# Patient Record
Sex: FEMALE | Race: WHITE | NOT HISPANIC OR LATINO | Employment: OTHER | URBAN - METROPOLITAN AREA
[De-identification: names, ages, dates, MRNs, and addresses within clinical notes are randomized per-mention and may not be internally consistent; named-entity substitution may affect disease eponyms.]

---

## 2023-03-22 DIAGNOSIS — M19.90 ARTHRITIS: Primary | ICD-10-CM

## 2023-03-24 RX ORDER — MELOXICAM 15 MG/1
TABLET ORAL
Qty: 90 TABLET | Refills: 3 | Status: SHIPPED | OUTPATIENT
Start: 2023-03-24 | End: 2024-01-22

## 2023-04-11 ENCOUNTER — APPOINTMENT (OUTPATIENT)
Dept: PRIMARY CARE | Facility: CLINIC | Age: 83
End: 2023-04-11
Payer: MEDICARE

## 2023-04-17 ENCOUNTER — LAB (OUTPATIENT)
Dept: LAB | Facility: LAB | Age: 83
End: 2023-04-17
Payer: MEDICARE

## 2023-04-17 ENCOUNTER — OFFICE VISIT (OUTPATIENT)
Dept: PRIMARY CARE | Facility: CLINIC | Age: 83
End: 2023-04-17
Payer: MEDICARE

## 2023-04-17 VITALS — DIASTOLIC BLOOD PRESSURE: 82 MMHG | SYSTOLIC BLOOD PRESSURE: 135 MMHG

## 2023-04-17 DIAGNOSIS — R05.9 COUGH, UNSPECIFIED TYPE: ICD-10-CM

## 2023-04-17 DIAGNOSIS — R05.3 CHRONIC COUGH: Primary | ICD-10-CM

## 2023-04-17 DIAGNOSIS — F10.27 DEMENTIA ASSOCIATED WITH ALCOHOLISM, UNSPECIFIED DEMENTIA SEVERITY, UNSPECIFIED WHETHER BEHAVIORAL, PSYCHOTIC, OR MOOD DISTURBANCE OR ANXIETY (MULTI): ICD-10-CM

## 2023-04-17 DIAGNOSIS — N32.81 OVERACTIVE BLADDER: ICD-10-CM

## 2023-04-17 LAB
ALANINE AMINOTRANSFERASE (SGPT) (U/L) IN SER/PLAS: 36 U/L (ref 7–45)
ALBUMIN (G/DL) IN SER/PLAS: 4.5 G/DL (ref 3.4–5)
ALKALINE PHOSPHATASE (U/L) IN SER/PLAS: 115 U/L (ref 33–136)
ANION GAP IN SER/PLAS: 13 MMOL/L (ref 10–20)
ASPARTATE AMINOTRANSFERASE (SGOT) (U/L) IN SER/PLAS: 26 U/L (ref 9–39)
BILIRUBIN TOTAL (MG/DL) IN SER/PLAS: 0.4 MG/DL (ref 0–1.2)
CALCIUM (MG/DL) IN SER/PLAS: 10.6 MG/DL (ref 8.6–10.6)
CARBON DIOXIDE, TOTAL (MMOL/L) IN SER/PLAS: 27 MMOL/L (ref 21–32)
CHLORIDE (MMOL/L) IN SER/PLAS: 110 MMOL/L (ref 98–107)
COBALAMIN (VITAMIN B12) (PG/ML) IN SER/PLAS: 523 PG/ML (ref 211–911)
CREATININE (MG/DL) IN SER/PLAS: 1.03 MG/DL (ref 0.5–1.05)
FOLATE (NG/ML) IN SER/PLAS: >24 NG/ML
GFR FEMALE: 54 ML/MIN/1.73M2
GLUCOSE (MG/DL) IN SER/PLAS: 99 MG/DL (ref 74–99)
POC CALCIUM IONIZED (MMOL/L) IN BLOOD: 1.37 MMOL/L (ref 1.1–1.33)
POTASSIUM (MMOL/L) IN SER/PLAS: 4.4 MMOL/L (ref 3.5–5.3)
PROTEIN TOTAL: 7 G/DL (ref 6.4–8.2)
SODIUM (MMOL/L) IN SER/PLAS: 146 MMOL/L (ref 136–145)
UREA NITROGEN (MG/DL) IN SER/PLAS: 31 MG/DL (ref 6–23)

## 2023-04-17 PROCEDURE — 99214 OFFICE O/P EST MOD 30 MIN: CPT | Performed by: INTERNAL MEDICINE

## 2023-04-17 PROCEDURE — 82397 CHEMILUMINESCENT ASSAY: CPT

## 2023-04-17 PROCEDURE — 82330 ASSAY OF CALCIUM: CPT

## 2023-04-17 PROCEDURE — 82607 VITAMIN B-12: CPT

## 2023-04-17 PROCEDURE — 83970 ASSAY OF PARATHORMONE: CPT

## 2023-04-17 PROCEDURE — 80053 COMPREHEN METABOLIC PANEL: CPT

## 2023-04-17 PROCEDURE — 82746 ASSAY OF FOLIC ACID SERUM: CPT

## 2023-04-17 PROCEDURE — 36415 COLL VENOUS BLD VENIPUNCTURE: CPT

## 2023-04-17 RX ORDER — MEMANTINE HYDROCHLORIDE 10 MG/1
10 TABLET ORAL 2 TIMES DAILY
COMMUNITY

## 2023-04-17 RX ORDER — MELOXICAM 15 MG/1
15 TABLET ORAL DAILY
COMMUNITY
Start: 2015-10-28 | End: 2023-04-17

## 2023-04-17 RX ORDER — MIRABEGRON 50 MG/1
50 TABLET, EXTENDED RELEASE ORAL DAILY
Qty: 90 TABLET | Refills: 3 | Status: SHIPPED | OUTPATIENT
Start: 2023-04-17 | End: 2024-04-09 | Stop reason: SDUPTHER

## 2023-04-17 RX ORDER — DONEPEZIL HYDROCHLORIDE 10 MG/1
10 TABLET, FILM COATED ORAL NIGHTLY
COMMUNITY
Start: 2019-06-27

## 2023-04-17 RX ORDER — MIRABEGRON 50 MG/1
25 TABLET, FILM COATED, EXTENDED RELEASE ORAL DAILY
COMMUNITY
End: 2023-04-17 | Stop reason: SDUPTHER

## 2023-04-17 RX ORDER — ACETAMINOPHEN AND CODEINE PHOSPHATE 300; 30 MG/1; MG/1
1 TABLET ORAL NIGHTLY
Qty: 90 TABLET | Refills: 0 | Status: SHIPPED | OUTPATIENT
Start: 2023-04-17 | End: 2023-09-25 | Stop reason: SDUPTHER

## 2023-04-17 RX ORDER — NIFEDIPINE 30 MG/1
30 TABLET, FILM COATED, EXTENDED RELEASE ORAL
COMMUNITY
Start: 2019-11-18 | End: 2023-04-17

## 2023-04-17 RX ORDER — ACETAMINOPHEN AND CODEINE PHOSPHATE 300; 30 MG/1; MG/1
1 TABLET ORAL NIGHTLY
COMMUNITY
Start: 2023-03-29 | End: 2023-04-17 | Stop reason: SDUPTHER

## 2023-04-17 RX ORDER — BENZONATATE 100 MG/1
100 CAPSULE ORAL 3 TIMES DAILY PRN
COMMUNITY

## 2023-04-17 NOTE — PATIENT INSTRUCTIONS
Marli,     We are repeating labs today.   You're due for a Tylenol #3 refill on 4-29-23 which I will send to the pharmacy.   See me back in 3 months!     CL

## 2023-04-17 NOTE — PROGRESS NOTES
Marli Pierce is an 82 yo F Presenting in FU.     *HYperCa   [ ]repeat labs incl PTH     1. MNG   -s/p bx 2015   -s/p endo surg referral in interval, planned repeat US in yrs, no need further bx     2. Chronic cough   -tried brittanie, failed   -tried stopping losart - did not affect cough and became hypertensive   -chronic codeine in the past with me   -s/p extensive workup dx'ed LPR and VCD in the past   -apprised of risks,benefits   -UDS 11.22, positive for morphine and codeine, metabolite of codeine  -Follow up in 3 months, try Tylenol 3 see if there are any benefits vs discontinue  [ ]update CSA     3. BCA s/p surgery and reconstruction     4. Thoracic spine DJD per MRI 2015     5. CKD   [ ]repeat labs     6. HTN, chronic orthostasis   -nifedipine 30 BID   -losartan 50 (pt stopped taking 2-3 months ago)  -Take pills with her next visit to go over med list    7. DLD , minimal carotid plaque only on 9/2019 carotid US   -atorva 20     8. MCI   -follows with Dr. Neema Suresh a private physician   -donepazil and memantine chronically through them   -MRI Brain 8/2020: no mass, infarct or hemorrhage, small vessel ischemic change   -Pt reports worsening cognitive decline, follows with neurology    9. KEYONA   -Myrbetriq             I have personally reviewed the OARRS report for MARLI PIERCE. I have considered the risks of abuse, dependence, addiction and diversion.   Is the patient prescribed a combination of a benzodiazepine and opioid? No.   Last urine drug screening date/ordered today: 10-10-22   Results of last screen: Results as expected.   Controlled Substance Agreement:   I have printed this form and reviewed each line item with the patient and the patient has verbalized understanding.   Date of the last Controlled Substance Agreement: 4.17.23  OPIOID   What is the patient’s goal of therapy? chronic cough control.  Is this being achieved with current treatment? yes.   Attestation statement: I feel that  it is clinically indicated to continue this current medication regimen after consideration of alternative therapies, and other non-opioid treatments.   Opioid Risk Screening:   Opioid Risk Tool   Last opioid risk screening date/ordered today: 4.17.23  Patient's total score is 0, within range of Low Risk (<= 3).   Pain Scale Screening:   Pain Assessment and Documentation Tool (PADT)   Date of Assessment: 7-26-21  Analgesia:   Patient reports her pain level on average during the past week is 4 on a 0 - 10 scale.   Patient reports that her pain level at its worst during the past week was 2 on a 0 -10 scale.   90 % of pain has been relieved during the past week per patient   Patient states that the amount of pain relief she is now obtaining from her current pain reliever(s) is enough to make a real difference in her life.  Activities of Daily Living:   Physical functioning: Better   Family relationships: Better   Social relationships: Better   Mood: Better   Sleep patterns: Better   Overall functioning: Better   Adverse Events:   No, SERGIO PIERCE is not experiencing side effects from current pain reliever.  a. Nausea: None  b. Vomiting: None  c. Constipation: None  d. Itching: None  e. Mental cloudiness: None  f. Sweating: None  g. Fatigue: None  h. Drowsiness: None.   Comments:. cough used as surrogate for pain in this case of off label use of low dose codeine.              Gen Aox3 NAD well appearing   HEENT mmm pharynx clear no cervical LAD   Eyes sclerae clear   CV rrr nl s1/2 no m/r/g  Pulm CTAB no adventitious sounds   Ext warm dry no edema 2+ DP pulse

## 2023-04-18 LAB — PARATHYRIN INTACT (PG/ML) IN SER/PLAS: 150.8 PG/ML (ref 18.5–88)

## 2023-04-21 LAB — PTH-RELATED PEPTIDE, PLASMA: 0.6 PMOL/L

## 2023-06-14 DIAGNOSIS — I10 PRIMARY HYPERTENSION: ICD-10-CM

## 2023-06-15 DIAGNOSIS — I10 PRIMARY HYPERTENSION: ICD-10-CM

## 2023-06-15 RX ORDER — NIFEDIPINE 30 MG/1
TABLET, FILM COATED, EXTENDED RELEASE ORAL
Qty: 180 TABLET | Refills: 3 | Status: SHIPPED | OUTPATIENT
Start: 2023-06-15 | End: 2023-06-16

## 2023-06-16 RX ORDER — NIFEDIPINE 30 MG/1
TABLET, FILM COATED, EXTENDED RELEASE ORAL
Qty: 180 TABLET | Refills: 3 | Status: SHIPPED | OUTPATIENT
Start: 2023-06-16 | End: 2024-02-23

## 2023-07-17 ENCOUNTER — OFFICE VISIT (OUTPATIENT)
Dept: PRIMARY CARE | Facility: CLINIC | Age: 83
End: 2023-07-17
Payer: MEDICARE

## 2023-07-17 ENCOUNTER — APPOINTMENT (OUTPATIENT)
Dept: LAB | Facility: LAB | Age: 83
End: 2023-07-17
Payer: MEDICARE

## 2023-07-17 ENCOUNTER — LAB (OUTPATIENT)
Dept: LAB | Facility: LAB | Age: 83
End: 2023-07-17
Payer: MEDICARE

## 2023-07-17 VITALS
BODY MASS INDEX: 30.18 KG/M2 | HEART RATE: 90 BPM | SYSTOLIC BLOOD PRESSURE: 138 MMHG | WEIGHT: 165 LBS | DIASTOLIC BLOOD PRESSURE: 85 MMHG

## 2023-07-17 DIAGNOSIS — E21.3 HYPERPARATHYROIDISM (MULTI): ICD-10-CM

## 2023-07-17 DIAGNOSIS — Z12.31 BREAST CANCER SCREENING BY MAMMOGRAM: ICD-10-CM

## 2023-07-17 DIAGNOSIS — Z78.0 ASYMPTOMATIC POSTMENOPAUSAL ESTROGEN DEFICIENCY: ICD-10-CM

## 2023-07-17 DIAGNOSIS — E83.52 HYPERCALCEMIA: Primary | ICD-10-CM

## 2023-07-17 DIAGNOSIS — E83.52 HYPERCALCEMIA: ICD-10-CM

## 2023-07-17 LAB
ALANINE AMINOTRANSFERASE (SGPT) (U/L) IN SER/PLAS: 40 U/L (ref 7–45)
ALBUMIN (G/DL) IN SER/PLAS: 5 G/DL (ref 3.4–5)
ALKALINE PHOSPHATASE (U/L) IN SER/PLAS: 142 U/L (ref 33–136)
ANION GAP IN SER/PLAS: 19 MMOL/L (ref 10–20)
ASPARTATE AMINOTRANSFERASE (SGOT) (U/L) IN SER/PLAS: 28 U/L (ref 9–39)
BASOPHILS (10*3/UL) IN BLOOD BY AUTOMATED COUNT: 0.03 X10E9/L (ref 0–0.1)
BASOPHILS/100 LEUKOCYTES IN BLOOD BY AUTOMATED COUNT: 0.4 % (ref 0–2)
BILIRUBIN TOTAL (MG/DL) IN SER/PLAS: 0.5 MG/DL (ref 0–1.2)
CALCIUM (MG/DL) IN SER/PLAS: 11.4 MG/DL (ref 8.6–10.6)
CARBON DIOXIDE, TOTAL (MMOL/L) IN SER/PLAS: 24 MMOL/L (ref 21–32)
CHLORIDE (MMOL/L) IN SER/PLAS: 107 MMOL/L (ref 98–107)
CREATININE (MG/DL) IN SER/PLAS: 1.01 MG/DL (ref 0.5–1.05)
EOSINOPHILS (10*3/UL) IN BLOOD BY AUTOMATED COUNT: 0.05 X10E9/L (ref 0–0.4)
EOSINOPHILS/100 LEUKOCYTES IN BLOOD BY AUTOMATED COUNT: 0.7 % (ref 0–6)
ERYTHROCYTE DISTRIBUTION WIDTH (RATIO) BY AUTOMATED COUNT: 14.7 % (ref 11.5–14.5)
ERYTHROCYTE MEAN CORPUSCULAR HEMOGLOBIN CONCENTRATION (G/DL) BY AUTOMATED: 31.6 G/DL (ref 32–36)
ERYTHROCYTE MEAN CORPUSCULAR VOLUME (FL) BY AUTOMATED COUNT: 98 FL (ref 80–100)
ERYTHROCYTES (10*6/UL) IN BLOOD BY AUTOMATED COUNT: 5.21 X10E12/L (ref 4–5.2)
GFR FEMALE: 55 ML/MIN/1.73M2
GLUCOSE (MG/DL) IN SER/PLAS: 98 MG/DL (ref 74–99)
HEMATOCRIT (%) IN BLOOD BY AUTOMATED COUNT: 51.2 % (ref 36–46)
HEMOGLOBIN (G/DL) IN BLOOD: 16.2 G/DL (ref 12–16)
IMMATURE GRANULOCYTES/100 LEUKOCYTES IN BLOOD BY AUTOMATED COUNT: 0.3 % (ref 0–0.9)
LEUKOCYTES (10*3/UL) IN BLOOD BY AUTOMATED COUNT: 7.2 X10E9/L (ref 4.4–11.3)
LYMPHOCYTES (10*3/UL) IN BLOOD BY AUTOMATED COUNT: 1.44 X10E9/L (ref 0.8–3)
LYMPHOCYTES/100 LEUKOCYTES IN BLOOD BY AUTOMATED COUNT: 19.9 % (ref 13–44)
MONOCYTES (10*3/UL) IN BLOOD BY AUTOMATED COUNT: 0.5 X10E9/L (ref 0.05–0.8)
MONOCYTES/100 LEUKOCYTES IN BLOOD BY AUTOMATED COUNT: 6.9 % (ref 2–10)
NEUTROPHILS (10*3/UL) IN BLOOD BY AUTOMATED COUNT: 5.19 X10E9/L (ref 1.6–5.5)
NEUTROPHILS/100 LEUKOCYTES IN BLOOD BY AUTOMATED COUNT: 71.8 % (ref 40–80)
NRBC (PER 100 WBCS) BY AUTOMATED COUNT: 0 /100 WBC (ref 0–0)
PARATHYRIN INTACT (PG/ML) IN SER/PLAS: 149.6 PG/ML (ref 18.5–88)
PLATELETS (10*3/UL) IN BLOOD AUTOMATED COUNT: 290 X10E9/L (ref 150–450)
POTASSIUM (MMOL/L) IN SER/PLAS: 4.8 MMOL/L (ref 3.5–5.3)
PROTEIN TOTAL: 8.3 G/DL (ref 6.4–8.2)
SODIUM (MMOL/L) IN SER/PLAS: 145 MMOL/L (ref 136–145)
THYROTROPIN (MIU/L) IN SER/PLAS BY DETECTION LIMIT <= 0.05 MIU/L: 2.74 MIU/L (ref 0.44–3.98)
UREA NITROGEN (MG/DL) IN SER/PLAS: 33 MG/DL (ref 6–23)

## 2023-07-17 PROCEDURE — 80053 COMPREHEN METABOLIC PANEL: CPT

## 2023-07-17 PROCEDURE — 99214 OFFICE O/P EST MOD 30 MIN: CPT | Performed by: INTERNAL MEDICINE

## 2023-07-17 PROCEDURE — 85025 COMPLETE CBC W/AUTO DIFF WBC: CPT

## 2023-07-17 PROCEDURE — 84443 ASSAY THYROID STIM HORMONE: CPT

## 2023-07-17 PROCEDURE — 1125F AMNT PAIN NOTED PAIN PRSNT: CPT | Performed by: INTERNAL MEDICINE

## 2023-07-17 PROCEDURE — 36415 COLL VENOUS BLD VENIPUNCTURE: CPT

## 2023-07-17 PROCEDURE — 83970 ASSAY OF PARATHORMONE: CPT

## 2023-07-17 RX ORDER — CALCIUM CARB, CITRATE, MALATE 250 MG
2 CAPSULE ORAL 2 TIMES DAILY
COMMUNITY

## 2023-07-17 RX ORDER — IBUPROFEN 100 MG/5ML
1000 SUSPENSION, ORAL (FINAL DOSE FORM) ORAL DAILY
COMMUNITY

## 2023-07-17 NOTE — PROGRESS NOTES
Subjective   Patient ID: Marli Canales is a 83 y.o. female who presents for FUV.     HPI  Ms. Canales is an 82 yo F with PMH of cognitive impairment/dementia, HTN, DLD, MNG, breast cancer s/p chemoRT and reconstruction surgery who presents for FUV.     Last seen in April when patient noted to have hypercalcemia noted on lab work. Repeat labs included PTH which was high. No renal dx. Patient accompanied by her , who is her primary caregiver, who provided majority of history.     Patient reportedly had worsening in memory and mental status over the past year. Independent in toileting, dressing herself, but becoming more dependent in other ADLs. Endorsed worsening lethargy, unable to do as much as before. Mild weight gain 5 lbs in last few months due to lack of activity. Patient also endorsed mild arthritis/bone pains.     Otherwise denied abd pain or discomfort, no kidney stones, no hematuria. No other specific complaints.     Review of Systems  12-point ROS completed and negative other than noted in HPI above.     Objective   Physical Exam  Gen: well appearing, sitting in chair in NAD  HEENT: pupils equal and round, MMM, EOMI intact  Resp: CTAB no crackles/wheeze, comfortable on RA  CV: RRR, no murmurs, 2+ bl radial pulses, normal S1 and S2  GI: s/nt/nd  Ext: wwp, normal ROM without pain  Neuro: alert, interacts/responds appropriately, follows commands  Psych: appropriate mood and pleasant affect    Assessment/Plan     Ms. Canales is an 82 yo F with PMH of cognitive impairment/dementia, HTN, DLD, MNG, breast cancer s/p chemoRT and reconstruction surgery who presents for FUV.     #Hypercalcemia  #Elevated PTH  :: Labs c/w primary hyperpara vs [facility]. Patient's CKD is only mild  :: Possibly symptomatic given worsening memory issues, bone pain, lethargy  - Endocrine surg referral  - Urine Ca measurement  - Vit D testing wnl in past    #MNG   -s/p bx 2015   -s/p endo surg referral in interval, planned  repeat US in yrs, no need further bx     # Chronic cough   -tried brittanie, failed   -tried stopping losart - did not affect cough and became hypertensive   -s/p extensive workup dx'ed LPR and VCD in the past   - chronic codeine use has been the only thing that has helped  -apprised of risks,benefits   -UDS 11.22, positive for morphine and codeine, metabolite of codeine, updated CSA     #BCA s/p surgery and reconstruction      #Thoracic spine DJD per MRI 2015      #CKD   - continue to trend for now     #HTN  # Hx chronic orthostasis   - Goal SBP 130s given hx of hypotension and orthostasis  -nifedipine 30 BID, at goal     # DLD , minimal carotid plaque only on 9/2019 carotid US   -atorva 20      #Cognitive Impairment  #Lethargy  -follows with Dr. Neema Suresh a private physician   -donepazil and memantine chronically through them   -MRI Brain 8/2020: no mass, infarct or hemorrhage, small vessel ischemic change   -Pt reports worsening cognitive decline, will rule out reversible etiology from hyperCa+  - Repeat labs including TSH    # OAB   -Continue myrbetriq

## 2023-07-17 NOTE — PATIENT INSTRUCTIONS
Marli,     We are repeating full labs today.   I am also ordering a 24 hour urine test. You go to the bathroom in the morning first thing and DONT collect that (since that is overnight urine), after that you collect all the urine through the day and the first morning urine the next morning. Then you return it to lab.   I am placing referrals both back to Dr. Kenn Gutierrez and to an endocrinologist to explore the potential that some of your memory changes may be related to a problem called hyperparathyroidism.   Call [phone] to schedule a mammogram and a bone density.     CL

## 2023-08-28 ENCOUNTER — TELEPHONE (OUTPATIENT)
Dept: PRIMARY CARE | Facility: CLINIC | Age: 83
End: 2023-08-28
Payer: MEDICARE

## 2023-08-28 NOTE — TELEPHONE ENCOUNTER
Patient went to a football game and became lethargic  she got sent to ER after, her  want to know do you want to see her , she has appointment in October, but do yo want to see her before, he would like yo to review notes from ER

## 2023-08-29 ENCOUNTER — APPOINTMENT (OUTPATIENT)
Dept: PRIMARY CARE | Facility: CLINIC | Age: 83
End: 2023-08-29
Payer: MEDICARE

## 2023-08-30 ENCOUNTER — OFFICE VISIT (OUTPATIENT)
Dept: PRIMARY CARE | Facility: CLINIC | Age: 83
End: 2023-08-30
Payer: MEDICARE

## 2023-08-30 DIAGNOSIS — N17.9 ACUTE KIDNEY INJURY (CMS-HCC): Primary | ICD-10-CM

## 2023-08-30 PROCEDURE — 99214 OFFICE O/P EST MOD 30 MIN: CPT | Performed by: INTERNAL MEDICINE

## 2023-08-30 PROCEDURE — 1125F AMNT PAIN NOTED PAIN PRSNT: CPT | Performed by: INTERNAL MEDICINE

## 2023-08-30 NOTE — PROGRESS NOTES
Sergio Pierce is an 84 yo F presenting in ER FU.   Presented 8.18.23 after AMS at a hot football game.   Near syncopal event, taken to ER.   ER labs +dimer and minor trop leak +mild JAZMIN   CT PE neg.   Mild JAZMIN on labs.   Released to home.       Chronic PL     *HYperCa, likely hyperpara   -s/p endo referral 7.17.23 not yet had visit or scheduled   [ ]will replace endo referral      1. MNG   -s/p bx 2015   -s/p endo surg referral in interval, planned repeat US in yrs, no need further bx      2. Chronic cough   -tried brittanie, failed   -tried stopping losart - did not affect cough and became hypertensive   -chronic codeine in the past with me   -s/p extensive workup dx'ed LPR and VCD in the past   -apprised of risks,benefits   -UDS 11.22, positive for morphine and codeine, metabolite of codeine  -Follow up in 3 months, try Tylenol 3 see if there are any benefits vs discontinue  [ ]update CSA      3. BCA s/p surgery and reconstruction      4. Thoracic spine DJD per MRI 2015      5. CKD   [ ]repeat labs      6. HTN, chronic orthostasis   -nifedipine 30 BID   -losartan 50 (pt stopped taking 2-3 months ago)  -Take pills with her next visit to go over med list     7. DLD , minimal carotid plaque only on 9/2019 carotid US   -atorva 20      8. MCI   -follows with Dr. Neema Suresh a private physician   -donepazil and memantine chronically through them   -MRI Brain 8/2020: no mass, infarct or hemorrhage, small vessel ischemic change   -Pt reports worsening cognitive decline, follows with neurology     9. OAB   -Myrbetriq                  I have personally reviewed the OARRS report for SERGIO PIERCE. I have considered the risks of abuse, dependence, addiction and diversion.   Is the patient prescribed a combination of a benzodiazepine and opioid? No.   Last urine drug screening date/ordered today: 10-10-22   Results of last screen: Results as expected.   Controlled Substance Agreement:   I have printed this form  and reviewed each line item with the patient and the patient has verbalized understanding.   Date of the last Controlled Substance Agreement: 4.17.23  OPIOID   What is the patient’s goal of therapy? chronic cough control.  Is this being achieved with current treatment? yes.   Attestation statement: I feel that it is clinically indicated to continue this current medication regimen after consideration of alternative therapies, and other non-opioid treatments.   Opioid Risk Screening:   Opioid Risk Tool   Last opioid risk screening date/ordered today: 4.17.23  Patient's total score is 0, within range of Low Risk (<= 3).   Pain Scale Screening:   Pain Assessment and Documentation Tool (PADT)   Date of Assessment: 7-26-21  Analgesia:   Patient reports her pain level on average during the past week is 4 on a 0 - 10 scale.   Patient reports that her pain level at its worst during the past week was 2 on a 0 -10 scale.   90 % of pain has been relieved during the past week per patient   Patient states that the amount of pain relief she is now obtaining from her current pain reliever(s) is enough to make a real difference in her life.  Activities of Daily Living:   Physical functioning: Better   Family relationships: Better   Social relationships: Better   Mood: Better   Sleep patterns: Better   Overall functioning: Better   Adverse Events:   No, SERGIO PIERCE is not experiencing side effects from current pain reliever.  a. Nausea: None  b. Vomiting: None  c. Constipation: None  d. Itching: None  e. Mental cloudiness: None  f. Sweating: None  g. Fatigue: None  h. Drowsiness: None.   Comments:. cough used as surrogate for pain in this case of off label use of low dose codeine.            O   VSS   Gen alert well appearing NAD   HENT mmm   Ext warm dry no edema         A/P   Near syncopal event s/p OSH ER visit with unremarkable workup now back to baseline.   Recent hyperCa with elevated PTH in setting of background  dementia, enocuraged endocrinology visit.

## 2023-09-05 ENCOUNTER — LAB (OUTPATIENT)
Dept: LAB | Facility: LAB | Age: 83
End: 2023-09-05
Payer: MEDICARE

## 2023-09-05 DIAGNOSIS — E83.52 HYPERCALCEMIA: ICD-10-CM

## 2023-09-05 DIAGNOSIS — E21.3 HYPERPARATHYROIDISM (MULTI): ICD-10-CM

## 2023-09-05 LAB
CALCIUM (MG/DL) IN URINE: 11.6 MG/DL
CALCIUM (MG/L) IN 24 HOUR URINE: 104 MG/24H (ref 100–300)
COLLECTION DURATION OF URINE: 24 HR
CREATININE (MG/24HR) IN 24 HOUR URINE: 0.7 G/24H (ref 0.67–1.59)
CREATININE (MG/DL) IN URINE: 77.7 MG/DL (ref 20–320)
VOLUME OF URINE: 900 ML

## 2023-09-05 PROCEDURE — 82340 ASSAY OF CALCIUM IN URINE: CPT

## 2023-09-05 PROCEDURE — 82570 ASSAY OF URINE CREATININE: CPT

## 2023-09-05 PROCEDURE — 81050 URINALYSIS VOLUME MEASURE: CPT

## 2023-09-08 ENCOUNTER — TELEPHONE (OUTPATIENT)
Dept: PRIMARY CARE | Facility: CLINIC | Age: 83
End: 2023-09-08
Payer: MEDICARE

## 2023-09-08 NOTE — TELEPHONE ENCOUNTER
Nito said  on patients visit summary it say they discussed a acute kidney injury , he said they never discussed this, so he dont want the wrong info to be in her history, also  the 24hour urine did not test parathyroid he say that's the reason why a 24hour was done

## 2023-09-25 DIAGNOSIS — R05.9 COUGH, UNSPECIFIED TYPE: ICD-10-CM

## 2023-09-27 RX ORDER — ACETAMINOPHEN AND CODEINE PHOSPHATE 300; 30 MG/1; MG/1
1 TABLET ORAL NIGHTLY
Qty: 90 TABLET | Refills: 0 | Status: SHIPPED | OUTPATIENT
Start: 2023-09-27 | End: 2023-11-10 | Stop reason: SDUPTHER

## 2023-10-16 ENCOUNTER — APPOINTMENT (OUTPATIENT)
Dept: PRIMARY CARE | Facility: CLINIC | Age: 83
End: 2023-10-16
Payer: MEDICARE

## 2023-11-03 ENCOUNTER — OFFICE VISIT (OUTPATIENT)
Dept: ENDOCRINOLOGY | Facility: CLINIC | Age: 83
End: 2023-11-03
Payer: MEDICARE

## 2023-11-03 VITALS
SYSTOLIC BLOOD PRESSURE: 132 MMHG | WEIGHT: 163 LBS | DIASTOLIC BLOOD PRESSURE: 74 MMHG | HEIGHT: 62 IN | BODY MASS INDEX: 30 KG/M2

## 2023-11-03 DIAGNOSIS — E21.3 HYPERPARATHYROIDISM (MULTI): ICD-10-CM

## 2023-11-03 DIAGNOSIS — E04.1 THYROID NODULE: Primary | ICD-10-CM

## 2023-11-03 DIAGNOSIS — E83.52 HYPERCALCEMIA: ICD-10-CM

## 2023-11-03 DIAGNOSIS — I10 ESSENTIAL HYPERTENSION: ICD-10-CM

## 2023-11-03 PROBLEM — R60.9 EDEMA: Status: ACTIVE | Noted: 2023-11-03

## 2023-11-03 PROBLEM — R49.0 HOARSENESS: Status: ACTIVE | Noted: 2021-09-29

## 2023-11-03 PROBLEM — I15.9 SECONDARY HYPERTENSION: Status: ACTIVE | Noted: 2021-09-01

## 2023-11-03 PROBLEM — E07.9 THYROID MASS: Status: ACTIVE | Noted: 2023-11-03

## 2023-11-03 PROBLEM — R92.8 ABNORMAL MAMMOGRAM: Status: ACTIVE | Noted: 2023-11-03

## 2023-11-03 PROBLEM — R79.89 ELEVATED SERUM CREATININE: Status: ACTIVE | Noted: 2021-09-29

## 2023-11-03 PROBLEM — M70.61 TROCHANTERIC BURSITIS OF BOTH HIPS: Status: ACTIVE | Noted: 2023-11-03

## 2023-11-03 PROBLEM — R79.89 OTHER SPECIFIED ABNORMAL FINDINGS OF BLOOD CHEMISTRY: Status: ACTIVE | Noted: 2023-11-03

## 2023-11-03 PROBLEM — C50.919 MALIGNANT NEOPLASM OF BREAST (MULTI): Status: ACTIVE | Noted: 2023-11-03

## 2023-11-03 PROBLEM — R41.3 MEMORY LOSS: Status: ACTIVE | Noted: 2023-11-03

## 2023-11-03 PROBLEM — J30.2 SEASONAL ALLERGIES: Status: ACTIVE | Noted: 2023-11-03

## 2023-11-03 PROBLEM — I97.2 POSTMASTECTOMY LYMPHEDEMA SYNDROME OF RIGHT UPPER EXTREMITY: Status: ACTIVE | Noted: 2023-11-03

## 2023-11-03 PROBLEM — R42 DIZZINESS AND GIDDINESS: Status: ACTIVE | Noted: 2023-11-03

## 2023-11-03 PROBLEM — R79.89 POSITIVE D-DIMER: Status: ACTIVE | Noted: 2023-11-03

## 2023-11-03 PROBLEM — R26.9 ABNORMAL GAIT: Status: ACTIVE | Noted: 2023-11-03

## 2023-11-03 PROBLEM — R94.31 ABNORMAL ECG: Status: ACTIVE | Noted: 2021-09-29

## 2023-11-03 PROBLEM — R06.09 CHRONIC DYSPNEA: Status: ACTIVE | Noted: 2023-11-03

## 2023-11-03 PROBLEM — N60.11 BILATERAL FIBROCYSTIC BREAST CHANGES: Status: ACTIVE | Noted: 2021-12-23

## 2023-11-03 PROBLEM — F32.A MILD DEPRESSION: Status: ACTIVE | Noted: 2023-11-03

## 2023-11-03 PROBLEM — M19.042 ARTHRITIS OF HAND, LEFT, DEGENERATIVE: Status: ACTIVE | Noted: 2023-11-03

## 2023-11-03 PROBLEM — J30.9 ALLERGIC RHINITIS: Status: ACTIVE | Noted: 2023-11-03

## 2023-11-03 PROBLEM — K21.9 GERD WITHOUT ESOPHAGITIS: Status: ACTIVE | Noted: 2023-11-03

## 2023-11-03 PROBLEM — L70.9 ADULT ACNE: Status: ACTIVE | Noted: 2023-11-03

## 2023-11-03 PROBLEM — E78.5 HYPERLIPIDEMIA: Status: ACTIVE | Noted: 2023-11-03

## 2023-11-03 PROBLEM — M19.041 ARTHRITIS OF HAND, RIGHT, DEGENERATIVE: Status: ACTIVE | Noted: 2023-11-03

## 2023-11-03 PROBLEM — M19.049 DEGENERATIVE ARTHRITIS OF FINGER: Status: ACTIVE | Noted: 2023-11-03

## 2023-11-03 PROBLEM — N60.12 BILATERAL FIBROCYSTIC BREAST CHANGES: Status: ACTIVE | Noted: 2021-12-23

## 2023-11-03 PROBLEM — R41.82 ALTERED MENTAL STATUS: Status: ACTIVE | Noted: 2023-11-03

## 2023-11-03 PROBLEM — R41.89 PSEUDODEMENTIA: Status: ACTIVE | Noted: 2023-11-03

## 2023-11-03 PROBLEM — F09 MILD COGNITIVE DISORDER: Status: ACTIVE | Noted: 2021-09-29

## 2023-11-03 PROBLEM — R05.3 CHRONIC COUGH: Status: ACTIVE | Noted: 2023-11-03

## 2023-11-03 PROBLEM — R55 SYNCOPE: Status: ACTIVE | Noted: 2023-11-03

## 2023-11-03 PROBLEM — I95.1 ORTHOSTATIC HYPOTENSION: Status: ACTIVE | Noted: 2023-11-03

## 2023-11-03 PROBLEM — K21.9 LARYNGOPHARYNGEAL REFLUX: Status: ACTIVE | Noted: 2021-09-29

## 2023-11-03 PROBLEM — G45.9 TRANSIENT ISCHEMIC ATTACK: Status: ACTIVE | Noted: 2021-09-29

## 2023-11-03 PROBLEM — M19.90 ARTHRITIS: Status: ACTIVE | Noted: 2023-11-03

## 2023-11-03 PROBLEM — F32.1 MODERATE MAJOR DEPRESSION (MULTI): Status: ACTIVE | Noted: 2023-11-03

## 2023-11-03 PROBLEM — R55 SYNCOPE AND COLLAPSE: Status: ACTIVE | Noted: 2021-09-29

## 2023-11-03 PROBLEM — R06.02 SHORTNESS OF BREATH: Status: ACTIVE | Noted: 2023-11-03

## 2023-11-03 PROBLEM — R55 NEAR SYNCOPE: Status: ACTIVE | Noted: 2023-11-03

## 2023-11-03 PROBLEM — F41.9 ANXIETY: Status: ACTIVE | Noted: 2023-11-03

## 2023-11-03 PROBLEM — G56.00 CARPAL TUNNEL SYNDROME: Status: ACTIVE | Noted: 2023-11-03

## 2023-11-03 PROBLEM — F01.50 VASCULAR DEMENTIA WITHOUT BEHAVIORAL DISTURBANCE (MULTI): Status: ACTIVE | Noted: 2023-11-03

## 2023-11-03 PROBLEM — M70.62 TROCHANTERIC BURSITIS OF BOTH HIPS: Status: ACTIVE | Noted: 2023-11-03

## 2023-11-03 PROBLEM — M54.12 CERVICAL RADICULOPATHY: Status: ACTIVE | Noted: 2023-11-03

## 2023-11-03 PROBLEM — M54.81 OCCIPITAL NEURALGIA OF RIGHT SIDE: Status: ACTIVE | Noted: 2023-11-03

## 2023-11-03 PROBLEM — I95.9 HYPOTENSION: Status: ACTIVE | Noted: 2021-09-29

## 2023-11-03 PROBLEM — E04.2 NONTOXIC MULTINODULAR GOITER: Status: ACTIVE | Noted: 2023-11-03

## 2023-11-03 PROBLEM — E55.9 VITAMIN D DEFICIENCY: Status: ACTIVE | Noted: 2023-11-03

## 2023-11-03 PROCEDURE — 99205 OFFICE O/P NEW HI 60 MIN: CPT | Performed by: INTERNAL MEDICINE

## 2023-11-03 PROCEDURE — 1125F AMNT PAIN NOTED PAIN PRSNT: CPT | Performed by: INTERNAL MEDICINE

## 2023-11-03 PROCEDURE — 1159F MED LIST DOCD IN RCRD: CPT | Performed by: INTERNAL MEDICINE

## 2023-11-03 PROCEDURE — 3075F SYST BP GE 130 - 139MM HG: CPT | Performed by: INTERNAL MEDICINE

## 2023-11-03 PROCEDURE — 3078F DIAST BP <80 MM HG: CPT | Performed by: INTERNAL MEDICINE

## 2023-11-03 RX ORDER — CALCIUM CARBONATE 600 MG
TABLET ORAL EVERY 12 HOURS
COMMUNITY

## 2023-11-03 RX ORDER — CLONIDINE HYDROCHLORIDE 0.1 MG/1
TABLET ORAL EVERY 24 HOURS
COMMUNITY

## 2023-11-03 RX ORDER — NIFEDIPINE 60 MG/1
TABLET, FILM COATED, EXTENDED RELEASE ORAL EVERY 24 HOURS
COMMUNITY

## 2023-11-03 RX ORDER — MAGNESIUM GLUCONATE 27 MG(500)
500 TABLET ORAL
COMMUNITY

## 2023-11-03 RX ORDER — ASPIRIN 81 MG/1
TABLET ORAL EVERY 24 HOURS
COMMUNITY

## 2023-11-03 RX ORDER — LANOLIN ALCOHOL/MO/W.PET/CERES
1 CREAM (GRAM) TOPICAL
COMMUNITY

## 2023-11-03 RX ORDER — AZELASTINE HYDROCHLORIDE, FLUTICASONE PROPIONATE 137; 50 UG/1; UG/1
1 SPRAY, METERED NASAL
COMMUNITY

## 2023-11-03 RX ORDER — GUAIFENESIN 1200 MG
1 TABLET, EXTENDED RELEASE 12 HR ORAL
COMMUNITY

## 2023-11-03 RX ORDER — HYDROCODONE BITARTRATE AND ACETAMINOPHEN 5; 325 MG/1; MG/1
TABLET ORAL
COMMUNITY

## 2023-11-03 RX ORDER — AMITRIPTYLINE HYDROCHLORIDE 25 MG/1
1 TABLET, FILM COATED ORAL NIGHTLY
COMMUNITY

## 2023-11-03 RX ORDER — LOSARTAN POTASSIUM 100 MG/1
TABLET ORAL EVERY 24 HOURS
COMMUNITY

## 2023-11-03 RX ORDER — ZINC GLUCONATE 50 MG
TABLET ORAL EVERY 24 HOURS
COMMUNITY

## 2023-11-03 NOTE — PROGRESS NOTES
Patient ID: Marli Canales is a 83 y.o. female who presents for New Patient Visit (Endocrine consult. Referred by Dr. Gracia Little for her parathyroid.).  HPI    The patient is referred for evaluation of primary hyperparathyroidism.    This is an 83-year-old female who initially developed hypercalcemia going back to 2018.    More recently in April she had an ionized calcium of 1.37 and a PTH of 150 with a total calcium of 10.6 and albumin of 4.5.    In July with a calcium of 11.4 albumin 5.0 she had a PTH of 149.    In August she had a calcium of 10.4.    She has no symptoms of hypercalcemia has had no kidney stones and has had no fragility fractures.    She did have a bone density in 2020 that did not show osteoporosis and is having a repeat one done next month.    She had a 24-hour urine for calcium that was 104 in September.    She has had a chronic cough for the past 16.5 years this started when she was started on lisinopril but has not stopped.    She has had extensive work-up with no explanation.    She has a TI-RADS three 2.7 cm thyroid nodule biopsied initially in 2015 and pathology was benign follicular nodule.    She had a repeat ultrasound in May 2022 which was slightly enlarged is following with Dr. Gutierrez and is having a repeat ultrasound next year.    She has a past history of dementia knee replacement breast cancer postlumpectomy.    Socially she is  retired non-smoker nondrinker.    Family history negative for diabetes or thyroid.      ROS  Comprehensive review of systems is negative.    Objective   Physical Exam  Visit Vitals  /74      Vitals:    11/03/23 1201   Weight: 73.9 kg (163 lb)      Body mass index is 29.81 kg/m².      Alert and oriented x3  In no distress  No focal neurologic deficits  No supraclavicular, or dorsal fat  No purple striae  Integument intact  Eyes normal  ENT normal. No adenopathy  Thyroid palpable and normal. No nodules  Chest clear to auscultation  Heart  sounds are normal  Abdomen nontender. Bowel sounds normal. No organomegaly  Feet are okay  Reflexes normal with normal return    Assessment/Plan     1.  Hypercalcemia  2.  Hyperparathyroidism  3.  Thyroid nodule  4.  Hypertension  5.  Cough    We reviewed her blood work.    We discussed the diagnosis being primary hyperparathyroidism.    We discussed that the only curative option is surgery.    We discussed that there does not appear to be an indication for surgery.    We discussed the cough and I advised that I think it is unlikely that it is caused by the calcium especially given the timeframe and that surgery would be unlikely to cure the problem.    We discussed the thyroid nodule.    She will be following up with Dr. Gutierrez.    She will follow-up with me if needed.    I spent 60 minutes with this patient.  Greater than 50% of this time was spent in counseling and/or coordination of care.

## 2023-11-10 ENCOUNTER — OFFICE VISIT (OUTPATIENT)
Dept: PRIMARY CARE | Facility: CLINIC | Age: 83
End: 2023-11-10
Payer: MEDICARE

## 2023-11-10 DIAGNOSIS — F01.50 VASCULAR DEMENTIA WITHOUT BEHAVIORAL DISTURBANCE (MULTI): ICD-10-CM

## 2023-11-10 DIAGNOSIS — R05.3 CHRONIC COUGH: Primary | ICD-10-CM

## 2023-11-10 DIAGNOSIS — R06.09 CHRONIC DYSPNEA: ICD-10-CM

## 2023-11-10 DIAGNOSIS — Z79.899 CONTROLLED SUBSTANCE AGREEMENT SIGNED: ICD-10-CM

## 2023-11-10 DIAGNOSIS — R05.9 COUGH, UNSPECIFIED TYPE: ICD-10-CM

## 2023-11-10 LAB — SP GR UR STRIP.AUTO: 1

## 2023-11-10 PROCEDURE — 80307 DRUG TEST PRSMV CHEM ANLYZR: CPT

## 2023-11-10 PROCEDURE — 81003 URINALYSIS AUTO W/O SCOPE: CPT

## 2023-11-10 PROCEDURE — 1125F AMNT PAIN NOTED PAIN PRSNT: CPT | Performed by: INTERNAL MEDICINE

## 2023-11-10 PROCEDURE — 3078F DIAST BP <80 MM HG: CPT | Performed by: INTERNAL MEDICINE

## 2023-11-10 PROCEDURE — 1159F MED LIST DOCD IN RCRD: CPT | Performed by: INTERNAL MEDICINE

## 2023-11-10 PROCEDURE — 99214 OFFICE O/P EST MOD 30 MIN: CPT | Performed by: INTERNAL MEDICINE

## 2023-11-10 PROCEDURE — 3075F SYST BP GE 130 - 139MM HG: CPT | Performed by: INTERNAL MEDICINE

## 2023-11-10 PROCEDURE — 82570 ASSAY OF URINE CREATININE: CPT

## 2023-11-10 RX ORDER — FLUTICASONE PROPIONATE AND SALMETEROL 250; 50 UG/1; UG/1
1 POWDER RESPIRATORY (INHALATION)
Qty: 60 EACH | Refills: 11 | Status: SHIPPED | OUTPATIENT
Start: 2023-11-10 | End: 2024-11-09

## 2023-11-10 RX ORDER — ACETAMINOPHEN AND CODEINE PHOSPHATE 300; 30 MG/1; MG/1
1 TABLET ORAL NIGHTLY
Qty: 30 TABLET | Refills: 2 | Status: SHIPPED | OUTPATIENT
Start: 2023-11-10 | End: 2024-01-15 | Stop reason: SDUPTHER

## 2023-11-10 NOTE — PROGRESS NOTES
Sergio Pierce is an 82 yo F presenting in FU.     1. ER presentation 8.18.23 for near syncope s/p labs mild JAZMIN mild trop leak CT PE neg no further workup     2. Hypercalcemia s/p endo referral 11.3.23 felt primary hyperpara, watchful waiting, no surgical indication     3. MNG s/p bx 2015, surveillence US no need further bx     4. Chronic cough   -tried brittanie, failed   -tried stopping losart - did not affect cough and became hypertensive   -chronic codeine in the past with me   -s/p extensive workup dx'ed LPR and VCD in the past   -apprised of risks,benefits of returning to chronic low potency narcotic   -re-initiated TYlenol #3 1 tab qhs with significant improvement in sleep and QOL   -no constipation, over-sedation or other intolerances  -UDS 11.22, positive for morphine and codeine, metabolite of codeine  -notes cough has become harsher and more productive - CT Chest high-res in 2022 and CT with con 8.23 unrevealing structurally       [ ]update CSA     5. BCA s/p rxn     6. Thoracic spine DJD   -2015 MRI     7. CKD   -surveillence with me     8. HTN, chronic orthostasis   -nifedipine 30 BID   -h/o losart weaned to off in the past     9. DLD, mild carotid artery plaque on 9.2019 ultrasound   -atorva 20     10. MCI versus dementia,  OSH neuro (Neema Suresh) with progression this year  -donepazil and memantine chronically   -MRI brain 8.2020     11. OAB   -Myrbetriq       [ ]repeat uds       I have personally reviewed the OARRS report for SERGIO PIERCE. I have considered the risks of abuse, dependence, addiction and diversion.   Is the patient prescribed a combination of a benzodiazepine and opioid? No.   Last urine drug screening date/ordered today: 10-10-22   Results of last screen: Results as expected.   Controlled Substance Agreement:   I have printed this form and reviewed each line item with the patient and the patient has verbalized understanding.   Date of the last Controlled Substance  Agreement: 4.17.23  OPIOID   What is the patient’s goal of therapy? chronic cough control.  Is this being achieved with current treatment? yes.   Attestation statement: I feel that it is clinically indicated to continue this current medication regimen after consideration of alternative therapies, and other non-opioid treatments.   Opioid Risk Screening:   Opioid Risk Tool   Last opioid risk screening date/ordered today: 11.8.2023 [ ]repeat today   Patient's total score is 0, within range of Low Risk (<= 3).   Pain Scale Screening:   Pain Assessment and Documentation Tool (PADT)   Date of Assessment: 11.10.23  Analgesia:   Patient reports her pain level on average during the past week is 4 on a 0 - 10 scale.   Patient reports that her pain level at its worst during the past week was 2 on a 0 -10 scale.   90 % of pain (cough) has been relieved during the past week per patient   Patient states that the amount of pain relief she is now obtaining from her current pain reliever(s) is enough to make a real difference in her life.  Activities of Daily Living:   Physical functioning: Better   Family relationships: Better   Social relationships: Better   Mood: Better   Sleep patterns: Better   Overall functioning: Better   Adverse Events:   No, SERGIO PIERCE is not experiencing side effects from current pain reliever.  a. Nausea: None  b. Vomiting: None  c. Constipation: None  d. Itching: None  e. Mental cloudiness: None  f. Sweating: None  g. Fatigue: None  h. Drowsiness: None.   Comments:. cough used as surrogate for pain in this case of off label use of low dose codeine.         O   96%   76   121/78     Gen Aox3 NAD well appearing   HEENT mmm pharynx clear no cervical LAD   Eyes sclerae clear   CV rrr nl s1/2 no m/r/g  Pulm CTAB no adventitious sounds   Ext warm dry no edema 2+ DP pulse         A/P   Sergio is an 84 yo F well known to me who presents with her hayden  Nito. She has a longstanding chronic cough  which is extensively worked up with CT, high-res CT, PFTs, ENT referrals, AI referrals with no clear cause. Has failed all cough remedies incl gabapentin trials so finally was started on one tab of Tylenol #3 at bedtime which significant improvement in her sleep and quality of life. She has worsening dementia managed by neurology but not worsened by low dose chronic opiate. She is due for annual urine drug screen today. She has noticed increased sputum production, so will try LABA-ICS inhaler (this was trialed remotely without much change in cough). Will RTO 3 mo

## 2023-11-13 LAB
AMPHETAMINES UR QL SCN: ABNORMAL
BARBITURATES UR QL SCN: ABNORMAL
BZE UR QL SCN: ABNORMAL
CANNABINOIDS UR QL SCN: ABNORMAL
CREAT UR-MCNC: <1 MG/DL (ref 20–320)
PCP UR QL SCN: ABNORMAL

## 2023-12-20 ENCOUNTER — ANCILLARY PROCEDURE (OUTPATIENT)
Dept: RADIOLOGY | Facility: CLINIC | Age: 83
End: 2023-12-20
Payer: MEDICARE

## 2023-12-20 DIAGNOSIS — Z78.0 ASYMPTOMATIC MENOPAUSAL STATE: ICD-10-CM

## 2023-12-20 PROCEDURE — 77080 DXA BONE DENSITY AXIAL: CPT | Performed by: RADIOLOGY

## 2023-12-20 PROCEDURE — 77080 DXA BONE DENSITY AXIAL: CPT

## 2024-01-03 DIAGNOSIS — M19.90 ARTHRITIS: ICD-10-CM

## 2024-01-15 DIAGNOSIS — R05.3 CHRONIC COUGH: ICD-10-CM

## 2024-01-15 DIAGNOSIS — R05.9 COUGH, UNSPECIFIED TYPE: ICD-10-CM

## 2024-01-15 RX ORDER — ACETAMINOPHEN AND CODEINE PHOSPHATE 300; 30 MG/1; MG/1
1 TABLET ORAL NIGHTLY
Qty: 30 TABLET | Refills: 0 | Status: SHIPPED | OUTPATIENT
Start: 2024-01-15 | End: 2024-02-05 | Stop reason: SDUPTHER

## 2024-01-22 RX ORDER — MELOXICAM 15 MG/1
TABLET ORAL
Qty: 100 TABLET | Refills: 2 | Status: SHIPPED | OUTPATIENT
Start: 2024-01-22

## 2024-02-05 DIAGNOSIS — R05.9 COUGH, UNSPECIFIED TYPE: ICD-10-CM

## 2024-02-05 DIAGNOSIS — R05.3 CHRONIC COUGH: ICD-10-CM

## 2024-02-05 RX ORDER — ACETAMINOPHEN AND CODEINE PHOSPHATE 300; 30 MG/1; MG/1
1 TABLET ORAL NIGHTLY
Qty: 30 TABLET | Refills: 0 | Status: SHIPPED | OUTPATIENT
Start: 2024-02-05 | End: 2024-03-20 | Stop reason: SDUPTHER

## 2024-02-09 ENCOUNTER — APPOINTMENT (OUTPATIENT)
Dept: PRIMARY CARE | Facility: CLINIC | Age: 84
End: 2024-02-09
Payer: MEDICARE

## 2024-02-19 DIAGNOSIS — R05.3 CHRONIC COUGH: ICD-10-CM

## 2024-02-19 DIAGNOSIS — R05.9 COUGH, UNSPECIFIED TYPE: ICD-10-CM

## 2024-02-22 DIAGNOSIS — I10 PRIMARY HYPERTENSION: ICD-10-CM

## 2024-02-23 RX ORDER — NIFEDIPINE 30 MG/1
30 TABLET, FILM COATED, EXTENDED RELEASE ORAL 2 TIMES DAILY
Qty: 200 TABLET | Refills: 0 | Status: SHIPPED | OUTPATIENT
Start: 2024-02-23 | End: 2024-05-04 | Stop reason: SDUPTHER

## 2024-02-23 RX ORDER — ACETAMINOPHEN AND CODEINE PHOSPHATE 300; 30 MG/1; MG/1
1 TABLET ORAL NIGHTLY
Qty: 30 TABLET | Refills: 0 | OUTPATIENT
Start: 2024-02-23

## 2024-03-15 ENCOUNTER — OFFICE VISIT (OUTPATIENT)
Dept: PRIMARY CARE | Facility: CLINIC | Age: 84
End: 2024-03-15
Payer: MEDICARE

## 2024-03-15 VITALS
WEIGHT: 158 LBS | HEART RATE: 80 BPM | SYSTOLIC BLOOD PRESSURE: 163 MMHG | BODY MASS INDEX: 28.9 KG/M2 | DIASTOLIC BLOOD PRESSURE: 93 MMHG

## 2024-03-15 DIAGNOSIS — R05.3 CHRONIC COUGH: ICD-10-CM

## 2024-03-15 DIAGNOSIS — R05.9 COUGH, UNSPECIFIED TYPE: ICD-10-CM

## 2024-03-15 DIAGNOSIS — L98.9 SKIN LESION: Primary | ICD-10-CM

## 2024-03-15 PROCEDURE — 99214 OFFICE O/P EST MOD 30 MIN: CPT | Performed by: INTERNAL MEDICINE

## 2024-03-15 PROCEDURE — 3077F SYST BP >= 140 MM HG: CPT | Performed by: INTERNAL MEDICINE

## 2024-03-15 PROCEDURE — 3080F DIAST BP >= 90 MM HG: CPT | Performed by: INTERNAL MEDICINE

## 2024-03-15 RX ORDER — ACETAMINOPHEN AND CODEINE PHOSPHATE 300; 30 MG/1; MG/1
1 TABLET ORAL NIGHTLY
Qty: 30 TABLET | Refills: 0 | Status: CANCELLED | OUTPATIENT
Start: 2024-03-15

## 2024-03-15 NOTE — PROGRESS NOTES
Sergio Pierce is an 84 yo F presenting in FU.        *Fall 2 wks ago   -occurred in bathroom overnight   -cried out for    -no LOC   -sig bruising, nasal bridge lac         Chronic PL:   1. ER presentation 8.18.23 for near syncope s/p labs mild JAZMNI mild trop leak CT PE neg no further workup      2. Hypercalcemia s/p endo referral 11.3.23 felt primary hyperpara, watchful waiting, no surgical indication      3. MNG s/p bx 2015, surveillence US no need further bx      4. Chronic cough   -tried brittanie, failed   -tried stopping losart - did not affect cough and became hypertensive   -chronic codeine in the past with me   -s/p extensive workup dx'ed LPR and VCD in the past   -apprised of risks,benefits of returning to chronic low potency narcotic   -re-initiated TYlenol #3 1 tab qhs with significant improvement in sleep and QOL   -no constipation, over-sedation or other intolerances  -UDS 11.22, positive for morphine and codeine, metabolite of codeine  -notes cough has become harsher and more productive - CT Chest high-res in 2022 and CT with con 8.23 unrevealing structurally           5. BCA s/p rxn      6. Thoracic spine DJD   -2015 MRI      7. CKD   -surveillence with me      8. HTN, chronic orthostasis   -nifedipine 30 BID   -h/o losart weaned to off in the past      9. DLD, mild carotid artery plaque on 9.2019 ultrasound   -atorva 20      10. MCI versus dementia,  OS neuro (Neema Suresh) with progression this year  -donepazil and memantine chronically   -MRI brain 8.2020   -FU neuro in interval      11. OAB   -Myrbetriq                 I have personally reviewed the OARRS report for SERGIO PIERCE. I have considered the risks of abuse, dependence, addiction and diversion.   Is the patient prescribed a combination of a benzodiazepine and opioid? No.   Last urine drug screening date/ordered today: 10-10-22   Results of last screen: Results as expected.   Controlled Substance Agreement:   I have  printed this form and reviewed each line item with the patient and the patient has verbalized understanding.   Date of the last Controlled Substance Agreement: 4.17.23  OPIOID   What is the patient’s goal of therapy? chronic cough control.  Is this being achieved with current treatment? yes.   Attestation statement: I feel that it is clinically indicated to continue this current medication regimen after consideration of alternative therapies, and other non-opioid treatments.   Opioid Risk Screening:   Opioid Risk Tool   Last opioid risk screening date/ordered today: 11.8.2023   Patient's total score is 0, within range of Low Risk (<= 3).   Pain Scale Screening:   Pain Assessment and Documentation Tool (PADT)   Date of Assessment: 3.15.23  Analgesia:   Patient reports her pain level on average during the past week is 7 (cough severity used as indices of 'pain' )  on a 0 - 10 scale.   Patient reports that her pain level at its worst during the past week was 2 on a 0 -10 scale.   90 % of pain (cough) has been relieved during the past week per patient   Patient states that the amount of pain relief she is now obtaining from her current pain reliever(s) is enough to make a real difference in her life.  Activities of Daily Living:   Physical functioning: Better   Family relationships: Better   Social relationships: Better   Mood: Better   Sleep patterns: Better   Overall functioning: Better   Adverse Events:   No, SERGIO PIERCE is not experiencing side effects from current pain reliever.  a. Nausea: None  b. Vomiting: None  c. Constipation: None  d. Itching: None  e. Mental cloudiness: None  f. Sweating: None  g. Fatigue: None  h. Drowsiness: None.   Comments:. cough used as surrogate for pain in this case of off label use of low dose codeine.

## 2024-03-19 DIAGNOSIS — R05.9 COUGH, UNSPECIFIED TYPE: ICD-10-CM

## 2024-03-19 DIAGNOSIS — R05.3 CHRONIC COUGH: ICD-10-CM

## 2024-03-20 DIAGNOSIS — R05.9 COUGH, UNSPECIFIED TYPE: ICD-10-CM

## 2024-03-20 DIAGNOSIS — R05.3 CHRONIC COUGH: ICD-10-CM

## 2024-03-20 RX ORDER — ACETAMINOPHEN AND CODEINE PHOSPHATE 300; 30 MG/1; MG/1
1 TABLET ORAL NIGHTLY
Qty: 30 TABLET | Refills: 0 | OUTPATIENT
Start: 2024-03-20

## 2024-03-20 RX ORDER — ACETAMINOPHEN AND CODEINE PHOSPHATE 300; 30 MG/1; MG/1
1 TABLET ORAL NIGHTLY
Qty: 30 TABLET | Refills: 0 | Status: SHIPPED | OUTPATIENT
Start: 2024-03-20

## 2024-04-09 DIAGNOSIS — N32.81 OVERACTIVE BLADDER: ICD-10-CM

## 2024-04-12 RX ORDER — MIRABEGRON 50 MG/1
50 TABLET, EXTENDED RELEASE ORAL DAILY
Qty: 90 TABLET | Refills: 2 | Status: SHIPPED | OUTPATIENT
Start: 2024-04-12

## 2024-05-04 DIAGNOSIS — I10 PRIMARY HYPERTENSION: ICD-10-CM

## 2024-05-06 DIAGNOSIS — I10 PRIMARY HYPERTENSION: ICD-10-CM

## 2024-05-06 RX ORDER — NIFEDIPINE 30 MG/1
30 TABLET, FILM COATED, EXTENDED RELEASE ORAL 2 TIMES DAILY
Qty: 200 TABLET | Refills: 1 | Status: SHIPPED | OUTPATIENT
Start: 2024-05-06 | End: 2024-05-06 | Stop reason: SDUPTHER

## 2024-05-06 RX ORDER — NIFEDIPINE 30 MG/1
30 TABLET, FILM COATED, EXTENDED RELEASE ORAL 2 TIMES DAILY
Qty: 200 TABLET | Refills: 1 | Status: SHIPPED | OUTPATIENT
Start: 2024-05-06

## 2024-05-09 ENCOUNTER — OFFICE VISIT (OUTPATIENT)
Dept: DERMATOLOGY | Facility: CLINIC | Age: 84
End: 2024-05-09
Payer: MEDICARE

## 2024-05-09 DIAGNOSIS — D48.5 NEOPLASM OF UNCERTAIN BEHAVIOR OF SKIN: ICD-10-CM

## 2024-05-09 DIAGNOSIS — L57.0 ACTINIC KERATOSIS: Primary | ICD-10-CM

## 2024-05-09 PROCEDURE — 11103 TANGNTL BX SKIN EA SEP/ADDL: CPT | Performed by: DERMATOLOGY

## 2024-05-09 PROCEDURE — 11102 TANGNTL BX SKIN SINGLE LES: CPT | Performed by: DERMATOLOGY

## 2024-05-09 PROCEDURE — 99202 OFFICE O/P NEW SF 15 MIN: CPT | Performed by: DERMATOLOGY

## 2024-05-09 PROCEDURE — 1159F MED LIST DOCD IN RCRD: CPT | Performed by: DERMATOLOGY

## 2024-05-09 ASSESSMENT — DERMATOLOGY QUALITY OF LIFE (QOL) ASSESSMENT
RATE HOW BOTHERED YOU ARE BY SYMPTOMS OF YOUR SKIN PROBLEM (EG, ITCHING, STINGING BURNING, HURTING OR SKIN IRRITATION): 0 - NEVER BOTHERED
DATE THE QUALITY-OF-LIFE ASSESSMENT WAS COMPLETED: 66969
ARE THERE EXCLUSIONS OR EXCEPTIONS FOR THE QUALITY OF LIFE ASSESSMENT: NO
RATE HOW BOTHERED YOU ARE BY EFFECTS OF YOUR SKIN PROBLEMS ON YOUR ACTIVITIES (EG, GOING OUT, ACCOMPLISHING WHAT YOU WANT, WORK ACTIVITIES OR YOUR RELATIONSHIPS WITH OTHERS): 0 - NEVER BOTHERED
RATE HOW EMOTIONALLY BOTHERED YOU ARE BY YOUR SKIN PROBLEM (FOR EXAMPLE, WORRY, EMBARRASSMENT, FRUSTRATION): 0 - NEVER BOTHERED

## 2024-05-09 ASSESSMENT — PATIENT GLOBAL ASSESSMENT (PGA): PATIENT GLOBAL ASSESSMENT: PATIENT GLOBAL ASSESSMENT:  1 - CLEAR

## 2024-05-09 ASSESSMENT — ITCH NUMERIC RATING SCALE: HOW SEVERE IS YOUR ITCHING?: 0

## 2024-05-09 ASSESSMENT — DERMATOLOGY PATIENT ASSESSMENT: DO YOU HAVE ANY NEW OR CHANGING LESIONS: NO

## 2024-05-09 NOTE — PROGRESS NOTES
Subjective     Marli Canales is a 84 y.o. female who presents for the following: Suspicious Skin Lesion (Pts PCP, Dr. Lima Vital was concerned about spots on Face and Neck. Pt accompanied by .).     Review of Systems:  No other skin or systemic complaints other than what is documented elsewhere in the note.    The following portions of the chart were reviewed this encounter and updated as appropriate:   Allergies  Problems  Med Hx  Surg Hx         Skin Cancer History  No skin cancer on file.      Specialty Problems          Dermatology Problems    Adult acne        Objective   Well appearing patient in no apparent distress; mood and affect are within normal limits.    A focused skin examination was performed. All findings within normal limits unless otherwise noted below.    Assessment/Plan   1. Actinic keratosis  Mid Forehead  Erythematous scaly macule(s)    Discussed treatment with liquid nitrogen but decline at this time due to age, reconsider at future visit    Related Procedures  Follow Up In Dermatology - Established Patient    2. Neoplasm of uncertain behavior of skin (2)  Left Parotid Area  1.8x1 cm brown-pink macule with cords and crypts at perihpery          Lesion biopsy  Type of biopsy: tangential    Informed consent: discussed and consent obtained    Timeout: patient name, date of birth, surgical site, and procedure verified    Procedure prep:  Patient was prepped and draped  Anesthesia: the lesion was anesthetized in a standard fashion    Anesthetic:  1% lidocaine w/ epinephrine 1-100,000 local infiltration  Instrument used: DermaBlade    Hemostasis achieved with: aluminum chloride    Outcome: patient tolerated procedure well    Post-procedure details: sterile dressing applied and wound care instructions given    Dressing type: petrolatum and bandage      Staff Communication: Dermatology Local Anesthesia: 1 % Lidocaine / Epinephrine - Amount: 3 ml    Specimen 1 - Dermatopathology-  DERM LAB  Differential Diagnosis: inflamed seborrheic keratosis vs basal cell carcinoma   Check Margins Yes/No?:    Comments:    Dermpath Lab: Routine Histopathology (formalin-fixed tissue)    Left Upper Arm - Posterior  1.8x1 cm pink-brown crusted and irregular plaque          Lesion biopsy  Type of biopsy: tangential    Informed consent: discussed and consent obtained    Timeout: patient name, date of birth, surgical site, and procedure verified    Procedure prep:  Patient was prepped and draped  Anesthesia: the lesion was anesthetized in a standard fashion    Anesthetic:  1% lidocaine w/ epinephrine 1-100,000 local infiltration  Instrument used: DermaBlade    Hemostasis achieved with: aluminum chloride    Outcome: patient tolerated procedure well    Post-procedure details: sterile dressing applied and wound care instructions given    Dressing type: petrolatum and bandage      Staff Communication: Dermatology Local Anesthesia: 1 % Lidocaine / Epinephrine - Amount: 3 ml    Specimen 2 - Dermatopathology- DERM LAB  Differential Diagnosis: squamous cell carcinoma vs basal cell carcinoma vs inflamed seborrheic keratosis   Check Margins Yes/No?:    Comments:    Dermpath Lab: Routine Histopathology (formalin-fixed tissue)        Follow up 6 months Full Skin Exam or sooner pending path

## 2024-05-13 LAB
LABORATORY COMMENT REPORT: NORMAL
PATH REPORT.FINAL DX SPEC: NORMAL
PATH REPORT.GROSS SPEC: NORMAL
PATH REPORT.MICROSCOPIC SPEC OTHER STN: NORMAL
PATH REPORT.RELEVANT HX SPEC: NORMAL
PATH REPORT.TOTAL CANCER: NORMAL

## 2024-06-24 ENCOUNTER — OFFICE VISIT (OUTPATIENT)
Dept: NEUROLOGY | Facility: CLINIC | Age: 84
End: 2024-06-24
Payer: MEDICARE

## 2024-06-24 ENCOUNTER — APPOINTMENT (OUTPATIENT)
Dept: GERIATRIC MEDICINE | Facility: CLINIC | Age: 84
End: 2024-06-24
Payer: MEDICARE

## 2024-06-24 VITALS
BODY MASS INDEX: 27.44 KG/M2 | SYSTOLIC BLOOD PRESSURE: 149 MMHG | WEIGHT: 150 LBS | RESPIRATION RATE: 18 BRPM | TEMPERATURE: 99.4 F | DIASTOLIC BLOOD PRESSURE: 91 MMHG | HEART RATE: 83 BPM

## 2024-06-24 DIAGNOSIS — F02.818 FRONTOTEMPORAL DEMENTIA WITH BEHAVIORAL DISTURBANCE (MULTI): Primary | ICD-10-CM

## 2024-06-24 DIAGNOSIS — G31.09 FRONTOTEMPORAL DEMENTIA WITH BEHAVIORAL DISTURBANCE (MULTI): Primary | ICD-10-CM

## 2024-06-24 PROCEDURE — 99205 OFFICE O/P NEW HI 60 MIN: CPT | Performed by: PSYCHIATRY & NEUROLOGY

## 2024-06-24 PROCEDURE — 99215 OFFICE O/P EST HI 40 MIN: CPT | Performed by: PSYCHIATRY & NEUROLOGY

## 2024-06-24 PROCEDURE — 1159F MED LIST DOCD IN RCRD: CPT | Performed by: PSYCHIATRY & NEUROLOGY

## 2024-06-24 PROCEDURE — 1126F AMNT PAIN NOTED NONE PRSNT: CPT | Performed by: PSYCHIATRY & NEUROLOGY

## 2024-06-24 PROCEDURE — 1170F FXNL STATUS ASSESSED: CPT | Performed by: PSYCHIATRY & NEUROLOGY

## 2024-06-24 PROCEDURE — 3080F DIAST BP >= 90 MM HG: CPT | Performed by: PSYCHIATRY & NEUROLOGY

## 2024-06-24 PROCEDURE — 3077F SYST BP >= 140 MM HG: CPT | Performed by: PSYCHIATRY & NEUROLOGY

## 2024-06-24 PROCEDURE — 1036F TOBACCO NON-USER: CPT | Performed by: PSYCHIATRY & NEUROLOGY

## 2024-06-24 RX ORDER — MEMANTINE HYDROCHLORIDE 10 MG/1
10 TABLET ORAL 2 TIMES DAILY
Qty: 240 TABLET | Refills: 2 | Status: SHIPPED | OUTPATIENT
Start: 2024-06-24 | End: 2025-06-24

## 2024-06-24 RX ORDER — MIRABEGRON 25 MG/1
12.5 TABLET, FILM COATED, EXTENDED RELEASE ORAL DAILY
COMMUNITY

## 2024-06-24 ASSESSMENT — GERIATRIC DEPRESSION SCALE SHORT VERSION (GDS-SV)
ARE YOU IN GOOD SPIRITS MOST OF THE TIME: YES
DO YOU THINK IT IS WONDERFUL TO BE ALIVE NOW: YES
ARE YOU AFRAID THAT SOMETHING BAD IS GOING TO HAPPEN TO YOU: NO
DO YOU FEEL THAT YOUR LIFE IS EMPTY: NO
HAVE YOU DROPPED MANY OF YOUR ACTIVITIES AND INTERESTS?: YES
ARE YOU BASICALLY SATISFIED WITH YOUR LIFE: YES
DO YOU FEEL HAPPY MOST OF THE TIME: YES
DO YOU OFTEN GET BORED: NO
DO YOU PREFER TO STAY AT HOME, RATHER THAN GOING OUT AND DOING NEW THINGS: YES
DO YOU FEEL FULL OF ENERGY: YES
DO YOU FEEL YOU HAVE MORE PROBLEMS WITH MEMORY THAN MOST: NO
DO YOU OFTEN FEEL HELPLESS: NO
DO YOU THINK THAT MOST PEOPLE ARE BETTER OFF THAN YOU ARE: NO
GDS TOTAL SCORE: 2
DO YOU FEEL PRETTY WORTHLESS THE WAY YOU ARE NOW: NO
DO YOU FEEL THAT YOUR SITUATION IS HOPELESS: NO

## 2024-06-24 ASSESSMENT — MINI MENTAL STATE EXAM
SAY:  READ THE WORDS ON THE PAGE AND THEN DO WHAT IT SAYS.  THEN HAND THE PERSON THE SHEET WITH CLOSE YOUR EYES ON IT.  IF THE SUBJECT READS AND DOES NOT CLOSE THEIR EYES, REPEAT UP TO THREE TIMES.  SCORE ONLY IF SUBJECT CLOSES EYES.: 1 CORRECT
NAME OR REPEAT 3 OBJECTS - (APPLE, TABLE, PENNY) OR (BALL, TREE, FLAG): 3 CORRECT
PLACE DESIGN, ERASER AND PENCIL IN FRONT OF THE PERSON.  SAY:  COPY THIS DESIGN PLEASE.  SHOW: DESIGN. ALLOW: MULTIPLE TRIES. WAIT UNTIL PERSON IS FINISHED AND HANDS IT BACK. SCORE: ONLY FOR DIAGRAM WITH 4-SIDED FIGURE BETWEEN TWO 5-SIDED FIGURES: 1 CORRECT
HAND THE PERSON A PENCIL AND PAPER. SAY:  WRITE ANY COMPLETE SENTENCE ON THAT PIECE OF PAPER. (NOTE: THE SENTENCE MUST MAKE SENSE.  IGNORE SPELLING ERRORS): 1 CORRECT
PLEASE COPY THIS PICTURE (NOTE ALL 10 ANGLES MUST BE PRESENT AND TWO MUST INTERSECT): 0 CORRECT
RECALL THE 3 OBJECTS FROM ABOVE (APPLE, TABLE, PENNY) OR (BALL, TREE, FLAG): 0 CORRECT / UNABLE TO SCORE
SHOW: PENCIL [OBJECT] ASK: WHAT IS THIS CALLED?: 2 CORRECT
SPELL THE WORD WORLD FORWARD AND BACKWARDS OR SERIAL 7S: 5 CORRECT
WHAT STATE, COUNTRY, CITY, HOSPITAL, FLOOR: 2 CORRECT
SAY: I WOULD LIKE YOU TO REPEAT THIS PHRASE AFTER ME: NO IFS, ANDS, OR BUTS.: 1 CORRECT
WHAT IS THE YEAR, SEASON, DATE, DAY, AND MONTH: 4 CORRECT
SUM ALL MMSE QUESTIONS FOR TOTAL SCORE [OUT OF 30].: 20

## 2024-06-24 ASSESSMENT — ACTIVITIES OF DAILY LIVING (ADL)
HEARING - LEFT EAR: FUNCTIONAL
USING_TRANSPORTATION: NEEDS ASSISTANCE
PREPARING_MEALS: TOTAL CARE
JUDGMENT_ADEQUATE_SAFELY_COMPLETE_DAILY_ACTIVITIES: YES
MANAGING_FINANCES: NEEDS ASSISTANCE
NEEDS_ASSISTANCE_WITH_FOOD: INDEPENDENT
USING_TELEPHONE: INDEPENDENT
PILL_BOX_USED: YES
ADEQUATE_TO_COMPLETE_ADL: YES
WALKS IN HOME: INDEPENDENT
TAKING_MEDICATION: NEEDS ASSISTANCE
PATIENT'S MEMORY ADEQUATE TO SAFELY COMPLETE DAILY ACTIVITIES?: NO
FEEDING YOURSELF: INDEPENDENT
DOING_HOUSEWORK: NEEDS ASSISTANCE
BATHING: NEEDS ASSISTANCE
HEARING - RIGHT EAR: FUNCTIONAL
EATING: INDEPENDENT
GROCERY_SHOPPING: TOTAL CARE
DRESSING YOURSELF: INDEPENDENT
TOILETING: INDEPENDENT
GROOMING: NEEDS ASSISTANCE
STILL_DRIVING: NO

## 2024-06-24 ASSESSMENT — ENCOUNTER SYMPTOMS
DEPRESSION: 0
OCCASIONAL FEELINGS OF UNSTEADINESS: 0
LOSS OF SENSATION IN FEET: 0

## 2024-06-24 ASSESSMENT — PATIENT HEALTH QUESTIONNAIRE - PHQ9
2. FEELING DOWN, DEPRESSED OR HOPELESS: NOT AT ALL
1. LITTLE INTEREST OR PLEASURE IN DOING THINGS: NOT AT ALL
SUM OF ALL RESPONSES TO PHQ9 QUESTIONS 1 AND 2: 0

## 2024-06-24 ASSESSMENT — PAIN SCALES - GENERAL: PAINLEVEL: 0-NO PAIN

## 2024-06-24 NOTE — PROGRESS NOTES
[place], [place]      Department of Neurology  [facility]    Initial Consultation    PCP: Dr. Lima Grant     2024  Re: Marli Canales  :  1940  MRN [id number]    CC:  83yo woman referred for evaluation of cognitive impairment. Info from pt, husb & EMR.    A&P: History: Lifelong outstanding athlete, then breast CA chemoàcognitive decline.   Genl exam w/ crowded ocular discs and freq cardiac ectopy.  Neuro exam w/ distal LE decr in    Sensation.  Cognitive exam somewhat disorganized/agrammatical speech, but she is making a good effort, poor Lt pen twirl (transitive dyspraxia), poor Luria motor sequence learning, and poor imbedded figures visual perception.  Testing: MoCA=20/30 c/w moderate impairment, AD8=8/8 reflecting high caregiver burden.  Labs:  GFR=37 and prev Fkdmig=501. Derm path w/ LtArm squam carcinoma, EKG w/ ZNjBds=107/incRBBB.  Head CT w/ severe supratentoral small vessel ischemic disease.       Interpret: Pt with breast CA chemo then with labile HTN and emergence of gradually progressive cognitive decline with CT suggesting primarily cerebral small vessel disease.     Plan: I am concerned about persisting with memantine in the face of chronic renal failure with GFR=37 and I will discontinue it now after discussion w/ pt and .        F/U:  We discussed all of these matters in full.  I will arrange brain MRI to better evaluate cerebrovascular compromise with EEG to r/o focal cortical dysfunction possibly related to frequent lapses of speech alternating with improvement.  RTC in 3 months.     Thank you for allowing me to participate in your care.   Sincerely yours, Silvino Millan M.D., Ph.D.    History of Present Illness:   : Lifelong highly athletic active competitor in major sports (gold, tennis, skiing) gradually declined. 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 Dr. Suresh has been managing the pt's cognitive/behavioral decline.  2021:  Pt's BPs become uncontrollably high and verapamil then several other meds with irregular control.  Pt started to get lost in driving, sometimes with substantial excursions.  Fainted on a hot day, ambud to hosp leading to reducing the previously augmented antiHTN regimen.  Dr Suresh suggested driving cessation that was subsequently adopted by the pt & .   2022: Emergence of cognitive changes, response that didn't make sense in her [employer] group meetings.  Forgetting simple activities at home, worsening in to 2023.   assumed responsibilities for home, kitchen, upkeep and all driving.   [id number]:  Repeated Urn&BM in her pants, at [employer] and other activities.  Recently: Occasionally, she does things that “don't make sense,” asked for cell phone and repeatedly retrieved other objects; with variety of object misidentifications.  Had been very social, withdrew, then recently re-engaged with socially inapprop convers that prompted her withdrawal.  Overall, gradually cumulative functional decline with some perseveration regarding things she finds to be humorous.     Med Hx   Allergies: NKAD  Rx:  [id number],     [id number],       [id number], [id number],     [id number], [id number], qrozdj69,        swjdvy946-03 inh,  srtsfqieu43,   tylenol-hqqrmrg3lk, hydrocod-AO5-325, YHPM604, Zn-Gluc, benzocaine geovanny,  vitC  H/O: arslan depress, alterMS, vasc dementia, dizzy/giddy,  TIA (~2018), MCI, gait dis,     Breast CA ([id number])  RtOccipNeural, Cradiculop, CTS, syncope,  hyper/hypotension,  hyperParaT, thyroid nodule  S/P: Lt cheek bx, Rt mastxàlymphedema    Implants: none  Trauma: none    FHx  Par: Dad d80s hrt dis;  Mom inpt psychiatric pt d46 suicide     Sibs: sis d47 breast CA, bro estranged  Kids: pt has 4 children ( 9 children)    PSHx  Marital:   Home: [facility]    Educ: college classes  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   Driving: stopped Sleep: 10pm to morning     Exercise: excessànone   Firearms: none  EtOH: wine w/ dinner, less    Smoking: stopped       Subs Abuse: none         ROS  Genl: w/o Skin-Skel nl     Cardio-pulm 35yrs of greatly labile HTN   U/L-GI nl    Neuro:  w/o LBP nl  w/o neck pain wnl w/o HAs wnl   h/o gait instab dxd TIA     Physical Exam  Vitals: AD=101/104, HR=80, RR=18, go=554  General:  Neck FROM w/o pain  Eye gnds w/ crowded discs nl vsls    Lungs w/ clear,     freq ectopy w/o MRG, full carotids w/o T/B        Abdo soft +BS no bruit       Extrems w/o depend edema    Neurological Exam  Mental State: Affect:  flat affect at rest but readily responsive to my social expression   not anxious or sad  w/ masked but preserved range    no hallucins, delusions, or agitation  Cranial Nerves: Vision: PERR subtly react to light,  VFF OU to finger approach     Versions: FEOM horiz & vert  w/o nystagmus or diplopia  lids w/o ptosis   Motility:  intact saccs, pursuit, and hOKNs   Face: Sens symm to LT & cold,     Expression: symm tone & mvmt   Hearing to voice wnl,  Tongue: mobile w/o fascics    Shoulders: symm mobile  Motor: strong proportionate to bulk      w/o UE drift            w/ steady  bilat   tone: relaxed and symm         w/o invol mvmts  w/o synkinesia    Reflexes: MSRs +2 throughout  w/o clonus  or spread   Release:  no Price's/grasp/palmoment,    glabellar=0 blinks,      no snout/root/suck  Sensation:  symm LT, cold, and vib face > hands > ankles   -Romberg,  little, symm sway  Coordination: FT fast w/ reg pace bilat     FNF accurate & symm to fixed target     Gait: nl pace, stride, armswing  wnl turning in 3.0 steps,   stable tandem forward     Cognitive Exam   Handedness:  fully RH     Language:  primary: American English    recept:  answers & follows instrucs wnl       express: scant 1-2 word phrases    no full sents,     but w/  she  is recovering her prev talkitiveness         word-finding w/o pauses   w/o paraphasias   repeat:  complete w nl pace    name:  body parts=3/3   objects=5/5       read:  pace & rhythm wnl          w/o paralexias    Praxis:  intrans: hand shapes to model=3/3  transitive: pen twirling  Rt wnl Lt fails    Luria Seq Learn:  Rt on Lt:  wnl /p 1 demos        Lt on Rt w/ fails /p 5 demos     Percept:  visual: traffic signs=4/4,  imbedded objs (w/o clutter)=0/5       Memory: visspat: room topology (door, pc, prev room) =3/3     Executive:  Color-name Stroop: names=0/9 errors   colors= 0/9 errors        Testing MoCA=20/30 c/w mod impairment,     AD8=8/8 c/w chronic arslan mental illness    Labs (to 11-):  wbc=7.8,    hct=45.7,    tzo=632   HStrop=18, 18  ajm=760,   bun/crt=31/1.41,     GFR=37,  Hzrjxl=798 TSH=2.74       DermPath (5-9-2024): Lt parotid: actinic keratosis, LtUpArm:squam carcinoma  EKG (7-): NSR=81, RZrE=305 (CLwKws=252)  Abnls: 1stAVB, incomp RBBB,    Carotid dup (9-4-2019): Minimal bilateral carotid plaque; no stenosis greater than 50%   Head CT (8-): 1. No acute intracran abnl.  2. Stable sev supratent sml vsl dis.

## 2024-06-24 NOTE — PROGRESS NOTES
"Subjective   Patient ID: Marli Canales is a 84 y.o. female who presents for 2 years of cognitive changes, more evident since October.    Identifying Information                              : 1940           Assessment date: 2024    Objective     Patient accompanied by:  , Javid         History provided by:  with little added by patient    CONCERNS IDENTIFIED BY NURSING AND SOCIAL WORK (Safety Risks, Health Issues, POA not in Chart, etc)     1. Stopped driving last fall because was getting lost     2. \"Chemo-brain\" , TIA 5/6 years ago, confusion & difficulty following conversations started presenting 2 years ago, worsening cognitive changes since 2023 (confusion about processes)     3. Increased incontinence (bowel and urine)     4. 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     Comment: Retired    Tobacco Use    Smoking status: Never    Smokeless tobacco: Never   Vaping Use    Vaping status: Never Used   Substance and Sexual Activity    Alcohol use: Yes     Alcohol/week: 7.0 standard drinks of alcohol     Types: 7 Glasses of wine per week     Comment: glass of wine with dinner daily    Drug use: Never    Sexual activity: None   Other Topics Concern    None   Social History Narrative    None     Social Determinants of Health     Financial Resource Strain: Not on file   Food Insecurity: Not on file   Transportation Needs: Not on file   Physical Activity: Not on file   Stress: Not on file   Social Connections: Not on file   Intimate Partner Violence: Not on file   Housing Stability: Not on file        ENCOUNTER SCREENING RESULTS     MMSE (Mini Mental State Exam)  What is the Year, Season, Date, " "Day, and Month?: 4 correct  Where are we: State, Country, City, Hospital, Floor?: 2 correct  Name / Repeat 3 objects:( Apple, Table, Dolores) or (Ball, Tree, Flag) : 3 correct  Serial 7's backwards or spell World backwards?: 5 correct  Recall the 3 objects from above (apple, table, dolores) or (ball, tree, flag): 0 correct / Unable to score  Name the following: pencil, watch: 2 correct  Repeat the following: \"No ifs, ands, or buts.\": 1 correct  Follow these commands: Take a paper in your right hand, fold it in half, and put it on the floor.: 1 correct  \"Please read this and do what it says\" (Written instruction is \"Close your eyes.\"): 1 correct  Written instruction is \"Make up and write a sentence about anything.\" (This sentence must contain a noun and a verb.): 1 correct  Written instruction is: \"Please copy this picture.\" (All 10 angles must be present and two must intersect.): 0 correct  MMSE Total Score:: 20  Geriatric Depression Scale (Short Version) Total: 2    NURSING ASSESSMENT    ADL Screening  Patient's Vision Adequate to Safely Complete Daily Activities: Yes  Patient's Judgment Adequate to Safely Complete Daily Activities: Yes  Patient's Memory Adequate to Safely Complete Daily Activities: No  Patient Able to Express Needs/Desires: Yes  Which is your dominant hand?: Right  Dressing: Independent  Grooming: Needs assistance  Feeding: Independent  Bathing: Needs assistance  Toileting: Independent  In/Out Bed: Independent  Walks in Home: Independent  Weakness of Legs: None  Weakness of Arms/Hands: None  Hearing - Right Ear: Functional  Hearing - Left Ear: Functional     IADL's  Using Telephone: Independent  Grocery Shopping: Total care (because she doesnt drive)  Preparing Meals: Total care  Doing Housework: Needs assistance (pt assists )  Laundry: Moderate (putting bleach on colored clothes, can do with help forgets steps.)  Taking Medication: Needs assistance  Pill Box Used: Yes  Managing Finances: Needs " assistance ( took over finances when she was diagnosed w/breast cancer, and pt started forgetting things due to chemo)  Using Transportation: Needs assistance  Still Driving: No (getting lost)  Eating: Independent  Needs Assistance With Food: Independent  Difficulty Chewing or Swallowing: No     Safety Concerns  Safety Concerns: None     Nutrition and Exercise  Current Diet: Well Balanced Diet  Adequate Fluid Intake: Yes  Caffeine: Yes (1 cup of coffee w/breakfast)  Appetite:: Good  Food Consistency:: Regular  Liquids Consistency:: Thin  Changes in Weight?: Yes (28 lb weight gain over past year)  Chewing or Swallowing Problems?: No  Exercise Frequency: Infrequently     SOCIAL WORK ASSESSMENT    Advance Directives/Legal/Financial     DPOA for healthcare:  in process        Legal guardian:  NA     Living Will: no     On any form of disability? no If so, explain:     [place]? no  If so, explain     Significant financial stressors: none    Family History      Parent or sibling with neurodegenerative diagnosis: NA     Extended family members with relevant health history: NA    Living Situation      Type of residence: one floor     People living in the home: patient and     Supportive Relationships (Informal Support)     Spouse/partner information:  Javid,  25 years, 2nd marriage. First marriage of 32 years ended in divorce.      Children Information:  She has 4 adult children and Javid has 9 adult children.     Other social supports: Yarsanism groups    Formal Supports     Engaged community services (Emergency Alert, HHA, MOW, Case Management, Etc.): NA    Mental Health     Sleep: 11pm to  6am, naps some days     Energy: okay     Mood: labile     Affect: calm and pleasant     Notable Loss/Grief: NA     Self-harm/suicidal thoughts, plan: None Reported     Interests/Hobbies/Activities/Daily Routine: TV, Bible Study, Bird Watching, Help with gardening     History of outpatient psychiatric services: [facility]     " History of inpatient psychiatric services (hospitalization): NA     Medications for mental health past or present: NA     History of addiction services: NA    Assessment/Plan   Impression:  Patient attended the evaluation with her  of 25 years, Javid. Javid provided most of her personal and health history. He shared patient has been presenting with cognitive decline for about 2 years and they have noticed a significant decline since October. He noted a history of \"chemo-brain\" related to breast cancer treatment in 2007 and vascular issues, specifically a TIA  5 years ago. Patient was quiet and withdrawn but would engage and smile when asked a direct question. When engaged, patient's response was a wide, open-mouth smile and giggling while giving an answer. Patient and her  denied patient having any history of mental health issues.  They did share her mother was mentally unstable and committed suicide after multiple attempts and psychiatric hospitalizations in her mid 40s. Patient completed screenings without issue and didn't get frustrated with challenging tasks. She has strong support from Javid and they presented with very positive, supportive interactions.     Plan: Review team evaluation results and share information/resources as indicated.  "

## 2024-07-11 ENCOUNTER — APPOINTMENT (OUTPATIENT)
Dept: DERMATOLOGY | Facility: CLINIC | Age: 84
End: 2024-07-11
Payer: MEDICARE

## 2024-07-11 DIAGNOSIS — D09.9 SQUAMOUS CELL CARCINOMA IN SITU: Primary | ICD-10-CM

## 2024-07-11 PROCEDURE — 17263 DSTRJ MAL LES T/A/L 2.1-3.0: CPT | Performed by: DERMATOLOGY

## 2024-07-11 PROCEDURE — 1160F RVW MEDS BY RX/DR IN RCRD: CPT | Performed by: DERMATOLOGY

## 2024-07-11 PROCEDURE — 1159F MED LIST DOCD IN RCRD: CPT | Performed by: DERMATOLOGY

## 2024-07-11 PROCEDURE — 1036F TOBACCO NON-USER: CPT | Performed by: DERMATOLOGY

## 2024-07-11 ASSESSMENT — DERMATOLOGY QUALITY OF LIFE (QOL) ASSESSMENT
RATE HOW BOTHERED YOU ARE BY EFFECTS OF YOUR SKIN PROBLEMS ON YOUR ACTIVITIES (EG, GOING OUT, ACCOMPLISHING WHAT YOU WANT, WORK ACTIVITIES OR YOUR RELATIONSHIPS WITH OTHERS): 0 - NEVER BOTHERED
RATE HOW EMOTIONALLY BOTHERED YOU ARE BY YOUR SKIN PROBLEM (FOR EXAMPLE, WORRY, EMBARRASSMENT, FRUSTRATION): 0 - NEVER BOTHERED
RATE HOW BOTHERED YOU ARE BY SYMPTOMS OF YOUR SKIN PROBLEM (EG, ITCHING, STINGING BURNING, HURTING OR SKIN IRRITATION): 0 - NEVER BOTHERED
RATE HOW EMOTIONALLY BOTHERED YOU ARE BY YOUR SKIN PROBLEM (FOR EXAMPLE, WORRY, EMBARRASSMENT, FRUSTRATION): 0 - NEVER BOTHERED
WHAT SINGLE SKIN CONDITION LISTED BELOW IS THE PATIENT ANSWERING THE QUALITY-OF-LIFE ASSESSMENT QUESTIONS ABOUT: NONE OF THE ABOVE
RATE HOW BOTHERED YOU ARE BY SYMPTOMS OF YOUR SKIN PROBLEM (EG, ITCHING, STINGING BURNING, HURTING OR SKIN IRRITATION): 0 - NEVER BOTHERED
DATE THE QUALITY-OF-LIFE ASSESSMENT WAS COMPLETED: 67032
RATE HOW EMOTIONALLY BOTHERED YOU ARE BY YOUR SKIN PROBLEM (FOR EXAMPLE, WORRY, EMBARRASSMENT, FRUSTRATION): 0 - NEVER BOTHERED
RATE HOW BOTHERED YOU ARE BY EFFECTS OF YOUR SKIN PROBLEMS ON YOUR ACTIVITIES (EG, GOING OUT, ACCOMPLISHING WHAT YOU WANT, WORK ACTIVITIES OR YOUR RELATIONSHIPS WITH OTHERS): 0 - NEVER BOTHERED
WHAT SINGLE SKIN CONDITION LISTED BELOW IS THE PATIENT ANSWERING THE QUALITY-OF-LIFE ASSESSMENT QUESTIONS ABOUT: NONE OF THE ABOVE
RATE HOW BOTHERED YOU ARE BY EFFECTS OF YOUR SKIN PROBLEMS ON YOUR ACTIVITIES (EG, GOING OUT, ACCOMPLISHING WHAT YOU WANT, WORK ACTIVITIES OR YOUR RELATIONSHIPS WITH OTHERS): 0 - NEVER BOTHERED
RATE HOW BOTHERED YOU ARE BY SYMPTOMS OF YOUR SKIN PROBLEM (EG, ITCHING, STINGING BURNING, HURTING OR SKIN IRRITATION): 0 - NEVER BOTHERED
ARE THERE EXCLUSIONS OR EXCEPTIONS FOR THE QUALITY OF LIFE ASSESSMENT: NO

## 2024-07-11 ASSESSMENT — PATIENT GLOBAL ASSESSMENT (PGA): WHAT IS THE PGA: PATIENT GLOBAL ASSESSMENT:  2 - MILD

## 2024-07-11 ASSESSMENT — DERMATOLOGY PATIENT ASSESSMENT
ARE YOU AN ORGAN TRANSPLANT RECIPIENT: NO
HAVE YOU HAD OR DO YOU HAVE A STAPH INFECTION: NO
DO YOU HAVE IRREGULAR MENSTRUAL CYCLES: NO
ARE YOU TRYING TO GET PREGNANT: NO
DO YOU USE SUNSCREEN: OCCASIONALLY
DO YOU USE A TANNING BED: NO
HAVE YOU HAD OR DO YOU HAVE VASCULAR DISEASE: NO
DO YOU HAVE ANY NEW OR CHANGING LESIONS: NO
ARE YOU ON BIRTH CONTROL: NO

## 2024-07-11 ASSESSMENT — ITCH NUMERIC RATING SCALE: HOW SEVERE IS YOUR ITCHING?: 0

## 2024-07-11 NOTE — PROGRESS NOTES
Operative Note    Date of Surgery:  7/11/2024  Surgeon:  Mara Benton MD    Biopsy 5/9/24     B. SKIN, LEFT UPPER ARM - POSTERIOR, SHAVE BIOPSY:  SQUAMOUS CELL CARCINOMA IN SITU, EXTENDING TO A PERIPHERAL MARGIN IN THESE PLANES OF SECTION.    Assessment/Plan   Squamous cell carcinoma in situ  Left Upper Arm - Posterior    Destr of lesion  Complexity: simple    Destruction method: electrodesiccation and curettage    Informed consent: discussed and consent obtained    Timeout:  patient name, date of birth, surgical site, and procedure verified  Procedure prep:  Patient was prepped and draped  Anesthesia: the lesion was anesthetized in a standard fashion    Anesthetic:  1% lidocaine w/ epinephrine 1-100,000 local infiltration  Curettage performed in three different directions: Yes    Electrodesiccation performed over the curetted area: Yes    Curettage cycles:  3  Lesion length (cm):  1  Lesion width (cm):  1.8  Margin per side (cm):  0.4  Final wound size (cm):  2.6  Hemostasis achieved with:  electrodesiccation  Outcome: patient tolerated procedure well with no complications    Post-procedure details: sterile dressing applied and wound care instructions given    Dressing type: petrolatum and bandage      Staff Communication: Dermatology Local Anesthesia: Lidocaine 0.5% with Epinephrine 1;200,000 - Amount: up to 4 ml          For the actinic keratosis that was biopsied on her left cheek, they defer treatment today. We will re-eval at her scheduled Full Skin Exam in Nov and treat then if needed    A. SKIN, LEFT PAROTID AREA, SHAVE BIOPSY:  ACTINIC KERATOSIS, EXTENDING TO THE DEEP (VIA ADNEXA) AND THE PERIPHERAL MARGINS IN THESE PLANES OF SECTION.

## 2024-07-17 ENCOUNTER — HOSPITAL ENCOUNTER (OUTPATIENT)
Dept: NEUROLOGY | Facility: HOSPITAL | Age: 84
Discharge: HOME | End: 2024-07-17
Payer: MEDICARE

## 2024-07-17 ENCOUNTER — HOSPITAL ENCOUNTER (OUTPATIENT)
Dept: RADIOLOGY | Facility: HOSPITAL | Age: 84
Discharge: HOME | End: 2024-07-17
Payer: MEDICARE

## 2024-07-17 DIAGNOSIS — G31.09 FRONTOTEMPORAL DEMENTIA WITH BEHAVIORAL DISTURBANCE (MULTI): ICD-10-CM

## 2024-07-17 DIAGNOSIS — F02.818 FRONTOTEMPORAL DEMENTIA WITH BEHAVIORAL DISTURBANCE (MULTI): ICD-10-CM

## 2024-07-17 PROCEDURE — 95819 EEG AWAKE AND ASLEEP: CPT | Performed by: PSYCHIATRY & NEUROLOGY

## 2024-07-17 PROCEDURE — 70551 MRI BRAIN STEM W/O DYE: CPT

## 2024-07-17 PROCEDURE — 70551 MRI BRAIN STEM W/O DYE: CPT | Performed by: RADIOLOGY

## 2024-07-17 PROCEDURE — 95819 EEG AWAKE AND ASLEEP: CPT

## 2024-07-19 DIAGNOSIS — C50.011 MALIGNANT NEOPLASM INVOLVING BOTH NIPPLE AND AREOLA OF RIGHT BREAST IN FEMALE, UNSPECIFIED ESTROGEN RECEPTOR STATUS (MULTI): Primary | ICD-10-CM

## 2024-07-19 DIAGNOSIS — C50.912 MALIGNANT NEOPLASM OF LEFT FEMALE BREAST, UNSPECIFIED ESTROGEN RECEPTOR STATUS, UNSPECIFIED SITE OF BREAST (MULTI): ICD-10-CM

## 2024-07-19 DIAGNOSIS — J35.8 TONSILLAR MASS: ICD-10-CM

## 2024-07-22 ENCOUNTER — LAB (OUTPATIENT)
Dept: LAB | Facility: LAB | Age: 84
End: 2024-07-22
Payer: MEDICARE

## 2024-07-22 DIAGNOSIS — C50.912 MALIGNANT NEOPLASM OF LEFT FEMALE BREAST, UNSPECIFIED ESTROGEN RECEPTOR STATUS, UNSPECIFIED SITE OF BREAST (MULTI): ICD-10-CM

## 2024-07-22 DIAGNOSIS — J35.8 TONSILLAR MASS: ICD-10-CM

## 2024-07-22 LAB
ALBUMIN SERPL BCP-MCNC: 4.3 G/DL (ref 3.4–5)
ALP SERPL-CCNC: 136 U/L (ref 33–136)
ALT SERPL W P-5'-P-CCNC: 29 U/L (ref 7–45)
ANION GAP SERPL CALC-SCNC: 13 MMOL/L (ref 10–20)
AST SERPL W P-5'-P-CCNC: 26 U/L (ref 9–39)
BILIRUB SERPL-MCNC: 0.4 MG/DL (ref 0–1.2)
BUN SERPL-MCNC: 17 MG/DL (ref 6–23)
CALCIUM SERPL-MCNC: 10.8 MG/DL (ref 8.6–10.6)
CHLORIDE SERPL-SCNC: 105 MMOL/L (ref 98–107)
CO2 SERPL-SCNC: 29 MMOL/L (ref 21–32)
CREAT SERPL-MCNC: 0.99 MG/DL (ref 0.5–1.05)
EGFRCR SERPLBLD CKD-EPI 2021: 57 ML/MIN/1.73M*2
GLUCOSE SERPL-MCNC: 107 MG/DL (ref 74–99)
POTASSIUM SERPL-SCNC: 4.2 MMOL/L (ref 3.5–5.3)
PROT SERPL-MCNC: 7.3 G/DL (ref 6.4–8.2)
SODIUM SERPL-SCNC: 143 MMOL/L (ref 136–145)

## 2024-07-22 PROCEDURE — 80053 COMPREHEN METABOLIC PANEL: CPT

## 2024-07-22 PROCEDURE — 36415 COLL VENOUS BLD VENIPUNCTURE: CPT

## 2024-08-07 ENCOUNTER — HOSPITAL ENCOUNTER (OUTPATIENT)
Dept: RADIOLOGY | Facility: CLINIC | Age: 84
Discharge: HOME | End: 2024-08-07
Payer: MEDICARE

## 2024-08-07 DIAGNOSIS — J35.8 TONSILLAR MASS: ICD-10-CM

## 2024-08-07 DIAGNOSIS — C50.912 MALIGNANT NEOPLASM OF LEFT FEMALE BREAST, UNSPECIFIED ESTROGEN RECEPTOR STATUS, UNSPECIFIED SITE OF BREAST (MULTI): ICD-10-CM

## 2024-08-07 PROCEDURE — 70491 CT SOFT TISSUE NECK W/DYE: CPT

## 2024-08-07 PROCEDURE — 70491 CT SOFT TISSUE NECK W/DYE: CPT | Performed by: RADIOLOGY

## 2024-08-07 PROCEDURE — 2550000001 HC RX 255 CONTRASTS: Performed by: PSYCHIATRY & NEUROLOGY

## 2024-09-26 ENCOUNTER — OFFICE VISIT (OUTPATIENT)
Dept: NEUROLOGY | Facility: CLINIC | Age: 84
End: 2024-09-26
Payer: MEDICARE

## 2024-09-26 VITALS
HEART RATE: 96 BPM | SYSTOLIC BLOOD PRESSURE: 140 MMHG | RESPIRATION RATE: 16 BRPM | TEMPERATURE: 96 F | DIASTOLIC BLOOD PRESSURE: 101 MMHG | BODY MASS INDEX: 26.19 KG/M2 | WEIGHT: 143.2 LBS

## 2024-09-26 DIAGNOSIS — E07.89 THYROID MASS OF UNCLEAR ETIOLOGY: ICD-10-CM

## 2024-09-26 DIAGNOSIS — G31.09 FRONTOTEMPORAL DEMENTIA WITH BEHAVIORAL DISTURBANCE (MULTI): Primary | ICD-10-CM

## 2024-09-26 DIAGNOSIS — F02.818 FRONTOTEMPORAL DEMENTIA WITH BEHAVIORAL DISTURBANCE (MULTI): Primary | ICD-10-CM

## 2024-09-26 PROCEDURE — 3077F SYST BP >= 140 MM HG: CPT | Performed by: PSYCHIATRY & NEUROLOGY

## 2024-09-26 PROCEDURE — 1159F MED LIST DOCD IN RCRD: CPT | Performed by: PSYCHIATRY & NEUROLOGY

## 2024-09-26 PROCEDURE — 1036F TOBACCO NON-USER: CPT | Performed by: PSYCHIATRY & NEUROLOGY

## 2024-09-26 PROCEDURE — 1126F AMNT PAIN NOTED NONE PRSNT: CPT | Performed by: PSYCHIATRY & NEUROLOGY

## 2024-09-26 PROCEDURE — 99215 OFFICE O/P EST HI 40 MIN: CPT | Performed by: PSYCHIATRY & NEUROLOGY

## 2024-09-26 PROCEDURE — 3080F DIAST BP >= 90 MM HG: CPT | Performed by: PSYCHIATRY & NEUROLOGY

## 2024-09-26 ASSESSMENT — PATIENT HEALTH QUESTIONNAIRE - PHQ9
SUM OF ALL RESPONSES TO PHQ9 QUESTIONS 1 AND 2: 1
2. FEELING DOWN, DEPRESSED OR HOPELESS: NOT AT ALL
1. LITTLE INTEREST OR PLEASURE IN DOING THINGS: SEVERAL DAYS
10. IF YOU CHECKED OFF ANY PROBLEMS, HOW DIFFICULT HAVE THESE PROBLEMS MADE IT FOR YOU TO DO YOUR WORK, TAKE CARE OF THINGS AT HOME, OR GET ALONG WITH OTHER PEOPLE: SOMEWHAT DIFFICULT

## 2024-09-26 ASSESSMENT — ENCOUNTER SYMPTOMS
OCCASIONAL FEELINGS OF UNSTEADINESS: 0
LOSS OF SENSATION IN FEET: 0

## 2024-09-26 ASSESSMENT — PAIN SCALES - GENERAL: PAINLEVEL: 0-NO PAIN

## 2024-09-26 NOTE — PROGRESS NOTES
[place], [place]      Department of Neurology  [facility]    FU Consultation    PCP: Dr. Lima Grant       2024  Re: Marli Canales    :  1940  MRN [id number]    CC:  83yo woman referred for evaluation of cognitive impairment. Info from pt, husb & EMR.  History of Present Illness:   : Lifelong highly athletic active competitor in major sports (gold, tennis, skiing) gradually declined. 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 Dr. Suresh has been managing the pt's cognitive/behavioral decline.  :  Pt's BPs become uncontrollably high and verapamil then several other meds with irregular control.  Pt started to get lost in driving, sometimes with substantial excursions.  Fainted on a hot day, ambud to hosp leading to reducing the previously augmented antiHTN regimen.  Dr Suresh suggested driving cessation that was subsequently adopted by the pt & .   : Emergence of cognitive changes, response that didn't make sense in her [employer] group meetings.  Forgetting simple activities at home, worsening in to .   assumed responsibilities for home, kitchen, upkeep and all driving.   [id number]:  Repeated Urn&BM in her pants, at Zoroastrian and other activities.  Recently: Occasionally, she does things that “don't make sense,” asked for cell phone and repeatedly retrieved other objects; with variety of object misidentifications.  Had been very social, withdrew, then recently re-engaged with socially inapprop convers that prompted her withdrawal.  Overall, gradually cumulative functional decline, perseveration regarding things she finds to be humorous.   [id number]: Fecal continence subsided, urinary incont persist.  Loses place in her bible reading.  Sleep and appatite are good.  Eats one things at a time on her plate.   Forgets tasks, watches TV.  In heat she become hypotensive and faints, prev evald and new Rxs.  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)  S/P: Lt cheek bx, Rt mastxàlymphedema  lumpx,  Implants: none  Trauma: none  FHx: Dad d80s hrt dis;  Mom inpt psychiatric pt d46 suicide, sis d47 breast CA, bro estranged  Kids: pt has 4 children ( has 9 children)  PSHx:   in [facility]  did college classes , 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;  Driving: stopped Sleep: 10pm to morning  EtOH: wine w/ dinner, less    no longer stopped       ROS: w/o Skin-Skel nl     Cardio-pulm 35yrs labile HTN   U/L-GI nl     Neuro:  w/o LBP nl  w/o neck pain wnl w/o HAs wnl   h/o gait instab dxd TIA   Exam: EA=981/101, HR=80, RR=18, rc=189  General:   Lungs w/ clear,    freq ectopy w/o MRG, full carotids w/o T/B        Abdo soft +BS no bruit       Extrems w/o depend edema  Neuro  MS:  flat affect, NOT SAD, responsive to my social expression   not anxious or sad     w/ masked but preserved range  no hallucins, delusions, or agitation   CN:  FEOMs w/o ptosis Face: Symm S&E   Motor: strong proport to bulk  w/o UE drift   w/ steady  bilat   tone: wnl symm          MSRs: +2 throughout   no Price's/grasp/palmoment   Sensation:  symm LT, cold, and vib face > hands > ankles    -Romberg,  little sway   Coordin: FT fast w/ reg pace bilat     FNF accurate & symm to fixed target      Gait: nl pace, stride, armswing  turning in 3.0 steps,   stable tandem forward   Cognitive Exam   Handedness:   RH Language:  recept: ans & follows wnl    express: scant 1-2 word  phrases    no full sents,  ( sts pt recovering prev talkativeness)      word-find w/o pauses    w/o paraphasias      repeat:  complete w nl pace    name:  body parts=3/3   objects=5/5     Praxis:  intrans: hand shapes to model=3/3    transitive: pen twirling  Rt wnl Lt fails    Percept:  visual: traffic signs=4/4,  imbedded objs (w/o clutter)=0/5     Memory: visspat: room topology (door, pc, prev room) =3/3    Exec:  Color-name Stroop: names=0/9 errors   colors= 0/9 errors    Prev Testing MoCA=20/30 c/w mod impairment,     AD8=8/8 c/w chronic arslan mental illness  Labs (to 7-):  wbc=7.8,    hct=45.7,    vek=822   HStrop=18, 18  zrx=207,   bun/crt=17/0.99,     GFR=57, Ca=10.8   GvoXufe=285, AST=26, ALT=29  TBilli=0.4  TSH=2.74       DermPath (5-9-2024): Lt parotid: actinic keratosis, LtUpArm:squam carcinoma  EKG (7-): NSR=81, LRpD=494 (PDpTcu=817)  Abnls: 1stAVB, incomp RBBB,    Carotid dup (9-4-2019): Minimal bilateral carotid plaque; no stenosis greater than 50%   Head CT (8-): 1. No acute intracran abnl.  2. Stable sev supratent sml vsl dis.   Brain MRI (7-): Sml focus of mild DWI hyper in deep WM of sup left frontal lobe w/ T2/FLAIR hyperintens and relative normalization on ADC imaging, likely representing a subacute to chronic ischemic insult.  Additionally, there is mild DWI hyperintens in Rt parietal lobe WM surrounding a remote lacunar infarct, could reflect additional subacute ischemic change..no definite acute infarct identified. There is severe, confluent as well as patchy, T2/FLAIR white matter hyperintensity involving the bilateral cerebral hemispheres as well as the central isreal, of chronic small vessel ischemia.  Multiple remote bilateral basal ganglia lacunes. No recent hemorrhage. No mass effect or shift. On GRE sequence there are remote microhemorrhages involving the Lt parietal lobe, Rt  temporo-occipital junction, and ventral isreal.  CSF Spaces normal for age with mild-to-moderate generalized atrophy. No lobar or hippo atrophy appreciated.   Brain MRI (7-):  Radiologist contact per Judie Vigil: No acute infarct, recent hemorrhage, or intracranial mass effect.  Possible tiny subacute ischemic insults in bilateral cerebral hemispheres. Severe chronic small vessel ischemia with remote bilateral basal ganglia lacunes.  Mild-to-moderate genl brain atrophy. No disproport lobar atrophy of frontotemporal dementia.  There is nonspecific apparent asymmetry involving the region of the left palatine tonsil, incompletely image/assessed. Consider dedicated CT neck with contrast for further evaluation.   CT Neck (8-7-2024): 1. 2.8 cm asym enlargement of Lt palatine tonsil.  Assessment for extension of lesion into adjacent spaces is severely limited secondary to streak artifact from dental amalgam. No evidence of osseous involvement. No cervical lymphadenopathy by size criteria.  Findings may represent infectious/inflammatory process though a oropharyngeal neoplasm can not be excluded. Recommend correlation with direct inspection.  2. Heterogeneously enhancing  2.7 cm right thyroid nodule. Incidental Finding:  There are few small hypoattenuating nodules measuring equal to or greater than 1.5 cm in the thyroid gland.  (**-YCF-**)  Instructions:  Further evaluation with nonemergent thyroid ultrasound. (Managing Incidental Thyroid Nodules Detected on Imaging: White Paper of the ACR Incidental Thyroid Findings Committee. Ijeoma Wilson et  al. Journal of the AmCollRadiol,Vol 12, Issue 2, 143 - 150.)   (Ordered CJ 9-)  A&P: History: Lifelong outstanding athlete, then breast CA chemoàcognitive decline.   Genl exam w/ crowded ocular discs and freq cardiac ectopy.  Neuro exam w/ distal LE decr in    sensation.  Cognitive exam somewhat disorganized/agrammatical speech, but makes good effort, poor Lt pen  twirl (transitive dyspraxia), poor Luria motor sequence learning, and poor imbedded figures visual perception.  Testing: MoCA=20/30 c/w moderate impairment, AD8=8/8 reflecting high caregiver burden.  Labs:  GFR=37 and prev Ryzwir=142. Derm path w/ LtArm squam carcinoma, EKG w/ WPmVgj=689/incRBBB.  Head CT w/ severe supratentoral small vessel ischemic dis.  Interpret: Pt w/o breast CA chemo then labile HTN and progressive cognitive decline with CT c/w cerebral small vessel dis. Plan: Memantine stopped b/o low GFR.  Refered for Thyroid US re Rt lob mass >1.5cm.  Referred to Stroke Neurol ? best tx ? ASA+. F/U:  We discussed all of these matters in full.  I will arrange brain MRI to better evaluate cerebrovascular compromise with EEG to r/o focal cortical dysfunction possibly related to freq lapses of speech.  RTC in 3 months.   Thank you for allowing me to participate in your care.   Sincerely yours, Silvino Millan M.D., Ph.D.

## 2024-10-10 ENCOUNTER — HOSPITAL ENCOUNTER (OUTPATIENT)
Dept: RADIOLOGY | Facility: HOSPITAL | Age: 84
Discharge: HOME | End: 2024-10-10
Payer: MEDICARE

## 2024-10-10 DIAGNOSIS — E07.89 THYROID MASS OF UNCLEAR ETIOLOGY: ICD-10-CM

## 2024-10-10 PROCEDURE — 76536 US EXAM OF HEAD AND NECK: CPT

## 2024-11-14 ENCOUNTER — LAB (OUTPATIENT)
Dept: LAB | Facility: LAB | Age: 84
End: 2024-11-14
Payer: MEDICARE

## 2024-11-14 DIAGNOSIS — J35.8 TONSILLAR MASS: ICD-10-CM

## 2024-11-14 DIAGNOSIS — R73.9 BORDERLINE HYPERGLYCEMIA: ICD-10-CM

## 2024-11-14 DIAGNOSIS — R79.89 ABNORMAL TSH: ICD-10-CM

## 2024-11-14 DIAGNOSIS — R41.0 DISORIENTATION: ICD-10-CM

## 2024-11-14 LAB
ALBUMIN SERPL BCP-MCNC: 4.3 G/DL (ref 3.4–5)
ALP SERPL-CCNC: 132 U/L (ref 33–136)
ALT SERPL W P-5'-P-CCNC: 40 U/L (ref 7–45)
ANION GAP SERPL CALC-SCNC: 14 MMOL/L (ref 10–20)
AST SERPL W P-5'-P-CCNC: 30 U/L (ref 9–39)
BASOPHILS # BLD AUTO: 0.04 X10*3/UL (ref 0–0.1)
BASOPHILS NFR BLD AUTO: 0.6 %
BILIRUB SERPL-MCNC: 0.6 MG/DL (ref 0–1.2)
BUN SERPL-MCNC: 19 MG/DL (ref 6–23)
CALCIUM SERPL-MCNC: 11.2 MG/DL (ref 8.6–10.6)
CHLORIDE SERPL-SCNC: 103 MMOL/L (ref 98–107)
CO2 SERPL-SCNC: 28 MMOL/L (ref 21–32)
CREAT SERPL-MCNC: 0.97 MG/DL (ref 0.5–1.05)
EGFRCR SERPLBLD CKD-EPI 2021: 58 ML/MIN/1.73M*2
EOSINOPHIL # BLD AUTO: 0.04 X10*3/UL (ref 0–0.4)
EOSINOPHIL NFR BLD AUTO: 0.6 %
ERYTHROCYTE [DISTWIDTH] IN BLOOD BY AUTOMATED COUNT: 13.5 % (ref 11.5–14.5)
ERYTHROCYTE [SEDIMENTATION RATE] IN BLOOD BY WESTERGREN METHOD: 15 MM/H (ref 0–30)
EST. AVERAGE GLUCOSE BLD GHB EST-MCNC: 114 MG/DL
GLUCOSE SERPL-MCNC: 113 MG/DL (ref 74–99)
HBA1C MFR BLD: 5.6 %
HCT VFR BLD AUTO: 47.1 % (ref 36–46)
HGB BLD-MCNC: 15 G/DL (ref 12–16)
IMM GRANULOCYTES # BLD AUTO: 0.01 X10*3/UL (ref 0–0.5)
IMM GRANULOCYTES NFR BLD AUTO: 0.2 % (ref 0–0.9)
LYMPHOCYTES # BLD AUTO: 1.47 X10*3/UL (ref 0.8–3)
LYMPHOCYTES NFR BLD AUTO: 23 %
MCH RBC QN AUTO: 30.5 PG (ref 26–34)
MCHC RBC AUTO-ENTMCNC: 31.8 G/DL (ref 32–36)
MCV RBC AUTO: 96 FL (ref 80–100)
MONOCYTES # BLD AUTO: 0.36 X10*3/UL (ref 0.05–0.8)
MONOCYTES NFR BLD AUTO: 5.6 %
NEUTROPHILS # BLD AUTO: 4.47 X10*3/UL (ref 1.6–5.5)
NEUTROPHILS NFR BLD AUTO: 70 %
NRBC BLD-RTO: 0 /100 WBCS (ref 0–0)
PLATELET # BLD AUTO: 268 X10*3/UL (ref 150–450)
POTASSIUM SERPL-SCNC: 4.1 MMOL/L (ref 3.5–5.3)
PROT SERPL-MCNC: 7.3 G/DL (ref 6.4–8.2)
RBC # BLD AUTO: 4.91 X10*6/UL (ref 4–5.2)
SODIUM SERPL-SCNC: 141 MMOL/L (ref 136–145)
TSH SERPL-ACNC: 2.21 MIU/L (ref 0.44–3.98)
WBC # BLD AUTO: 6.4 X10*3/UL (ref 4.4–11.3)

## 2024-11-14 PROCEDURE — 80053 COMPREHEN METABOLIC PANEL: CPT

## 2024-11-14 PROCEDURE — 83036 HEMOGLOBIN GLYCOSYLATED A1C: CPT

## 2024-11-14 PROCEDURE — 84443 ASSAY THYROID STIM HORMONE: CPT

## 2024-11-14 PROCEDURE — 36415 COLL VENOUS BLD VENIPUNCTURE: CPT

## 2024-11-14 PROCEDURE — 85025 COMPLETE CBC W/AUTO DIFF WBC: CPT

## 2024-11-14 PROCEDURE — 85652 RBC SED RATE AUTOMATED: CPT

## 2024-11-19 ENCOUNTER — APPOINTMENT (OUTPATIENT)
Dept: DERMATOLOGY | Facility: CLINIC | Age: 84
End: 2024-11-19
Payer: MEDICARE

## 2024-12-19 ENCOUNTER — APPOINTMENT (OUTPATIENT)
Dept: NEUROLOGY | Facility: CLINIC | Age: 84
End: 2024-12-19
Payer: MEDICARE

## 2025-01-01 ENCOUNTER — HOSPITAL ENCOUNTER (INPATIENT)
Facility: HOSPITAL | Age: 85
LOS: 4 days | End: 2025-01-31
Attending: NEUROLOGICAL SURGERY | Admitting: NEUROLOGICAL SURGERY
Payer: COMMERCIAL

## 2025-01-01 ENCOUNTER — APPOINTMENT (OUTPATIENT)
Dept: RADIOLOGY | Facility: HOSPITAL | Age: 85
DRG: 947 | End: 2025-01-01
Payer: COMMERCIAL

## 2025-01-01 ENCOUNTER — APPOINTMENT (OUTPATIENT)
Dept: CARDIOLOGY | Facility: HOSPITAL | Age: 85
End: 2025-01-01
Payer: COMMERCIAL

## 2025-01-01 ENCOUNTER — APPOINTMENT (OUTPATIENT)
Dept: RADIOLOGY | Facility: HOSPITAL | Age: 85
End: 2025-01-01
Payer: COMMERCIAL

## 2025-01-01 ENCOUNTER — APPOINTMENT (OUTPATIENT)
Dept: CARDIOLOGY | Facility: HOSPITAL | Age: 85
DRG: 947 | End: 2025-01-01
Payer: COMMERCIAL

## 2025-01-01 VITALS
RESPIRATION RATE: 14 BRPM | TEMPERATURE: 97.2 F | WEIGHT: 148.59 LBS | OXYGEN SATURATION: 95 % | BODY MASS INDEX: 27.34 KG/M2 | HEIGHT: 62 IN | DIASTOLIC BLOOD PRESSURE: 69 MMHG | HEART RATE: 88 BPM | SYSTOLIC BLOOD PRESSURE: 125 MMHG

## 2025-01-01 DIAGNOSIS — I67.89 OTHER CEREBROVASCULAR DISEASE: ICD-10-CM

## 2025-01-01 DIAGNOSIS — R53.1 LEFT-SIDED WEAKNESS: ICD-10-CM

## 2025-01-01 DIAGNOSIS — I61.9 INTRACEREBRAL HEMORRHAGE, NONTRAUMATIC (MULTI): Primary | ICD-10-CM

## 2025-01-01 LAB
ALBUMIN SERPL BCP-MCNC: 2.7 G/DL (ref 3.4–5)
ALBUMIN SERPL BCP-MCNC: 3 G/DL (ref 3.4–5)
ALBUMIN SERPL BCP-MCNC: 3.3 G/DL (ref 3.4–5)
ALBUMIN SERPL BCP-MCNC: 3.5 G/DL (ref 3.4–5)
ANION GAP SERPL CALC-SCNC: 13 MMOL/L (ref 10–20)
ANION GAP SERPL CALC-SCNC: 14 MMOL/L (ref 10–20)
ANION GAP SERPL CALC-SCNC: 15 MMOL/L (ref 10–20)
ANION GAP SERPL CALC-SCNC: 16 MMOL/L (ref 10–20)
AORTIC VALVE PEAK VELOCITY: 1.17 M/S
AORTIC VALVE PEAK VELOCITY: 1.17 M/S
APTT PPP: 21 SECONDS (ref 27–38)
APTT PPP: 27 SECONDS (ref 27–38)
APTT PPP: 36 SECONDS (ref 27–38)
APTT PPP: 42 SECONDS (ref 27–38)
AV PEAK GRADIENT: 5 MMHG
AV PEAK GRADIENT: 5 MMHG
AVA (PEAK VEL): 2.39 CM2
AVA (PEAK VEL): 2.39 CM2
BNP SERPL-MCNC: 58 PG/ML (ref 0–99)
BODY SURFACE AREA: 1.7 M2
BUN SERPL-MCNC: 25 MG/DL (ref 6–23)
BUN SERPL-MCNC: 28 MG/DL (ref 6–23)
BUN SERPL-MCNC: 38 MG/DL (ref 6–23)
BUN SERPL-MCNC: 39 MG/DL (ref 6–23)
CA-I BLD-SCNC: 1.15 MMOL/L (ref 1.1–1.33)
CA-I BLD-SCNC: 1.3 MMOL/L (ref 1.1–1.33)
CALCIUM SERPL-MCNC: 10.1 MG/DL (ref 8.6–10.6)
CALCIUM SERPL-MCNC: 9.1 MG/DL (ref 8.6–10.6)
CALCIUM SERPL-MCNC: 9.7 MG/DL (ref 8.6–10.6)
CALCIUM SERPL-MCNC: 9.9 MG/DL (ref 8.6–10.6)
CARDIAC TROPONIN I PNL SERPL HS: 41 NG/L (ref 0–34)
CHLORIDE SERPL-SCNC: 111 MMOL/L (ref 98–107)
CHLORIDE SERPL-SCNC: 113 MMOL/L (ref 98–107)
CHLORIDE SERPL-SCNC: 113 MMOL/L (ref 98–107)
CHLORIDE SERPL-SCNC: 114 MMOL/L (ref 98–107)
CHOLEST SERPL-MCNC: 165 MG/DL (ref 0–199)
CHOLESTEROL/HDL RATIO: 3.9
CO2 SERPL-SCNC: 22 MMOL/L (ref 21–32)
CO2 SERPL-SCNC: 22 MMOL/L (ref 21–32)
CO2 SERPL-SCNC: 23 MMOL/L (ref 21–32)
CO2 SERPL-SCNC: 23 MMOL/L (ref 21–32)
CREAT SERPL-MCNC: 0.98 MG/DL (ref 0.5–1.05)
CREAT SERPL-MCNC: 1.02 MG/DL (ref 0.5–1.05)
CREAT SERPL-MCNC: 1.27 MG/DL (ref 0.5–1.05)
CREAT SERPL-MCNC: 1.47 MG/DL (ref 0.5–1.05)
EGFRCR SERPLBLD CKD-EPI 2021: 35 ML/MIN/1.73M*2
EGFRCR SERPLBLD CKD-EPI 2021: 42 ML/MIN/1.73M*2
EGFRCR SERPLBLD CKD-EPI 2021: 55 ML/MIN/1.73M*2
EGFRCR SERPLBLD CKD-EPI 2021: 57 ML/MIN/1.73M*2
EJECTION FRACTION APICAL 4 CHAMBER: 63
EJECTION FRACTION APICAL 4 CHAMBER: 63
EJECTION FRACTION: 69 %
EJECTION FRACTION: 69 %
ERYTHROCYTE [DISTWIDTH] IN BLOOD BY AUTOMATED COUNT: 13.6 % (ref 11.5–14.5)
ERYTHROCYTE [DISTWIDTH] IN BLOOD BY AUTOMATED COUNT: 13.9 % (ref 11.5–14.5)
ERYTHROCYTE [DISTWIDTH] IN BLOOD BY AUTOMATED COUNT: 14.3 % (ref 11.5–14.5)
EST. AVERAGE GLUCOSE BLD GHB EST-MCNC: 108 MG/DL
GLUCOSE BLD MANUAL STRIP-MCNC: 101 MG/DL (ref 74–99)
GLUCOSE BLD MANUAL STRIP-MCNC: 136 MG/DL (ref 74–99)
GLUCOSE BLD MANUAL STRIP-MCNC: 81 MG/DL (ref 74–99)
GLUCOSE BLD MANUAL STRIP-MCNC: 84 MG/DL (ref 74–99)
GLUCOSE BLD MANUAL STRIP-MCNC: 85 MG/DL (ref 74–99)
GLUCOSE BLD MANUAL STRIP-MCNC: 87 MG/DL (ref 74–99)
GLUCOSE BLD MANUAL STRIP-MCNC: 91 MG/DL (ref 74–99)
GLUCOSE BLD MANUAL STRIP-MCNC: 94 MG/DL (ref 74–99)
GLUCOSE BLD MANUAL STRIP-MCNC: 96 MG/DL (ref 74–99)
GLUCOSE SERPL-MCNC: 144 MG/DL (ref 74–99)
GLUCOSE SERPL-MCNC: 90 MG/DL (ref 74–99)
GLUCOSE SERPL-MCNC: 91 MG/DL (ref 74–99)
GLUCOSE SERPL-MCNC: 96 MG/DL (ref 74–99)
HBA1C MFR BLD: 5.4 %
HCT VFR BLD AUTO: 32 % (ref 36–46)
HCT VFR BLD AUTO: 35.2 % (ref 36–46)
HCT VFR BLD AUTO: 39 % (ref 36–46)
HDLC SERPL-MCNC: 42 MG/DL
HGB BLD-MCNC: 10.7 G/DL (ref 12–16)
HGB BLD-MCNC: 11.6 G/DL (ref 12–16)
HGB BLD-MCNC: 12.8 G/DL (ref 12–16)
INR PPP: 1.1 (ref 0.9–1.1)
INR PPP: 1.1 (ref 0.9–1.1)
INR PPP: 1.2 (ref 0.9–1.1)
INR PPP: 1.4 (ref 0.9–1.1)
LDLC SERPL CALC-MCNC: 98 MG/DL
LEFT ATRIUM VOLUME AREA LENGTH INDEX BSA: 17.9 ML/M2
LEFT ATRIUM VOLUME AREA LENGTH INDEX BSA: 17.9 ML/M2
LEFT VENTRICLE INTERNAL DIMENSION DIASTOLE: 3.9 CM (ref 3.5–6)
LEFT VENTRICLE INTERNAL DIMENSION DIASTOLE: 3.9 CM (ref 3.5–6)
LEFT VENTRICULAR OUTFLOW TRACT DIAMETER: 1.9 CM
LEFT VENTRICULAR OUTFLOW TRACT DIAMETER: 1.9 CM
MAGNESIUM SERPL-MCNC: 2.18 MG/DL (ref 1.6–2.4)
MAGNESIUM SERPL-MCNC: 2.22 MG/DL (ref 1.6–2.4)
MAGNESIUM SERPL-MCNC: 2.23 MG/DL (ref 1.6–2.4)
MAGNESIUM SERPL-MCNC: 2.24 MG/DL (ref 1.6–2.4)
MCH RBC QN AUTO: 30.9 PG (ref 26–34)
MCH RBC QN AUTO: 31.4 PG (ref 26–34)
MCH RBC QN AUTO: 31.5 PG (ref 26–34)
MCHC RBC AUTO-ENTMCNC: 32.8 G/DL (ref 32–36)
MCHC RBC AUTO-ENTMCNC: 33 G/DL (ref 32–36)
MCHC RBC AUTO-ENTMCNC: 33.4 G/DL (ref 32–36)
MCV RBC AUTO: 94 FL (ref 80–100)
MCV RBC AUTO: 94 FL (ref 80–100)
MCV RBC AUTO: 96 FL (ref 80–100)
MITRAL VALVE E/A RATIO: 0.95
MITRAL VALVE E/A RATIO: 0.95
NON HDL CHOLESTEROL: 123 MG/DL (ref 0–149)
NRBC BLD-RTO: 0 /100 WBCS (ref 0–0)
PHOSPHATE SERPL-MCNC: 3.1 MG/DL (ref 2.5–4.9)
PHOSPHATE SERPL-MCNC: 3.1 MG/DL (ref 2.5–4.9)
PHOSPHATE SERPL-MCNC: 3.6 MG/DL (ref 2.5–4.9)
PHOSPHATE SERPL-MCNC: 3.8 MG/DL (ref 2.5–4.9)
PLATELET # BLD AUTO: 183 X10*3/UL (ref 150–450)
PLATELET # BLD AUTO: 197 X10*3/UL (ref 150–450)
PLATELET # BLD AUTO: 213 X10*3/UL (ref 150–450)
POTASSIUM SERPL-SCNC: 3.6 MMOL/L (ref 3.5–5.3)
POTASSIUM SERPL-SCNC: 3.7 MMOL/L (ref 3.5–5.3)
POTASSIUM SERPL-SCNC: 4.4 MMOL/L (ref 3.5–5.3)
POTASSIUM SERPL-SCNC: 4.7 MMOL/L (ref 3.5–5.3)
PROTHROMBIN TIME: 12.1 SECONDS (ref 9.8–12.8)
PROTHROMBIN TIME: 12.3 SECONDS (ref 9.8–12.8)
PROTHROMBIN TIME: 13.6 SECONDS (ref 9.8–12.8)
PROTHROMBIN TIME: 15.4 SECONDS (ref 9.8–12.8)
RBC # BLD AUTO: 3.4 X10*6/UL (ref 4–5.2)
RBC # BLD AUTO: 3.75 X10*6/UL (ref 4–5.2)
RBC # BLD AUTO: 4.07 X10*6/UL (ref 4–5.2)
RIGHT VENTRICLE FREE WALL PEAK S': 11.5 CM/S
RIGHT VENTRICLE FREE WALL PEAK S': 11.5 CM/S
RIGHT VENTRICLE PEAK SYSTOLIC PRESSURE: 23.1 MMHG
RIGHT VENTRICLE PEAK SYSTOLIC PRESSURE: 23.1 MMHG
SODIUM SERPL-SCNC: 144 MMOL/L (ref 136–145)
SODIUM SERPL-SCNC: 145 MMOL/L (ref 136–145)
SODIUM SERPL-SCNC: 146 MMOL/L (ref 136–145)
SODIUM SERPL-SCNC: 147 MMOL/L (ref 136–145)
TRICUSPID ANNULAR PLANE SYSTOLIC EXCURSION: 1.9 CM
TRICUSPID ANNULAR PLANE SYSTOLIC EXCURSION: 1.9 CM
TRIGL SERPL-MCNC: 125 MG/DL (ref 0–149)
VLDL: 25 MG/DL (ref 0–40)
WBC # BLD AUTO: 7.6 X10*3/UL (ref 4.4–11.3)
WBC # BLD AUTO: 7.8 X10*3/UL (ref 4.4–11.3)
WBC # BLD AUTO: 8.5 X10*3/UL (ref 4.4–11.3)

## 2025-01-01 PROCEDURE — 2500000001 HC RX 250 WO HCPCS SELF ADMINISTERED DRUGS (ALT 637 FOR MEDICARE OP)

## 2025-01-01 PROCEDURE — 2500000004 HC RX 250 GENERAL PHARMACY W/ HCPCS (ALT 636 FOR OP/ED)

## 2025-01-01 PROCEDURE — 36415 COLL VENOUS BLD VENIPUNCTURE: CPT

## 2025-01-01 PROCEDURE — 2500000002 HC RX 250 W HCPCS SELF ADMINISTERED DRUGS (ALT 637 FOR MEDICARE OP, ALT 636 FOR OP/ED)

## 2025-01-01 PROCEDURE — 94003 VENT MGMT INPAT SUBQ DAY: CPT

## 2025-01-01 PROCEDURE — 2500000005 HC RX 250 GENERAL PHARMACY W/O HCPCS

## 2025-01-01 PROCEDURE — 85730 THROMBOPLASTIN TIME PARTIAL: CPT

## 2025-01-01 PROCEDURE — 99291 CRITICAL CARE FIRST HOUR: CPT

## 2025-01-01 PROCEDURE — 85610 PROTHROMBIN TIME: CPT

## 2025-01-01 PROCEDURE — 80069 RENAL FUNCTION PANEL: CPT

## 2025-01-01 PROCEDURE — 83880 ASSAY OF NATRIURETIC PEPTIDE: CPT

## 2025-01-01 PROCEDURE — 99291 CRITICAL CARE FIRST HOUR: CPT | Performed by: NEUROLOGICAL SURGERY

## 2025-01-01 PROCEDURE — 83036 HEMOGLOBIN GLYCOSYLATED A1C: CPT

## 2025-01-01 PROCEDURE — 70450 CT HEAD/BRAIN W/O DYE: CPT

## 2025-01-01 PROCEDURE — 99223 1ST HOSP IP/OBS HIGH 75: CPT | Performed by: NEUROLOGICAL SURGERY

## 2025-01-01 PROCEDURE — 99291 CRITICAL CARE FIRST HOUR: CPT | Performed by: NURSE PRACTITIONER

## 2025-01-01 PROCEDURE — 85027 COMPLETE CBC AUTOMATED: CPT

## 2025-01-01 PROCEDURE — 2020000001 HC ICU ROOM DAILY

## 2025-01-01 PROCEDURE — 83735 ASSAY OF MAGNESIUM: CPT

## 2025-01-01 PROCEDURE — 99233 SBSQ HOSP IP/OBS HIGH 50: CPT

## 2025-01-01 PROCEDURE — 2500000004 HC RX 250 GENERAL PHARMACY W/ HCPCS (ALT 636 FOR OP/ED): Performed by: REGISTERED NURSE

## 2025-01-01 PROCEDURE — 74018 RADEX ABDOMEN 1 VIEW: CPT

## 2025-01-01 PROCEDURE — 71045 X-RAY EXAM CHEST 1 VIEW: CPT

## 2025-01-01 PROCEDURE — 82330 ASSAY OF CALCIUM: CPT

## 2025-01-01 PROCEDURE — 99223 1ST HOSP IP/OBS HIGH 75: CPT

## 2025-01-01 PROCEDURE — 93005 ELECTROCARDIOGRAM TRACING: CPT

## 2025-01-01 PROCEDURE — 84484 ASSAY OF TROPONIN QUANT: CPT

## 2025-01-01 PROCEDURE — 82947 ASSAY GLUCOSE BLOOD QUANT: CPT

## 2025-01-01 PROCEDURE — 70450 CT HEAD/BRAIN W/O DYE: CPT | Performed by: RADIOLOGY

## 2025-01-01 PROCEDURE — 2500000004 HC RX 250 GENERAL PHARMACY W/ HCPCS (ALT 636 FOR OP/ED): Mod: TB

## 2025-01-01 PROCEDURE — 70498 CT ANGIOGRAPHY NECK: CPT

## 2025-01-01 PROCEDURE — 2500000001 HC RX 250 WO HCPCS SELF ADMINISTERED DRUGS (ALT 637 FOR MEDICARE OP): Performed by: REGISTERED NURSE

## 2025-01-01 PROCEDURE — 71045 X-RAY EXAM CHEST 1 VIEW: CPT | Performed by: RADIOLOGY

## 2025-01-01 PROCEDURE — 94002 VENT MGMT INPAT INIT DAY: CPT

## 2025-01-01 PROCEDURE — XXE2X19 MEASUREMENT OF CARDIAC OUTPUT, COMPUTER-AIDED ASSESSMENT, NEW TECHNOLOGY GROUP 9: ICD-10-PCS

## 2025-01-01 PROCEDURE — 0932T N-INVS DET HRT FAIL AUG ECHO: CPT

## 2025-01-01 PROCEDURE — 80061 LIPID PANEL: CPT

## 2025-01-01 PROCEDURE — 83718 ASSAY OF LIPOPROTEIN: CPT

## 2025-01-01 PROCEDURE — 5A1955Z RESPIRATORY VENTILATION, GREATER THAN 96 CONSECUTIVE HOURS: ICD-10-PCS

## 2025-01-01 RX ORDER — SODIUM CHLORIDE 9 MG/ML
75 INJECTION, SOLUTION INTRAVENOUS CONTINUOUS
Status: DISCONTINUED | OUTPATIENT
Start: 2025-01-01 | End: 2025-01-01

## 2025-01-01 RX ORDER — POTASSIUM CHLORIDE 20 MEQ/1
40 TABLET, EXTENDED RELEASE ORAL EVERY 6 HOURS PRN
Status: DISCONTINUED | OUTPATIENT
Start: 2025-01-01 | End: 2025-01-01

## 2025-01-01 RX ORDER — MEMANTINE HYDROCHLORIDE 10 MG/1
10 TABLET ORAL 2 TIMES DAILY
Status: DISCONTINUED | OUTPATIENT
Start: 2025-01-01 | End: 2025-01-01

## 2025-01-01 RX ORDER — HYDROMORPHONE HYDROCHLORIDE 1 MG/ML
0.2 INJECTION, SOLUTION INTRAMUSCULAR; INTRAVENOUS; SUBCUTANEOUS
Status: DISCONTINUED | OUTPATIENT
Start: 2025-01-01 | End: 2025-01-01

## 2025-01-01 RX ORDER — FENTANYL CITRATE-0.9 % NACL/PF 10 MCG/ML
0-300 PLASTIC BAG, INJECTION (ML) INTRAVENOUS CONTINUOUS
Status: DISCONTINUED | OUTPATIENT
Start: 2025-01-01 | End: 2025-01-01

## 2025-01-01 RX ORDER — HALOPERIDOL 5 MG/ML
1 INJECTION INTRAMUSCULAR EVERY 4 HOURS PRN
Status: DISCONTINUED | OUTPATIENT
Start: 2025-01-01 | End: 2025-01-01 | Stop reason: HOSPADM

## 2025-01-01 RX ORDER — OXYBUTYNIN CHLORIDE 5 MG/1
5 TABLET ORAL 3 TIMES DAILY
Status: DISCONTINUED | OUTPATIENT
Start: 2025-01-01 | End: 2025-01-01

## 2025-01-01 RX ORDER — LORAZEPAM 2 MG/ML
0.5 INJECTION INTRAMUSCULAR EVERY 4 HOURS PRN
Status: DISCONTINUED | OUTPATIENT
Start: 2025-01-01 | End: 2025-01-01 | Stop reason: HOSPADM

## 2025-01-01 RX ORDER — HYDRALAZINE HYDROCHLORIDE 20 MG/ML
10 INJECTION INTRAMUSCULAR; INTRAVENOUS
Status: DISCONTINUED | OUTPATIENT
Start: 2025-01-01 | End: 2025-01-01

## 2025-01-01 RX ORDER — LABETALOL HYDROCHLORIDE 5 MG/ML
10 INJECTION, SOLUTION INTRAVENOUS EVERY 10 MIN PRN
Status: DISPENSED | OUTPATIENT
Start: 2025-01-01 | End: 2025-01-01

## 2025-01-01 RX ORDER — LABETALOL HYDROCHLORIDE 5 MG/ML
10 INJECTION, SOLUTION INTRAVENOUS EVERY 10 MIN PRN
Status: DISCONTINUED | OUTPATIENT
Start: 2025-01-01 | End: 2025-01-01

## 2025-01-01 RX ORDER — POTASSIUM CHLORIDE 1.5 G/1.58G
20 POWDER, FOR SOLUTION ORAL EVERY 6 HOURS PRN
Status: DISCONTINUED | OUTPATIENT
Start: 2025-01-01 | End: 2025-01-01

## 2025-01-01 RX ORDER — POTASSIUM CHLORIDE 20 MEQ/1
20 TABLET, EXTENDED RELEASE ORAL EVERY 6 HOURS PRN
Status: DISCONTINUED | OUTPATIENT
Start: 2025-01-01 | End: 2025-01-01

## 2025-01-01 RX ORDER — SODIUM CHLORIDE, SODIUM GLUCONATE, SODIUM ACETATE, POTASSIUM CHLORIDE AND MAGNESIUM CHLORIDE 30; 37; 368; 526; 502 MG/100ML; MG/100ML; MG/100ML; MG/100ML; MG/100ML
75 INJECTION, SOLUTION INTRAVENOUS CONTINUOUS
Status: DISCONTINUED | OUTPATIENT
Start: 2025-01-01 | End: 2025-01-01

## 2025-01-01 RX ORDER — LABETALOL HYDROCHLORIDE 5 MG/ML
10 INJECTION, SOLUTION INTRAVENOUS EVERY 10 MIN PRN
Status: CANCELLED | OUTPATIENT
Start: 2025-01-01 | End: 2025-02-01

## 2025-01-01 RX ORDER — MAGNESIUM SULFATE HEPTAHYDRATE 40 MG/ML
4 INJECTION, SOLUTION INTRAVENOUS EVERY 6 HOURS PRN
Status: DISCONTINUED | OUTPATIENT
Start: 2025-01-01 | End: 2025-01-01

## 2025-01-01 RX ORDER — DONEPEZIL HYDROCHLORIDE 10 MG/1
10 TABLET, FILM COATED ORAL NIGHTLY
Status: DISCONTINUED | OUTPATIENT
Start: 2025-01-01 | End: 2025-01-01

## 2025-01-01 RX ORDER — ACETAMINOPHEN AND CODEINE PHOSPHATE 300; 30 MG/1; MG/1
1 TABLET ORAL NIGHTLY
Status: DISCONTINUED | OUTPATIENT
Start: 2025-01-01 | End: 2025-01-01

## 2025-01-01 RX ORDER — PROPOFOL 10 MG/ML
5-50 INJECTION, EMULSION INTRAVENOUS CONTINUOUS
Status: DISCONTINUED | OUTPATIENT
Start: 2025-01-01 | End: 2025-01-01

## 2025-01-01 RX ORDER — SODIUM CHLORIDE, SODIUM LACTATE, POTASSIUM CHLORIDE, CALCIUM CHLORIDE 600; 310; 30; 20 MG/100ML; MG/100ML; MG/100ML; MG/100ML
75 INJECTION, SOLUTION INTRAVENOUS CONTINUOUS
Status: ACTIVE | OUTPATIENT
Start: 2025-01-01 | End: 2025-01-01

## 2025-01-01 RX ORDER — NIFEDIPINE 30 MG/1
30 TABLET, FILM COATED, EXTENDED RELEASE ORAL 2 TIMES DAILY
Status: DISCONTINUED | OUTPATIENT
Start: 2025-01-01 | End: 2025-01-01

## 2025-01-01 RX ORDER — ACETAMINOPHEN 160 MG/5ML
650 SOLUTION ORAL EVERY 4 HOURS PRN
Status: DISCONTINUED | OUTPATIENT
Start: 2025-01-01 | End: 2025-01-01 | Stop reason: HOSPADM

## 2025-01-01 RX ORDER — ACETAMINOPHEN 325 MG/1
650 TABLET ORAL EVERY 4 HOURS PRN
Status: DISCONTINUED | OUTPATIENT
Start: 2025-01-01 | End: 2025-01-01 | Stop reason: HOSPADM

## 2025-01-01 RX ORDER — GLYCOPYRROLATE 0.2 MG/ML
0.2 INJECTION INTRAMUSCULAR; INTRAVENOUS EVERY 4 HOURS PRN
Status: DISCONTINUED | OUTPATIENT
Start: 2025-01-01 | End: 2025-01-01 | Stop reason: HOSPADM

## 2025-01-01 RX ORDER — MAGNESIUM SULFATE HEPTAHYDRATE 40 MG/ML
2 INJECTION, SOLUTION INTRAVENOUS EVERY 6 HOURS PRN
Status: DISCONTINUED | OUTPATIENT
Start: 2025-01-01 | End: 2025-01-01

## 2025-01-01 RX ORDER — HYDROMORPHONE HYDROCHLORIDE 1 MG/ML
0.4 INJECTION, SOLUTION INTRAMUSCULAR; INTRAVENOUS; SUBCUTANEOUS
Status: DISCONTINUED | OUTPATIENT
Start: 2025-01-01 | End: 2025-01-01

## 2025-01-01 RX ORDER — FENTANYL CITRATE-0.9 % NACL/PF 10 MCG/ML
PLASTIC BAG, INJECTION (ML) INTRAVENOUS
Status: COMPLETED
Start: 2025-01-01 | End: 2025-01-01

## 2025-01-01 RX ORDER — CALCIUM GLUCONATE 20 MG/ML
1 INJECTION, SOLUTION INTRAVENOUS EVERY 6 HOURS PRN
Status: DISCONTINUED | OUTPATIENT
Start: 2025-01-01 | End: 2025-01-01

## 2025-01-01 RX ORDER — CALCIUM GLUCONATE 20 MG/ML
2 INJECTION, SOLUTION INTRAVENOUS EVERY 6 HOURS PRN
Status: DISCONTINUED | OUTPATIENT
Start: 2025-01-01 | End: 2025-01-01

## 2025-01-01 RX ORDER — NICARDIPINE HYDROCHLORIDE 0.2 MG/ML
1-15 INJECTION INTRAVENOUS CONTINUOUS PRN
Status: DISCONTINUED | OUTPATIENT
Start: 2025-01-01 | End: 2025-01-01

## 2025-01-01 RX ORDER — AMOXICILLIN 250 MG
2 CAPSULE ORAL 2 TIMES DAILY
Status: DISCONTINUED | OUTPATIENT
Start: 2025-01-01 | End: 2025-01-01

## 2025-01-01 RX ORDER — HYDRALAZINE HYDROCHLORIDE 20 MG/ML
10 INJECTION INTRAMUSCULAR; INTRAVENOUS
Status: DISPENSED | OUTPATIENT
Start: 2025-01-01 | End: 2025-01-01

## 2025-01-01 RX ORDER — ASCORBIC ACID 500 MG
500 TABLET ORAL DAILY
Status: DISCONTINUED | OUTPATIENT
Start: 2025-01-01 | End: 2025-01-01

## 2025-01-01 RX ORDER — POLYETHYLENE GLYCOL 3350 17 G/17G
17 POWDER, FOR SOLUTION ORAL DAILY
Status: DISCONTINUED | OUTPATIENT
Start: 2025-01-01 | End: 2025-01-01

## 2025-01-01 RX ORDER — ATROPINE SULFATE 0.1 MG/ML
INJECTION INTRAVENOUS
Status: DISPENSED
Start: 2025-01-01 | End: 2025-01-01

## 2025-01-01 RX ORDER — POTASSIUM CHLORIDE 1.5 G/1.58G
40 POWDER, FOR SOLUTION ORAL EVERY 6 HOURS PRN
Status: DISCONTINUED | OUTPATIENT
Start: 2025-01-01 | End: 2025-01-01

## 2025-01-01 RX ORDER — SODIUM CHLORIDE, SODIUM LACTATE, POTASSIUM CHLORIDE, CALCIUM CHLORIDE 600; 310; 30; 20 MG/100ML; MG/100ML; MG/100ML; MG/100ML
50 INJECTION, SOLUTION INTRAVENOUS CONTINUOUS
Status: DISCONTINUED | OUTPATIENT
Start: 2025-01-01 | End: 2025-01-01

## 2025-01-01 RX ORDER — HEPARIN SODIUM 5000 [USP'U]/ML
5000 INJECTION, SOLUTION INTRAVENOUS; SUBCUTANEOUS EVERY 8 HOURS
Status: DISCONTINUED | OUTPATIENT
Start: 2025-01-01 | End: 2025-01-01

## 2025-01-01 RX ORDER — HYDRALAZINE HYDROCHLORIDE 20 MG/ML
10 INJECTION INTRAMUSCULAR; INTRAVENOUS
Status: CANCELLED | OUTPATIENT
Start: 2025-01-01 | End: 2025-02-01

## 2025-01-01 RX ORDER — PANTOPRAZOLE SODIUM 40 MG/10ML
40 INJECTION, POWDER, LYOPHILIZED, FOR SOLUTION INTRAVENOUS DAILY
Status: DISCONTINUED | OUTPATIENT
Start: 2025-01-01 | End: 2025-01-01

## 2025-01-01 RX ORDER — PROPOFOL 10 MG/ML
INJECTION, EMULSION INTRAVENOUS
Status: COMPLETED
Start: 2025-01-01 | End: 2025-01-01

## 2025-01-01 RX ORDER — ALBUTEROL SULFATE 0.83 MG/ML
2.5 SOLUTION RESPIRATORY (INHALATION) EVERY 4 HOURS PRN
Status: DISCONTINUED | OUTPATIENT
Start: 2025-01-01 | End: 2025-01-01 | Stop reason: HOSPADM

## 2025-01-01 RX ORDER — OXYCODONE HYDROCHLORIDE 5 MG/1
5 TABLET ORAL EVERY 6 HOURS PRN
Status: DISCONTINUED | OUTPATIENT
Start: 2025-01-01 | End: 2025-01-01 | Stop reason: HOSPADM

## 2025-01-01 RX ORDER — OXYCODONE HYDROCHLORIDE 5 MG/1
10 TABLET ORAL EVERY 6 HOURS PRN
Status: DISCONTINUED | OUTPATIENT
Start: 2025-01-01 | End: 2025-01-01 | Stop reason: HOSPADM

## 2025-01-01 RX ADMIN — HYDRALAZINE HYDROCHLORIDE 10 MG: 20 INJECTION INTRAMUSCULAR; INTRAVENOUS at 02:03

## 2025-01-01 RX ADMIN — SENNOSIDES AND DOCUSATE SODIUM 2 TABLET: 50; 8.6 TABLET ORAL at 08:00

## 2025-01-01 RX ADMIN — Medication 30 PERCENT: at 20:33

## 2025-01-01 RX ADMIN — Medication 30 PERCENT: at 03:28

## 2025-01-01 RX ADMIN — SODIUM CHLORIDE 75 ML/HR: 9 INJECTION, SOLUTION INTRAVENOUS at 09:52

## 2025-01-01 RX ADMIN — PERFLUTREN 10 ML OF DILUTION: 6.52 INJECTION, SUSPENSION INTRAVENOUS at 16:05

## 2025-01-01 RX ADMIN — NIFEDIPINE 30 MG: 30 TABLET, FILM COATED, EXTENDED RELEASE ORAL at 20:11

## 2025-01-01 RX ADMIN — Medication 25 MCG/HR: at 03:59

## 2025-01-01 RX ADMIN — MEMANTINE 10 MG: 10 TABLET ORAL at 10:39

## 2025-01-01 RX ADMIN — OXYBUTYNIN CHLORIDE 5 MG: 5 TABLET ORAL at 10:38

## 2025-01-01 RX ADMIN — OXYBUTYNIN CHLORIDE 5 MG: 5 TABLET ORAL at 14:49

## 2025-01-01 RX ADMIN — SENNOSIDES AND DOCUSATE SODIUM 2 TABLET: 50; 8.6 TABLET ORAL at 10:38

## 2025-01-01 RX ADMIN — PANTOPRAZOLE SODIUM 40 MG: 40 INJECTION, POWDER, FOR SOLUTION INTRAVENOUS at 08:00

## 2025-01-01 RX ADMIN — NIFEDIPINE 30 MG: 30 TABLET, FILM COATED, EXTENDED RELEASE ORAL at 10:39

## 2025-01-01 RX ADMIN — SODIUM CHLORIDE, POTASSIUM CHLORIDE, SODIUM LACTATE AND CALCIUM CHLORIDE 75 ML/HR: 600; 310; 30; 20 INJECTION, SOLUTION INTRAVENOUS at 20:08

## 2025-01-01 RX ADMIN — MORPHINE SULFATE 2 MG/HR: 10 INJECTION INTRAVENOUS at 12:27

## 2025-01-01 RX ADMIN — NIFEDIPINE 30 MG: 30 TABLET, FILM COATED, EXTENDED RELEASE ORAL at 08:08

## 2025-01-01 RX ADMIN — OXYCODONE HYDROCHLORIDE AND ACETAMINOPHEN 500 MG: 500 TABLET ORAL at 08:00

## 2025-01-01 RX ADMIN — SODIUM CHLORIDE 500 ML: 9 INJECTION, SOLUTION INTRAVENOUS at 13:56

## 2025-01-01 RX ADMIN — Medication 30 PERCENT: at 20:48

## 2025-01-01 RX ADMIN — MEMANTINE 10 MG: 10 TABLET ORAL at 08:01

## 2025-01-01 RX ADMIN — SODIUM CHLORIDE, SODIUM GLUCONATE, SODIUM ACETATE, POTASSIUM CHLORIDE AND MAGNESIUM CHLORIDE 75 ML/HR: 526; 502; 368; 37; 30 INJECTION, SOLUTION INTRAVENOUS at 16:30

## 2025-01-01 RX ADMIN — SODIUM CHLORIDE, SODIUM GLUCONATE, SODIUM ACETATE, POTASSIUM CHLORIDE AND MAGNESIUM CHLORIDE 75 ML/HR: 526; 502; 368; 37; 30 INJECTION, SOLUTION INTRAVENOUS at 10:39

## 2025-01-01 RX ADMIN — ACETAMINOPHEN 650 MG: 325 TABLET, FILM COATED ORAL at 10:53

## 2025-01-01 RX ADMIN — SODIUM CHLORIDE 500 ML: 9 INJECTION, SOLUTION INTRAVENOUS at 15:57

## 2025-01-01 RX ADMIN — SODIUM CHLORIDE, SODIUM GLUCONATE, SODIUM ACETATE, POTASSIUM CHLORIDE AND MAGNESIUM CHLORIDE 75 ML/HR: 526; 502; 368; 37; 30 INJECTION, SOLUTION INTRAVENOUS at 01:01

## 2025-01-01 RX ADMIN — HEPARIN SODIUM 5000 UNITS: 5000 INJECTION, SOLUTION INTRAVENOUS; SUBCUTANEOUS at 13:00

## 2025-01-01 RX ADMIN — HEPARIN SODIUM 5000 UNITS: 5000 INJECTION, SOLUTION INTRAVENOUS; SUBCUTANEOUS at 05:54

## 2025-01-01 RX ADMIN — DONEPEZIL HYDROCHLORIDE 10 MG: 10 TABLET, FILM COATED ORAL at 20:11

## 2025-01-01 RX ADMIN — OXYCODONE HYDROCHLORIDE AND ACETAMINOPHEN 500 MG: 500 TABLET ORAL at 10:38

## 2025-01-01 RX ADMIN — LABETALOL HYDROCHLORIDE 10 MG: 5 INJECTION, SOLUTION INTRAVENOUS at 03:03

## 2025-01-01 RX ADMIN — OXYBUTYNIN CHLORIDE 5 MG: 5 TABLET ORAL at 08:00

## 2025-01-01 RX ADMIN — Medication 30 PERCENT: at 07:55

## 2025-01-01 RX ADMIN — ACETAMINOPHEN 650 MG: 325 TABLET, FILM COATED ORAL at 06:29

## 2025-01-01 RX ADMIN — PANTOPRAZOLE SODIUM 40 MG: 40 INJECTION, POWDER, FOR SOLUTION INTRAVENOUS at 10:03

## 2025-01-01 RX ADMIN — PANTOPRAZOLE SODIUM 40 MG: 40 INJECTION, POWDER, FOR SOLUTION INTRAVENOUS at 08:01

## 2025-01-01 RX ADMIN — HEPARIN SODIUM 5000 UNITS: 5000 INJECTION, SOLUTION INTRAVENOUS; SUBCUTANEOUS at 06:07

## 2025-01-01 RX ADMIN — PANTOPRAZOLE SODIUM 40 MG: 40 INJECTION, POWDER, FOR SOLUTION INTRAVENOUS at 10:38

## 2025-01-01 RX ADMIN — SODIUM CHLORIDE, SODIUM GLUCONATE, SODIUM ACETATE, POTASSIUM CHLORIDE AND MAGNESIUM CHLORIDE 75 ML/HR: 526; 502; 368; 37; 30 INJECTION, SOLUTION INTRAVENOUS at 12:34

## 2025-01-01 RX ADMIN — Medication 50 PERCENT: at 03:35

## 2025-01-01 RX ADMIN — HEPARIN SODIUM 5000 UNITS: 5000 INJECTION, SOLUTION INTRAVENOUS; SUBCUTANEOUS at 22:05

## 2025-01-01 RX ADMIN — HEPARIN SODIUM 5000 UNITS: 5000 INJECTION, SOLUTION INTRAVENOUS; SUBCUTANEOUS at 06:13

## 2025-01-01 RX ADMIN — NIFEDIPINE 30 MG: 30 TABLET, FILM COATED, EXTENDED RELEASE ORAL at 08:00

## 2025-01-01 RX ADMIN — SENNOSIDES AND DOCUSATE SODIUM 2 TABLET: 50; 8.6 TABLET ORAL at 20:11

## 2025-01-01 RX ADMIN — HEPARIN SODIUM 5000 UNITS: 5000 INJECTION, SOLUTION INTRAVENOUS; SUBCUTANEOUS at 22:31

## 2025-01-01 RX ADMIN — NIFEDIPINE 30 MG: 30 TABLET, FILM COATED, EXTENDED RELEASE ORAL at 20:13

## 2025-01-01 RX ADMIN — HEPARIN SODIUM 5000 UNITS: 5000 INJECTION, SOLUTION INTRAVENOUS; SUBCUTANEOUS at 14:49

## 2025-01-01 RX ADMIN — OXYBUTYNIN CHLORIDE 5 MG: 5 TABLET ORAL at 20:11

## 2025-01-01 RX ADMIN — DONEPEZIL HYDROCHLORIDE 10 MG: 10 TABLET, FILM COATED ORAL at 20:13

## 2025-01-01 RX ADMIN — OXYBUTYNIN CHLORIDE 5 MG: 5 TABLET ORAL at 20:13

## 2025-01-01 RX ADMIN — SODIUM CHLORIDE, POTASSIUM CHLORIDE, SODIUM LACTATE AND CALCIUM CHLORIDE 75 ML/HR: 600; 310; 30; 20 INJECTION, SOLUTION INTRAVENOUS at 08:09

## 2025-01-01 RX ADMIN — GLYCOPYRROLATE 0.2 MG: 0.2 INJECTION, SOLUTION INTRAMUSCULAR; INTRAVENOUS at 06:43

## 2025-01-01 RX ADMIN — MEMANTINE 10 MG: 10 TABLET ORAL at 20:13

## 2025-01-01 RX ADMIN — POLYETHYLENE GLYCOL 3350 17 G: 17 POWDER, FOR SOLUTION ORAL at 08:00

## 2025-01-01 RX ADMIN — PROPOFOL 10 MCG/KG/MIN: 10 INJECTION, EMULSION INTRAVENOUS at 03:58

## 2025-01-01 RX ADMIN — Medication 30 PERCENT: at 07:56

## 2025-01-01 RX ADMIN — SENNOSIDES AND DOCUSATE SODIUM 2 TABLET: 50; 8.6 TABLET ORAL at 20:13

## 2025-01-01 RX ADMIN — MEMANTINE 10 MG: 10 TABLET ORAL at 08:00

## 2025-01-01 RX ADMIN — HYDRALAZINE HYDROCHLORIDE 10 MG: 20 INJECTION INTRAMUSCULAR; INTRAVENOUS at 22:05

## 2025-01-01 RX ADMIN — Medication 30 PERCENT: at 20:42

## 2025-01-01 RX ADMIN — HEPARIN SODIUM 5000 UNITS: 5000 INJECTION, SOLUTION INTRAVENOUS; SUBCUTANEOUS at 13:02

## 2025-01-01 RX ADMIN — MEMANTINE 10 MG: 10 TABLET ORAL at 20:11

## 2025-01-01 RX ADMIN — POLYETHYLENE GLYCOL 3350 17 G: 17 POWDER, FOR SOLUTION ORAL at 10:38

## 2025-01-01 SDOH — SOCIAL STABILITY: SOCIAL INSECURITY: ARE YOU OR HAVE YOU BEEN THREATENED OR ABUSED PHYSICALLY, EMOTIONALLY, OR SEXUALLY BY ANYONE?: UNABLE TO ASSESS

## 2025-01-01 SDOH — SOCIAL STABILITY: SOCIAL INSECURITY: DO YOU FEEL ANYONE HAS EXPLOITED OR TAKEN ADVANTAGE OF YOU FINANCIALLY OR OF YOUR PERSONAL PROPERTY?: UNABLE TO ASSESS

## 2025-01-01 SDOH — SOCIAL STABILITY: SOCIAL INSECURITY: DO YOU FEEL UNSAFE GOING BACK TO THE PLACE WHERE YOU ARE LIVING?: UNABLE TO ASSESS

## 2025-01-01 SDOH — SOCIAL STABILITY: SOCIAL INSECURITY: ABUSE: ADULT

## 2025-01-01 SDOH — SOCIAL STABILITY: SOCIAL INSECURITY: HAVE YOU HAD THOUGHTS OF HARMING ANYONE ELSE?: UNABLE TO ASSESS

## 2025-01-01 SDOH — SOCIAL STABILITY: SOCIAL INSECURITY: DOES ANYONE TRY TO KEEP YOU FROM HAVING/CONTACTING OTHER FRIENDS OR DOING THINGS OUTSIDE YOUR HOME?: UNABLE TO ASSESS

## 2025-01-01 SDOH — SOCIAL STABILITY: SOCIAL INSECURITY: HAVE YOU HAD ANY THOUGHTS OF HARMING ANYONE ELSE?: UNABLE TO ASSESS

## 2025-01-01 SDOH — SOCIAL STABILITY: SOCIAL INSECURITY: ARE THERE ANY APPARENT SIGNS OF INJURIES/BEHAVIORS THAT COULD BE RELATED TO ABUSE/NEGLECT?: UNABLE TO ASSESS

## 2025-01-01 SDOH — SOCIAL STABILITY: SOCIAL INSECURITY: WERE YOU ABLE TO COMPLETE ALL THE BEHAVIORAL HEALTH SCREENINGS?: NO

## 2025-01-01 SDOH — SOCIAL STABILITY: SOCIAL INSECURITY: HAS ANYONE EVER THREATENED TO HURT YOUR FAMILY OR YOUR PETS?: UNABLE TO ASSESS

## 2025-01-01 ASSESSMENT — PAIN SCALES - GENERAL
PAINLEVEL_OUTOF10: 0 - NO PAIN
PAINLEVEL_OUTOF10: 0 - NO PAIN

## 2025-01-01 ASSESSMENT — COGNITIVE AND FUNCTIONAL STATUS - GENERAL
DAILY ACTIVITIY SCORE: 6
TOILETING: TOTAL
STANDING UP FROM CHAIR USING ARMS: TOTAL
MOVING TO AND FROM BED TO CHAIR: TOTAL
HELP NEEDED FOR BATHING: TOTAL
EATING MEALS: TOTAL
MOBILITY SCORE: 6
CLIMB 3 TO 5 STEPS WITH RAILING: TOTAL
DRESSING REGULAR UPPER BODY CLOTHING: TOTAL
DRESSING REGULAR LOWER BODY CLOTHING: TOTAL
TURNING FROM BACK TO SIDE WHILE IN FLAT BAD: TOTAL
MOVING FROM LYING ON BACK TO SITTING ON SIDE OF FLAT BED WITH BEDRAILS: TOTAL
WALKING IN HOSPITAL ROOM: TOTAL
PERSONAL GROOMING: TOTAL

## 2025-01-01 ASSESSMENT — RESPIRATORY DISTRESS OBSERVATION SCALE (RDOS)
PARADOXICAL BREATHING PATTERN: 0 - NONE
RDOS TOTAL SCORE: 0
HEART RATE PER MINUTE: 0 - <90 BEATS
GRUNTING AT END OF EXPIRATION: 0 - NONE
LOOK OF FEAR: 0 - NONE
HEART RATE PER MINUTE: 0 - <90 BEATS
RESPIRATORY RATE PER MINUTE: 0 - <19 BREATHS
PARADOXICAL BREATHING PATTERN: 0 - NONE
HEART RATE PER MINUTE: 0 - <90 BEATS
RESTLESS NONPURPOSEFUL MOVEMENTS: 0 - NONE
ACCESSORY MUSCLE RISE IN CLAVICLE DURING INSPIRATION: 0 - NONE
RDOS TOTAL SCORE: 0
ACCESSORY MUSCLE RISE IN CLAVICLE DURING INSPIRATION: 0 - NONE
LOOK OF FEAR: 0 - NONE
HEART RATE PER MINUTE: 0 - <90 BEATS
RDOS TOTAL SCORE: 0
ACCESSORY MUSCLE RISE IN CLAVICLE DURING INSPIRATION: 0 - NONE
RESPIRATORY RATE PER MINUTE: 0 - <19 BREATHS
RESPIRATORY RATE PER MINUTE: 0 - <19 BREATHS
HEART RATE PER MINUTE: 0 - <90 BEATS
GRUNTING AT END OF EXPIRATION: 0 - NONE
RESPIRATORY RATE PER MINUTE: 0 - <19 BREATHS
INVOLUNTARY NASAL FLARING: 0 - NONE
RESTLESS NONPURPOSEFUL MOVEMENTS: 0 - NONE
LOOK OF FEAR: 0 - NONE
PARADOXICAL BREATHING PATTERN: 0 - NONE
RESTLESS NONPURPOSEFUL MOVEMENTS: 0 - NONE
HEART RATE PER MINUTE: 0 - <90 BEATS
HEART RATE PER MINUTE: 0 - <90 BEATS
ACCESSORY MUSCLE RISE IN CLAVICLE DURING INSPIRATION: 0 - NONE
RDOS TOTAL SCORE: 0
GRUNTING AT END OF EXPIRATION: 0 - NONE
GRUNTING AT END OF EXPIRATION: 0 - NONE
HEART RATE PER MINUTE: 0 - <90 BEATS
RESTLESS NONPURPOSEFUL MOVEMENTS: 0 - NONE
RESTLESS NONPURPOSEFUL MOVEMENTS: 0 - NONE
PARADOXICAL BREATHING PATTERN: 0 - NONE
PARADOXICAL BREATHING PATTERN: 0 - NONE
HEART RATE PER MINUTE: 0 - <90 BEATS
HEART RATE PER MINUTE: 0 - <90 BEATS
RESTLESS NONPURPOSEFUL MOVEMENTS: 0 - NONE
RESTLESS NONPURPOSEFUL MOVEMENTS: 0 - NONE
RDOS TOTAL SCORE: 0
PARADOXICAL BREATHING PATTERN: 0 - NONE
RDOS TOTAL SCORE: 0
RESTLESS NONPURPOSEFUL MOVEMENTS: 0 - NONE
RDOS TOTAL SCORE: 0
RESPIRATORY RATE PER MINUTE: 0 - <19 BREATHS
PARADOXICAL BREATHING PATTERN: 0 - NONE
GRUNTING AT END OF EXPIRATION: 0 - NONE
LOOK OF FEAR: 0 - NONE
ACCESSORY MUSCLE RISE IN CLAVICLE DURING INSPIRATION: 0 - NONE
INVOLUNTARY NASAL FLARING: 0 - NONE
RESPIRATORY RATE PER MINUTE: 0 - <19 BREATHS
INVOLUNTARY NASAL FLARING: 0 - NONE
RDOS TOTAL SCORE: 0
LOOK OF FEAR: 0 - NONE
INVOLUNTARY NASAL FLARING: 0 - NONE
RESTLESS NONPURPOSEFUL MOVEMENTS: 0 - NONE
RDOS TOTAL SCORE: 0
LOOK OF FEAR: 0 - NONE
ACCESSORY MUSCLE RISE IN CLAVICLE DURING INSPIRATION: 0 - NONE
INVOLUNTARY NASAL FLARING: 0 - NONE
RDOS TOTAL SCORE: 0
RESPIRATORY RATE PER MINUTE: 0 - <19 BREATHS
RESTLESS NONPURPOSEFUL MOVEMENTS: 0 - NONE
LOOK OF FEAR: 0 - NONE
RESPIRATORY RATE PER MINUTE: 0 - <19 BREATHS
INVOLUNTARY NASAL FLARING: 0 - NONE
ACCESSORY MUSCLE RISE IN CLAVICLE DURING INSPIRATION: 0 - NONE
INVOLUNTARY NASAL FLARING: 0 - NONE
LOOK OF FEAR: 0 - NONE
RESPIRATORY RATE PER MINUTE: 0 - <19 BREATHS
INVOLUNTARY NASAL FLARING: 0 - NONE
INVOLUNTARY NASAL FLARING: 0 - NONE
LOOK OF FEAR: 0 - NONE
ACCESSORY MUSCLE RISE IN CLAVICLE DURING INSPIRATION: 0 - NONE
RESPIRATORY RATE PER MINUTE: 0 - <19 BREATHS
RDOS TOTAL SCORE: 0
GRUNTING AT END OF EXPIRATION: 0 - NONE
PARADOXICAL BREATHING PATTERN: 0 - NONE
RDOS TOTAL SCORE: 0
PARADOXICAL BREATHING PATTERN: 0 - NONE
PARADOXICAL BREATHING PATTERN: 0 - NONE
HEART RATE PER MINUTE: 0 - <90 BEATS
RESTLESS NONPURPOSEFUL MOVEMENTS: 0 - NONE
HEART RATE PER MINUTE: 0 - <90 BEATS
GRUNTING AT END OF EXPIRATION: 0 - NONE
RESTLESS NONPURPOSEFUL MOVEMENTS: 0 - NONE
RESTLESS NONPURPOSEFUL MOVEMENTS: 0 - NONE
PARADOXICAL BREATHING PATTERN: 0 - NONE
RDOS TOTAL SCORE: 0
ACCESSORY MUSCLE RISE IN CLAVICLE DURING INSPIRATION: 0 - NONE
INVOLUNTARY NASAL FLARING: 0 - NONE
ACCESSORY MUSCLE RISE IN CLAVICLE DURING INSPIRATION: 0 - NONE
RESPIRATORY RATE PER MINUTE: 0 - <19 BREATHS
GRUNTING AT END OF EXPIRATION: 0 - NONE
GRUNTING AT END OF EXPIRATION: 0 - NONE
PARADOXICAL BREATHING PATTERN: 0 - NONE
RESPIRATORY RATE PER MINUTE: 0 - <19 BREATHS
INVOLUNTARY NASAL FLARING: 0 - NONE
GRUNTING AT END OF EXPIRATION: 0 - NONE
HEART RATE PER MINUTE: 0 - <90 BEATS
LOOK OF FEAR: 0 - NONE
ACCESSORY MUSCLE RISE IN CLAVICLE DURING INSPIRATION: 0 - NONE
LOOK OF FEAR: 0 - NONE
INVOLUNTARY NASAL FLARING: 0 - NONE
GRUNTING AT END OF EXPIRATION: 0 - NONE
PARADOXICAL BREATHING PATTERN: 0 - NONE
INVOLUNTARY NASAL FLARING: 0 - NONE
RESPIRATORY RATE PER MINUTE: 0 - <19 BREATHS

## 2025-01-01 ASSESSMENT — PAIN - FUNCTIONAL ASSESSMENT
PAIN_FUNCTIONAL_ASSESSMENT: CPOT (CRITICAL CARE PAIN OBSERVATION TOOL)
PAIN_FUNCTIONAL_ASSESSMENT: 0-10
PAIN_FUNCTIONAL_ASSESSMENT: CPOT (CRITICAL CARE PAIN OBSERVATION TOOL)

## 2025-01-01 ASSESSMENT — PATIENT HEALTH QUESTIONNAIRE - PHQ9
2. FEELING DOWN, DEPRESSED OR HOPELESS: NOT AT ALL
SUM OF ALL RESPONSES TO PHQ9 QUESTIONS 1 & 2: 0
1. LITTLE INTEREST OR PLEASURE IN DOING THINGS: NOT AT ALL

## 2025-01-01 ASSESSMENT — LIFESTYLE VARIABLES
SKIP TO QUESTIONS 9-10: 0
AUDIT-C TOTAL SCORE: -1
SUBSTANCE_ABUSE_PAST_12_MONTHS: NO
PRESCIPTION_ABUSE_PAST_12_MONTHS: NO
HOW OFTEN DO YOU HAVE 6 OR MORE DRINKS ON ONE OCCASION: PATIENT UNABLE TO ANSWER
HOW MANY STANDARD DRINKS CONTAINING ALCOHOL DO YOU HAVE ON A TYPICAL DAY: PATIENT UNABLE TO ANSWER
AUDIT-C TOTAL SCORE: -1
HOW OFTEN DO YOU HAVE A DRINK CONTAINING ALCOHOL: PATIENT UNABLE TO ANSWER

## 2025-01-01 ASSESSMENT — COLUMBIA-SUICIDE SEVERITY RATING SCALE - C-SSRS
6. HAVE YOU EVER DONE ANYTHING, STARTED TO DO ANYTHING, OR PREPARED TO DO ANYTHING TO END YOUR LIFE?: NO
2. HAVE YOU ACTUALLY HAD ANY THOUGHTS OF KILLING YOURSELF?: NO
1. IN THE PAST MONTH, HAVE YOU WISHED YOU WERE DEAD OR WISHED YOU COULD GO TO SLEEP AND NOT WAKE UP?: NO

## 2025-01-01 ASSESSMENT — ACTIVITIES OF DAILY LIVING (ADL): LACK_OF_TRANSPORTATION: PATIENT UNABLE TO ANSWER

## 2025-01-26 ENCOUNTER — HOSPITAL ENCOUNTER (INPATIENT)
Facility: HOSPITAL | Age: 85
DRG: 947 | End: 2025-01-26
Attending: EMERGENCY MEDICINE | Admitting: STUDENT IN AN ORGANIZED HEALTH CARE EDUCATION/TRAINING PROGRAM
Payer: COMMERCIAL

## 2025-01-26 VITALS
BODY MASS INDEX: 24.06 KG/M2 | WEIGHT: 130.73 LBS | HEART RATE: 80 BPM | OXYGEN SATURATION: 94 % | SYSTOLIC BLOOD PRESSURE: 199 MMHG | RESPIRATION RATE: 24 BRPM | HEIGHT: 62 IN | TEMPERATURE: 97.3 F | DIASTOLIC BLOOD PRESSURE: 124 MMHG

## 2025-01-26 DIAGNOSIS — R47.1 DYSARTHRIA: ICD-10-CM

## 2025-01-26 DIAGNOSIS — I10 HYPERTENSION, UNSPECIFIED TYPE: ICD-10-CM

## 2025-01-26 DIAGNOSIS — I67.89 OTHER CEREBROVASCULAR DISEASE: ICD-10-CM

## 2025-01-26 DIAGNOSIS — I10 HTN (HYPERTENSION), BENIGN: ICD-10-CM

## 2025-01-26 DIAGNOSIS — R53.1 LEFT-SIDED WEAKNESS: Primary | ICD-10-CM

## 2025-01-26 LAB
ALBUMIN SERPL BCP-MCNC: 3.4 G/DL (ref 3.4–5)
ALP SERPL-CCNC: 105 U/L (ref 33–136)
ALT SERPL W P-5'-P-CCNC: 37 U/L (ref 7–45)
ANION GAP SERPL CALC-SCNC: 12 MMOL/L (ref 10–20)
APTT PPP: 21 SECONDS (ref 27–38)
AST SERPL W P-5'-P-CCNC: 27 U/L (ref 9–39)
BASOPHILS # BLD AUTO: 0.04 X10*3/UL (ref 0–0.1)
BASOPHILS NFR BLD AUTO: 0.3 %
BILIRUB SERPL-MCNC: 0.4 MG/DL (ref 0–1.2)
BUN SERPL-MCNC: 38 MG/DL (ref 6–23)
CALCIUM SERPL-MCNC: 10.2 MG/DL (ref 8.6–10.3)
CARDIAC TROPONIN I PNL SERPL HS: 40 NG/L (ref 0–13)
CHLORIDE SERPL-SCNC: 104 MMOL/L (ref 98–107)
CO2 SERPL-SCNC: 25 MMOL/L (ref 21–32)
CREAT SERPL-MCNC: 1.69 MG/DL (ref 0.5–1.05)
EGFRCR SERPLBLD CKD-EPI 2021: 30 ML/MIN/1.73M*2
EOSINOPHIL # BLD AUTO: 0.08 X10*3/UL (ref 0–0.4)
EOSINOPHIL NFR BLD AUTO: 0.7 %
ERYTHROCYTE [DISTWIDTH] IN BLOOD BY AUTOMATED COUNT: 13.8 % (ref 11.5–14.5)
GLUCOSE BLD MANUAL STRIP-MCNC: 103 MG/DL (ref 74–99)
GLUCOSE SERPL-MCNC: 124 MG/DL (ref 74–99)
HCT VFR BLD AUTO: 45.3 % (ref 36–46)
HGB BLD-MCNC: 15.1 G/DL (ref 12–16)
IMM GRANULOCYTES # BLD AUTO: 0.04 X10*3/UL (ref 0–0.5)
IMM GRANULOCYTES NFR BLD AUTO: 0.3 % (ref 0–0.9)
INR PPP: 1 (ref 0.9–1.1)
LYMPHOCYTES # BLD AUTO: 1.35 X10*3/UL (ref 0.8–3)
LYMPHOCYTES NFR BLD AUTO: 11 %
MCH RBC QN AUTO: 31.8 PG (ref 26–34)
MCHC RBC AUTO-ENTMCNC: 33.3 G/DL (ref 32–36)
MCV RBC AUTO: 95 FL (ref 80–100)
MONOCYTES # BLD AUTO: 0.78 X10*3/UL (ref 0.05–0.8)
MONOCYTES NFR BLD AUTO: 6.4 %
NEUTROPHILS # BLD AUTO: 9.99 X10*3/UL (ref 1.6–5.5)
NEUTROPHILS NFR BLD AUTO: 81.3 %
NRBC BLD-RTO: 0 /100 WBCS (ref 0–0)
PLATELET # BLD AUTO: 306 X10*3/UL (ref 150–450)
POTASSIUM SERPL-SCNC: 4 MMOL/L (ref 3.5–5.3)
PROT SERPL-MCNC: 6.1 G/DL (ref 6.4–8.2)
PROTHROMBIN TIME: 10.9 SECONDS (ref 9.8–12.8)
RBC # BLD AUTO: 4.75 X10*6/UL (ref 4–5.2)
RBC MORPH BLD: NORMAL
SODIUM SERPL-SCNC: 137 MMOL/L (ref 136–145)
WBC # BLD AUTO: 12.3 X10*3/UL (ref 4.4–11.3)

## 2025-01-26 PROCEDURE — 3E03317 INTRODUCTION OF OTHER THROMBOLYTIC INTO PERIPHERAL VEIN, PERCUTANEOUS APPROACH: ICD-10-PCS | Performed by: EMERGENCY MEDICINE

## 2025-01-26 PROCEDURE — 36415 COLL VENOUS BLD VENIPUNCTURE: CPT | Performed by: EMERGENCY MEDICINE

## 2025-01-26 PROCEDURE — 70450 CT HEAD/BRAIN W/O DYE: CPT | Performed by: STUDENT IN AN ORGANIZED HEALTH CARE EDUCATION/TRAINING PROGRAM

## 2025-01-26 PROCEDURE — 70498 CT ANGIOGRAPHY NECK: CPT | Performed by: SURGERY

## 2025-01-26 PROCEDURE — 82947 ASSAY GLUCOSE BLOOD QUANT: CPT

## 2025-01-26 PROCEDURE — 85610 PROTHROMBIN TIME: CPT | Performed by: EMERGENCY MEDICINE

## 2025-01-26 PROCEDURE — 85730 THROMBOPLASTIN TIME PARTIAL: CPT | Performed by: EMERGENCY MEDICINE

## 2025-01-26 PROCEDURE — 84484 ASSAY OF TROPONIN QUANT: CPT | Performed by: EMERGENCY MEDICINE

## 2025-01-26 PROCEDURE — 99223 1ST HOSP IP/OBS HIGH 75: CPT

## 2025-01-26 PROCEDURE — 85025 COMPLETE CBC W/AUTO DIFF WBC: CPT | Performed by: EMERGENCY MEDICINE

## 2025-01-26 PROCEDURE — 2500000005 HC RX 250 GENERAL PHARMACY W/O HCPCS: Performed by: EMERGENCY MEDICINE

## 2025-01-26 PROCEDURE — 80053 COMPREHEN METABOLIC PANEL: CPT | Performed by: EMERGENCY MEDICINE

## 2025-01-26 PROCEDURE — 2550000001 HC RX 255 CONTRASTS: Performed by: EMERGENCY MEDICINE

## 2025-01-26 PROCEDURE — 96374 THER/PROPH/DIAG INJ IV PUSH: CPT

## 2025-01-26 PROCEDURE — 99291 CRITICAL CARE FIRST HOUR: CPT

## 2025-01-26 PROCEDURE — 2500000004 HC RX 250 GENERAL PHARMACY W/ HCPCS (ALT 636 FOR OP/ED)

## 2025-01-26 PROCEDURE — 70496 CT ANGIOGRAPHY HEAD: CPT | Performed by: SURGERY

## 2025-01-26 PROCEDURE — 2020000001 HC ICU ROOM DAILY

## 2025-01-26 PROCEDURE — 2500000004 HC RX 250 GENERAL PHARMACY W/ HCPCS (ALT 636 FOR OP/ED): Performed by: EMERGENCY MEDICINE

## 2025-01-26 PROCEDURE — G0425 INPT/ED TELECONSULT30: HCPCS | Performed by: STUDENT IN AN ORGANIZED HEALTH CARE EDUCATION/TRAINING PROGRAM

## 2025-01-26 PROCEDURE — 99291 CRITICAL CARE FIRST HOUR: CPT | Performed by: EMERGENCY MEDICINE

## 2025-01-26 RX ORDER — TRANEXAMIC ACID 100 MG/ML
500 INJECTION, SOLUTION INTRAVENOUS ONCE
Status: COMPLETED | OUTPATIENT
Start: 2025-01-26 | End: 2025-01-26

## 2025-01-26 RX ORDER — LABETALOL HYDROCHLORIDE 5 MG/ML
10 INJECTION, SOLUTION INTRAVENOUS EVERY 10 MIN PRN
Status: DISCONTINUED | OUTPATIENT
Start: 2025-01-26 | End: 2025-01-27 | Stop reason: HOSPADM

## 2025-01-26 RX ORDER — HYDRALAZINE HYDROCHLORIDE 25 MG/1
25 TABLET, FILM COATED ORAL EVERY 6 HOURS PRN
Status: DISCONTINUED | OUTPATIENT
Start: 2025-01-28 | End: 2025-01-27 | Stop reason: HOSPADM

## 2025-01-26 RX ORDER — HYDRALAZINE HYDROCHLORIDE 20 MG/ML
10 INJECTION INTRAMUSCULAR; INTRAVENOUS
Status: DISCONTINUED | OUTPATIENT
Start: 2025-01-26 | End: 2025-01-27 | Stop reason: HOSPADM

## 2025-01-26 RX ORDER — HYDRALAZINE HYDROCHLORIDE 20 MG/ML
10 INJECTION INTRAMUSCULAR; INTRAVENOUS
Status: DISCONTINUED | OUTPATIENT
Start: 2025-01-26 | End: 2025-01-26

## 2025-01-26 RX ORDER — ONDANSETRON HYDROCHLORIDE 2 MG/ML
4 INJECTION, SOLUTION INTRAVENOUS EVERY 6 HOURS PRN
Status: DISCONTINUED | OUTPATIENT
Start: 2025-01-26 | End: 2025-01-27 | Stop reason: HOSPADM

## 2025-01-26 RX ORDER — ATORVASTATIN CALCIUM 40 MG/1
40 TABLET, FILM COATED ORAL NIGHTLY
Status: DISCONTINUED | OUTPATIENT
Start: 2025-01-26 | End: 2025-01-27 | Stop reason: HOSPADM

## 2025-01-26 RX ORDER — LABETALOL HYDROCHLORIDE 5 MG/ML
10 INJECTION, SOLUTION INTRAVENOUS EVERY 10 MIN PRN
Status: DISCONTINUED | OUTPATIENT
Start: 2025-01-26 | End: 2025-01-26

## 2025-01-26 RX ORDER — ACETAMINOPHEN 325 MG/1
650 TABLET ORAL EVERY 4 HOURS PRN
Status: DISCONTINUED | OUTPATIENT
Start: 2025-01-26 | End: 2025-01-27 | Stop reason: HOSPADM

## 2025-01-26 RX ORDER — NIFEDIPINE 30 MG/1
30 TABLET, FILM COATED, EXTENDED RELEASE ORAL 2 TIMES DAILY
Status: DISCONTINUED | OUTPATIENT
Start: 2025-01-26 | End: 2025-01-27 | Stop reason: HOSPADM

## 2025-01-26 RX ORDER — MEMANTINE HYDROCHLORIDE 5 MG/1
10 TABLET ORAL 2 TIMES DAILY
Status: DISCONTINUED | OUTPATIENT
Start: 2025-01-26 | End: 2025-01-26

## 2025-01-26 RX ADMIN — IOHEXOL 100 ML: 350 INJECTION, SOLUTION INTRAVENOUS at 20:22

## 2025-01-26 RX ADMIN — TRANEXAMIC ACID 500 MG: 1 INJECTION, SOLUTION INTRAVENOUS at 21:46

## 2025-01-26 RX ADMIN — LABETALOL HYDROCHLORIDE 10 MG: 5 INJECTION, SOLUTION INTRAVENOUS at 23:32

## 2025-01-26 RX ADMIN — Medication 15 MG: at 20:32

## 2025-01-26 ASSESSMENT — PAIN - FUNCTIONAL ASSESSMENT
PAIN_FUNCTIONAL_ASSESSMENT: 0-10

## 2025-01-26 ASSESSMENT — COGNITIVE AND FUNCTIONAL STATUS - GENERAL
PERSONAL GROOMING: TOTAL
DRESSING REGULAR UPPER BODY CLOTHING: TOTAL
DAILY ACTIVITIY SCORE: 6
TOILETING: TOTAL
WALKING IN HOSPITAL ROOM: TOTAL
DRESSING REGULAR LOWER BODY CLOTHING: TOTAL
MOVING TO AND FROM BED TO CHAIR: TOTAL
MOVING FROM LYING ON BACK TO SITTING ON SIDE OF FLAT BED WITH BEDRAILS: TOTAL
MOBILITY SCORE: 6
TURNING FROM BACK TO SIDE WHILE IN FLAT BAD: TOTAL
HELP NEEDED FOR BATHING: TOTAL
STANDING UP FROM CHAIR USING ARMS: TOTAL
CLIMB 3 TO 5 STEPS WITH RAILING: TOTAL
EATING MEALS: TOTAL

## 2025-01-26 ASSESSMENT — ENCOUNTER SYMPTOMS
ENDOCRINE NEGATIVE: 1
CARDIOVASCULAR NEGATIVE: 1
HEMATOLOGIC/LYMPHATIC NEGATIVE: 1
SPEECH DIFFICULTY: 1
CONSTITUTIONAL NEGATIVE: 1
FACIAL ASYMMETRY: 1
GASTROINTESTINAL NEGATIVE: 1
EYES NEGATIVE: 1
WEAKNESS: 1
RESPIRATORY NEGATIVE: 1
PSYCHIATRIC NEGATIVE: 1

## 2025-01-26 ASSESSMENT — LIFESTYLE VARIABLES
EVER FELT BAD OR GUILTY ABOUT YOUR DRINKING: NO
HAVE PEOPLE ANNOYED YOU BY CRITICIZING YOUR DRINKING: NO
EVER HAD A DRINK FIRST THING IN THE MORNING TO STEADY YOUR NERVES TO GET RID OF A HANGOVER: NO
TOTAL SCORE: 0
HAVE YOU EVER FELT YOU SHOULD CUT DOWN ON YOUR DRINKING: NO

## 2025-01-26 ASSESSMENT — PAIN SCALES - GENERAL
PAINLEVEL_OUTOF10: 0 - NO PAIN

## 2025-01-26 ASSESSMENT — PAIN DESCRIPTION - PROGRESSION: CLINICAL_PROGRESSION: NOT CHANGED

## 2025-01-27 VITALS
WEIGHT: 130.73 LBS | TEMPERATURE: 96.4 F | OXYGEN SATURATION: 99 % | RESPIRATION RATE: 22 BRPM | HEART RATE: 64 BPM | HEIGHT: 62 IN | DIASTOLIC BLOOD PRESSURE: 95 MMHG | BODY MASS INDEX: 24.06 KG/M2 | SYSTOLIC BLOOD PRESSURE: 141 MMHG

## 2025-01-27 PROBLEM — I61.9 INTRACEREBRAL HEMORRHAGE, NONTRAUMATIC (MULTI): Status: ACTIVE | Noted: 2025-01-01

## 2025-01-27 LAB
ABO GROUP (TYPE) IN BLOOD: NORMAL
ABO GROUP (TYPE) IN BLOOD: NORMAL
ANTIBODY SCREEN: NORMAL
ATRIAL RATE: 0 BPM
BLOOD EXPIRATION DATE: NORMAL
DISPENSE STATUS: NORMAL
FIBRINOGEN PPP-MCNC: 390 MG/DL (ref 200–400)
PR INTERVAL: 75 MS
PRODUCT BLOOD TYPE: 7300
PRODUCT CODE: NORMAL
Q ONSET: 251 MS
QRS COUNT: 18 BEATS
QRS DURATION: 138 MS
QT INTERVAL: 432 MS
QTC CALCULATION(BAZETT): 634 MS
QTC FREDERICIA: 558 MS
R AXIS: -85 DEGREES
RH FACTOR (ANTIGEN D): NORMAL
RH FACTOR (ANTIGEN D): NORMAL
T AXIS: 29 DEGREES
T OFFSET: 467 MS
UNIT ABO: NORMAL
UNIT NUMBER: NORMAL
UNIT RH: NORMAL
UNIT VOLUME: 82
VENTRICULAR RATE: 129 BPM

## 2025-01-27 PROCEDURE — 99239 HOSP IP/OBS DSCHRG MGMT >30: CPT | Performed by: STUDENT IN AN ORGANIZED HEALTH CARE EDUCATION/TRAINING PROGRAM

## 2025-01-27 PROCEDURE — 70450 CT HEAD/BRAIN W/O DYE: CPT | Performed by: STUDENT IN AN ORGANIZED HEALTH CARE EDUCATION/TRAINING PROGRAM

## 2025-01-27 PROCEDURE — 36415 COLL VENOUS BLD VENIPUNCTURE: CPT

## 2025-01-27 PROCEDURE — 85384 FIBRINOGEN ACTIVITY: CPT

## 2025-01-27 PROCEDURE — 86901 BLOOD TYPING SEROLOGIC RH(D): CPT

## 2025-01-27 NOTE — CONSULTS
"Consults    History Of Present Illness:  Historian: Family and ED Provider   Marli Canales is a 84 y.o. female presenting with a history of remote breast cancer and cognitive impairment (chemotherapy and age related) who presented to the hospital with stroke like symptoms.    She has been following with Dr. Anna Suresh in Neurology for management of chemotherapy related cognitive impairment. She also has seen Dr. Sergio Millan, last seen 9/2024. MOCA at that time was 20/30. She is independent in ADLs but  assists with some of her mobility.    He reports the last year she has had more incontinence. Today, they were watching the football game when she started to feel more lethargic. About 7pm, he helped her go to the bathroom when she then had trouble standing up. He noticed that she was having weakness of her left leg and her speech was slurred.     She was taken to  [place]. Initially she had some right sided weakness and slurred speech. On repeat evaluation, she had only LLE weakness and slurred speech. nIHSS 3.     She had no contraindications to tNK.     Last known well: 1900  Had stroke symptoms resolved at time of presentation: No     Prior Functional Status (Modified Hercules Scale):  3 The patient has moderate disability; required some external help but able to walk without the assistance of another individual.     Stroke Risk Factors:  TIA    Last Recorded Vitals:  There were no vitals taken for this visit.      NIHSS: 3        Relevant Results:  LABS:  No results found for: \"GLUCOSE\", \"INR\"   No results found for this or any previous visit (from the past 24 hours).     CT Head Imaging:  CT imaging personally reviewed, showed no acute ischemic / hemorrhagic changes   Moderate small vessel ischemic disease, similar to previous imaging     CTA Head and Neck Imaging:  CTA head and neck imaging personally reviewed, no large vessel occlusion or severe stenosis seen    CT Perfusion " Imaging:  NA    Diagnosis:  Supect Acute Ischemic Stroke    IV Thrombolysis IV Thrombolysis Checklist      IV Thrombolysis Given: Yes Thrombolysis Administration: administration time 2032 Patient meets inclusion and exclusion criteria with expected benefits exceeding the risks of IV thrombolysis therapy or withholding therapy. Patient/ family understand potential risks & benefits and consent to IV Thrombolysis. Discussed the diagnosis of suspected ischemic stroke and options for treatment, including alternative treatments. For patients meeting inclusion and exclusion criteria, the expected benefits exceed the risks of IV thrombolysis therapy or withholding therapy. The main risk of IV thrombolysis therapy is bleeding into the brain or body that may require blood transfusions or lead to death. In clinical studies, the rate of death was not higher in patients who received IV thrombolysis compared to those who did not. Other risks include allergic reactions, and with any procedure there is always the possibility of an unexpected complication.  Patient is <18 - refer [facility] for [facility] Management of pediatric patients with Acute suspected Stroke & the Pediatric IV Thrombolysis Consent. Consent is completed on a paper document and if criteria is met/ benefit outweigh the risk the thrombolytic Alteplase should be given.  :99}Were there delays to thrombolysis administration?: no          Patient is a candidate for thrombectomy:  yes/no: No; contraindication reason: No evidence of proximal occlusion    Additional Recommendations:  Stroke workup  MRI brain w/o contrast stroke protocol  EKG, troponin  Utox  TTE w/ bubble study  Lipid panel, A1c  Hold antithrombotics due to tNK administration  Lipid Goals: education on healthy diet and statin therapy to maintain or achieve goal LDL-cholesterol < 70mg Atorvastatin 40mg  Blood pressure goals: avoid hypotension SBP <100 that could worsen cerebral perfusion, Ischemic stroke-  early permissive hypertension SBP < 220 mmHg with cautious inpatient lowering  Glucose Goals: early treatment of hyperglycemia to goal glucose 140-180 mg/dl with long-term goal A1c < 7%   NPO until nurse bedside swallow assessment   Consider focused stroke order set   Smoking Cessation and Education  Assessment for Rehabilitation needs   Patient and family education on signs and symptoms of stroke, calling 911, healthy strategies for stroke prevention.      Disposition:  Patient will remain at referring facility for further evaluation and management.    Virtual or Telephone Consent    An interactive audio and video telecommunication system which permits real time communications between the patient (at the originating site) and provider (at the distant site) was utilized to provide this telehealth service.   Verbal consent was requested and obtained from patient's  Nito Gross this date, 01/26/25 for a telehealth visit.    Virtual Visit start time: 8:17 PM  Time spent on chart review, documentation and video evaluation 30 minutes     Dagmar Saeed MD

## 2025-01-27 NOTE — CONSULTS
Nutrition Note:   Nutrition Assessment    Reason for Assessment: Admission nursing screening  Malnutrition Screening Tool (MST)  Have you recently lost weight without trying?: Unsure  If yes, how much weight have you lost?: Unsure  Weight Loss Score: 2  Have you been eating poorly because of a decreased appetite?: No  Malnutrition Score: 2    Patient is a 84 y.o. female presenting with acute L weakness and L FD.  CTH negative, CTA no LVO, incidental L palatine tonsil mass, s/p TNK, CTH L thalamic and brain stem ICH.    Currently intubated with no enteral access.    Bedside RN informed RDN that plan is for GOC discussion today; therefore, full assessment not appropriate at this time.     Nutrition Interventions/Recommendations     Nutrition Interventions/Goals:   Interventions: No interventions at this time     Please reconsult this service as needed pending outcome of GOC discussion with family.       Nutrition Monitoring and Evaluation      Time Spent (min): 20 minutes

## 2025-01-27 NOTE — CARE PLAN
Problem: Pain - Adult  Goal: Verbalizes/displays adequate comfort level or baseline comfort level  Outcome: Progressing     Problem: Safety - Adult  Goal: Free from fall injury  Outcome: Progressing     Problem: General Stroke  Goal: Demonstrate improvement in neurological exam throughout the shift  Outcome: Progressing  Goal: Maintain BP within ordered limits throughout shift  Outcome: Progressing  Goal: No symptoms of aspiration throughout shift  Outcome: Progressing     Problem: ICU Stroke  Goal: Maintain patent airway throughout shift  Outcome: Progressing     Problem: Skin  Goal: Prevent/manage excess moisture  Outcome: Progressing

## 2025-01-27 NOTE — PROGRESS NOTES
[facility]  NEUROSCIENCE INTENSIVE CARE UNIT  DAILY PROGRESS NOTE       Patient Name: Marli aCnales   MRN: [id number]     Admit Date: 2025     : 1940 AGE: 84 y.o. GENDER: female        Subjective  Marli Canales is 84 y.o. 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  presented to [place] ED with acute L weakness, slurred speech, and L FD.  LKW  1900. CTH negative, CTA no LVO, incidental L palatine tonsil mass s/p TNK at [place]  @. several hours later pt showed anisocoria with L pupil greater than R. Stat CT head was ordered, showing a large acute hemorrhage involving the isreal midbrain extending into the L thalamus (vol < 30 ml). Fibrinogen & INR were WNL. Cryo not given. Pt intubated for airway protect prior to transferring to [facility] for further management of ICH. En route to [facility], pt had an episode of bradycardia to 30s requiring Atropine.     In regards to her Dementia, sees Dr. Sergio Millan (last 2024), MOCA at that time . At baseline she is independent with ADLs but husbands assists with mobility.     Significant Events:  - : LKW  1900 s/p TNK at [place]  @  - : Transferred to [facility]    Interval Events: Admitted to NSU overnight     Objective   VITALS (24H):  Temp:  [35.6 °C (96.1 °F)-36.3 °C (97.3 °F)] 35.6 °C (96.1 °F)  Heart Rate:  [53-92] 53  Resp:  [8-29] 14  BP: (122-199)/() 122/82  FiO2 (%):  [50 %] 50 %  INTAKE/OUTPUT:  Intake/Output Summary (Last 24 hours) at 2025 0612  Last data filed at 2025 0555  Gross per 24 hour   Intake 6.94 ml   Output 500 ml   Net -493.06 ml     VENT SETTINGS:  Vent Mode: Volume control/assist control  FiO2 (%):  [50 %] 50 %  S RR:  [14] 14  S VT:  [350 mL] 350 mL  PEEP/CPAP (cm H2O):  [5 cm H20] 5 cm H20  MAP (cm H2O):  [7.6] 7.6      PHYSICAL EXAM:  NEURO:  - Prop & fent paused for 20 min  - ECNS, not grimacing to nox stim. not following commands  - Anisocoria present: R pupil 1 mm, L pupil oval 3 mm. Both pupils nonreactive   - BUE flaccid, BLE triple flexion L >R, tongue deviated to left   - negative Weak corneal right, absent on left, +C/ weak gag  CV:  - Bradycardia 50s, occasional PACs    RESP:  - No signs of resp distress and intubated  - Oxygen: 50% fio2, PEEP 5    :  - urinary catheter in place with yellow urine   GI:  - Abdomen NT/ND, soft  SKIN:  - Intact    MEDICATIONS:  Scheduled: PRN: Continuous:   acetaminophen-codeine, 1 tablet, oral, Nightly  ascorbic acid, 500 mg, oral, Daily  atropine, , ,   donepezil, 10 mg, oral, Nightly  memantine, 10 mg, oral, BID  NIFEdipine ER, 30 mg, oral, BID  oxybutynin, 5 mg, oral, TID  polyethylene glycol, 17 g, oral, Daily  sennosides-docusate sodium, 2 tablet, oral, BID     PRN medications: atropine, fentaNYL, hydrALAZINE, labetaloL, niCARdipine, oxygen, oxygen fentaNYL, 0-300 mcg/hr, Last Rate: 25 mcg/hr (01/27/25 0352)  niCARdipine, 1-15 mg/hr  propofol, 5-50 mcg/kg/min, Last Rate: 10 mcg/kg/min (01/27/25 6849)         LAB RESULTS:      IMAGING RESULTS:  No orders to display          Assessment/Plan    84 y.o. female with PMH MH of HTN, HLD, hyperparaT, breast cancer s/p R mastectomy, depression/anxiety, frontotemporal demntia (MOCA at that time was 20/30) .  Admitted 1/27/2025 with Intracerebral hemorrhage, nontraumatic (Multi) after presenting with hemorrhage involving the isreal, midbrain, & L thalamus (vol < 30 ml).     NEURO:  #L thalamic & brainstem ICH   Assessment:  - Exam as above   Plan:  - NSU  - Neuro Checks: Q1H  - Sedation: Propofol and Fentanyl - holding currently   - Pain: fent gtt  - Nausea: None  - Stroke consulted, NSGY primary  - Fu 6 hr stability scan   - GOC discussion   - TTE w/ bubble study    CARDIOVASCULAR:  #HTN  #HLD  #Bradycardia  Assessment:  - Home med:  Nifedipine 30 mg BID  - ECHO: pending from this admission     - s/p atropine en route to [facility] for thea 30s. Currently thea 50s, BP stable  Plan:  - Continue to monitor on telemetry  - BP goal: 130-150    --> PRN: Labetalol and Hydralazine   - fu ECHO  - 12 lead EKG     RESPIRATORY:  #respiratory insufficiency 2/2 ICH requiring intubation for airway protection  Assessment:  - Intubated at OSH prior to transfer to [facility] for airway protection 1/26  Plan:  - Continuous pulse oximetry   - O2 PRN to maintain SpO2 > 94%, wean as tolerated  - Ventilator bundle while intubated and Daily CPAP and weaning parameters while intubated    RENAL/:  #JAZMIN  Assessment:  - Baseline BUN/Cr: 19/ .97-1  Results from last 72 hours   Lab Units 01/27/25  0406 01/26/25 2025   BUN mg/dL 39* 38*   CREATININE mg/dL 1.47* 1.69*       Plan:  - Monitor with daily RFP  - Maintain mckenna catheter for: critically ill patient who need accurate urinary output measurements  - Avoid nephrotoxic agents  - 500 ml Nacl bolus for renal hydration     FEN/GI:  #STU  Assessment:  -  Last BM: PTA  Plan:  - Monitor and replace electrolytes per protocol  - IVF: NS @ 75 mL/hr  - Diet: NPO   - Bowel Regimen: Docusate-Senna BID and Miralax QD    ENDOCRINE:  #hyperparaT  Assessment:  Results from last 7 days   Lab Units 01/27/25  0408 01/27/25  0406 01/26/25 2025 01/26/25 2000   POCT GLUCOSE mg/dL 136*  --   --  103*   GLUCOSE mg/dL  --  144* 124*  --    SODIUM mmol/L  --  144 137  --         Plan:  - Accuchecks & ISS PRN     HEMATOLOGY:  #STU  Assessment:  - Baseline Hgb: 15  - Baseline Plts: 295  Results from last 7 days   Lab Units 01/27/25  0406 01/26/25 2108   HEMOGLOBIN g/dL 12.8 15.1   HEMATOCRIT % 39.0 45.3   PLATELETS AUTO x10*3/uL 213 306     Plan:  - Continue to monitor with daily CBC and Coag panel    INFECTIOUS DISEASE:  #STU  Assessment:  Results from last 7 days   Lab Units 01/27/25  0406 01/26/25 2108   WBC AUTO x10*3/uL 8.5 12.3*    - Temp  (24hrs), Av.8 °C (96.5 °F), Min:35.6 °C (96.1 °F), Max:36.3 °C (97.3 °F)     Plan:  - Continue to monitor for s/sx of infection  - Pan culture for temperature > 38.4 C    MUSCULOSKELETAL:  - No acute issues    SKIN:  - No acute issues  - Turns and skin care per NSU protocol    ACCESS:  - PIV    PROPHYLAXIS:  - DVT Ppx: SCDs  - GI Ppx: Pantoprazole    RESTRAINTS:  Not indicated/Patient does not meet criteria for restraints    Callie Deras, APRN-CNP  Neuroscience Intensive Care       Total critical care time of 60 minutes, with > 50% of time spent in direct contact with patient/family for education, counseling and coordination of care.

## 2025-01-27 NOTE — ED PROVIDER NOTES
HPI   No chief complaint on file.      Patient was initially seen in triage.  Patient has left-sided weakness with left-sided facial droop and is leaning to the left in the wheelchair.  Patient has very slurred speech.  She is able to answer questions appropriately.  Patient was made a stroke alert and taken straight to CT.    I had further discussion with the  in the room waiting for the patient to come back to the room from CT.  Patient is been weak for many years.  She normally has a little bit of difficulty navigating into and out of a car or around furniture.  She does walk without assistance.  About an hour and a half ago patient's  noted that she became very difficult to get up from a chair.  She could not walk from chair to living room.  She seemed to not want to put any weight on her left leg or her left leg did not seem to work properly.  Because of this he decided to get her into the car and bring her to the hospital.                          No data recorded                Patient History   Past Medical History:   Diagnosis Date    Personal history of other diseases of the respiratory system     History of asthma     Past Surgical History:   Procedure Laterality Date    BREAST LUMPECTOMY  10/05/2015    Breast Surgery Lumpectomy    MR HEAD ANGIO WO IV CONTRAST  2017    MR HEAD ANGIO WO IV CONTRAST [facility]    OTHER SURGICAL HISTORY  2014    Breast Surgery Reconstruction    TOTAL KNEE ARTHROPLASTY  2014    Knee Replacement     Family History   Problem Relation Name Age of Onset    Breast cancer Mother      Other (Suicide) Mother           at 46    Heart attack Father           in 80s    Throat cancer Father      Breast cancer Sister           at 47    No Known Problems Brother       Social History     Tobacco Use    Smoking status: Never    Smokeless tobacco: Never   Vaping Use    Vaping status: Never Used   Substance Use Topics    Alcohol use: Yes      Alcohol/week: 7.0 standard drinks of alcohol     Types: 7 Glasses of wine per week     Comment: glass of wine with dinner daily    Drug use: Never       Physical Exam   ED Triage Vitals   Temp Pulse Resp BP   -- -- -- --      SpO2 Temp src Heart Rate Source Patient Position   -- -- -- --      BP Location FiO2 (%)     -- --       Physical Exam  HENT:      Head: Normocephalic.      Nose: Nose normal.      Mouth/Throat:      Mouth: Mucous membranes are moist.   Eyes:      Extraocular Movements: Extraocular movements intact.      Pupils: Pupils are equal, round, and reactive to light.   Cardiovascular:      Rate and Rhythm: Normal rate and regular rhythm.      Pulses: Normal pulses.      Heart sounds: Normal heart sounds.   Pulmonary:      Effort: Pulmonary effort is normal.      Breath sounds: Normal breath sounds. No wheezing, rhonchi or rales.   Abdominal:      General: Abdomen is flat. Bowel sounds are normal.      Palpations: Abdomen is soft.      Tenderness: There is no abdominal tenderness. There is no guarding or rebound.   Musculoskeletal:      Cervical back: Normal range of motion.      Comments: Patient has no  strength on the left arm.  She has weakness in the left leg in triage evaluation.   Skin:     General: Skin is warm and dry.      Capillary Refill: Capillary refill takes less than 2 seconds.   Neurological:      Mental Status: She is alert and oriented to person, place, and time.      Sensory: No sensory deficit.      Motor: Weakness present.      Comments: Patient has left-sided facial droop, dysarthria and left-sided weakness.   Psychiatric:         Mood and Affect: Mood normal.         Behavior: Behavior normal.       Labs Reviewed   CBC WITH AUTO DIFFERENTIAL   COMPREHENSIVE METABOLIC PANEL   TROPONIN I, HIGH SENSITIVITY   PROTIME-INR   APTT   POCT GLUCOSE METER     Pain Management Panel  More data exists         Latest Ref Rng & Units 11/10/2023 11/8/2022   Pain Management Panel   Amphetamine  Screen, Urine Presumptive Negative Presumptive Negative  PRESUMPTIVE NEGATIVE    Barbiturate Screen, Urine Presumptive Negative Presumptive Negative  PRESUMPTIVE NEGATIVE    Codeine IgE Cutoff <50 ng/mL - <50    Hydromorphone Urine Cutoff <25 ng/mL - <25    Morphine  Cutoff <50 ng/mL - <50      CT brain attack head wo IV contrast    (Results Pending)   CT brain attack angio head and neck W and WO IV contrast    (Results Pending)     ED Course & MDM   Diagnoses as of 01/26/25 2042   Left-sided weakness   Dysarthria       Medical Decision Making  Patient was taken straight to CT.  CT head noncontrast shows no evidence of intracranial hemorrhage.  CT angiogram shows no evidence of large vessel occlusion.  There is evidence of a soft tissue mass in the left palatine tonsil measuring 2.7 x 1.9 cm in diameter.  This is suspicious for squamous cell carcinoma.  Patient was reevaluated once she was brought back to the room.  Patient does seem to have improved NIH but continues to have significant dysarthria and a left lower extremity drift.  Patient was seen by telemedicine by stroke neurology at the same time of this reevaluation.  At this time patient's symptoms do seem to be acutely disabling so tenecteplase was ordered.   does give consent for tenecteplase.  Patient does not have evidence of large vessel occlusion and does not meet criteria for thrombectomy.  Patient will be admitted to our facility for further monitoring after tenecteplase.  EKG was performed at 8:33 PM.  It is sinus rhythm rate of 74.  Some artifact limitation.  No acute ST elevation.  QTc is 480.  Patient was discussed with the physician assistant on for the hospitalist group and plan is for admission to the ICU for post tenecteplase care.        Procedure  Critical Care    Performed by: Yun Mcdonald MD  Authorized by: Yun Mcdonald MD    Critical care provider statement:     Critical care time (minutes):  30    Critical care  time was exclusive of:  Separately billable procedures and treating other patients    Critical care was necessary to treat or prevent imminent or life-threatening deterioration of the following conditions: stroke alert patient.    Critical care was time spent personally by me on the following activities:  Discussions with consultants, ordering and review of laboratory studies, ordering and review of radiographic studies, re-evaluation of patient's condition, review of old charts and examination of patient    Care discussed with: admitting provider         Yun Mcdonald MD  01/26/25 [id number]

## 2025-01-27 NOTE — H&P
[facility] - GENERAL MEDICINE HISTORY AND PHYSICAL    Collateral History (Secondary Sources): Family members, ED provider, chart review    History Of Present Illness (HPI):  Marli Canales is a 84 y.o. female with PMHx s/f HTN, HLD, hyperparathyroidism, breast cancer status post right mastectomy, depression/anxiety, frontotemporal dementia with behavioral disturbance, TIA presenting with left-sided weakness, left-sided facial droop. LKW around 7pm.  Per , she has been weak for many years but was able to walk without assistant.  Today when she was going to the bathroom her  notes of her abnormal gait and was mostly helping her carry her weight to and from the bathroom. He put her in the chair but she was unable to get up. Later, he notes of her slurred speech and left facial droop and decided to bring her to ED for further evaluation. Patient has severe dysarthria and unable to obtain further history.    ED Course (Summary - please note all labs, imaging studies, and interventions noted below have been personally reviewed and/or interpreted on day of admission):   Vitals on presentation: 97.3 F, 80 bpm, 17 RR, 157/110, 95% on RA  Labs: CMP-glucose 124, BUN/creatinine 30/1.69, EGFR 30  Troponin 40>> repeat pending  EKG: Sinus arrhythmia at 74 bpm, artifact, no acute ST elevation.  QTc 480.  Imaging: CTA head-no acute large vessel infarct or intracranial hemorrhage  CTA head and neck-no significant stenosis of cervical vessels or intracranial vessels. Soft tissue mass in the left palatine tonsil measuring 2.7 x 1.9 cm in diameter. This is suspicious for squamous cell carcinoma.   Interventions: TNK, TXA to bleeding gum, admission to ICU for further management    12-point ROS reviewed and found to be negative aside from aforementioned positives in HPI and/or noted in dedicated ROS section below.     ED Course (From ED Provider):  Diagnoses as of 01/26/25 2158   Left-sided weakness    Dysarthria   HTN (hypertension), benign   Hypertension, unspecified type   Other cerebrovascular disease     Relevant Results  Results for orders placed or performed during the hospital encounter of 01/26/25 (from the past 24 hours)   POCT GLUCOSE   Result Value Ref Range    POCT Glucose 103 (H) 74 - 99 mg/dL   Comprehensive metabolic panel   Result Value Ref Range    Glucose 124 (H) 74 - 99 mg/dL    Sodium 137 136 - 145 mmol/L    Potassium 4.0 3.5 - 5.3 mmol/L    Chloride 104 98 - 107 mmol/L    Bicarbonate 25 21 - 32 mmol/L    Anion Gap 12 10 - 20 mmol/L    Urea Nitrogen 38 (H) 6 - 23 mg/dL    Creatinine 1.69 (H) 0.50 - 1.05 mg/dL    eGFR 30 (L) >60 mL/min/1.73m*2    Calcium 10.2 8.6 - 10.3 mg/dL    Albumin 3.4 3.4 - 5.0 g/dL    Alkaline Phosphatase 105 33 - 136 U/L    Total Protein 6.1 (L) 6.4 - 8.2 g/dL    AST 27 9 - 39 U/L    Bilirubin, Total 0.4 0.0 - 1.2 mg/dL    ALT 37 7 - 45 U/L   Troponin I, High Sensitivity   Result Value Ref Range    Troponin I, High Sensitivity 40 (H) 0 - 13 ng/L      CT brain attack angio head and neck W and WO IV contrast    Result Date: 1/26/2025  Interpreted By:  Rojelio Stout, STUDY: CT BRAIN ATTACK ANGIO HEAD AND NECK W AND WO IV CONTRAST;  1/26/2025 8:21 pm   INDICATION: Signs/Symptoms:left sided weakness.     COMPARISON: Correlation made to noncontrast head CT of 01/26/2025.   ACCESSION NUMBER(S): [id number]   ORDERING CLINICIAN: ELPIDIO HEBRET   TECHNIQUE: Unenhanced CT images of the head were obtained. Subsequently, 100 ML of Omnipaque 350 was administered intravenously and axial images of the head and neck were acquired.  Coronal, sagittal, and 3-D reconstructions were provided for review.   FINDINGS:   Evaluation of the neck arteries, particularly in the suprahyoid neck, is limited by extensive artifact from dental amalgam and dense IV contrast in the venous structures.   CTA HEAD FINDINGS:   Anterior circulation: The bilateral intracranial internal carotid arteries,  bilateral carotid terminals, bilateral proximal anterior and middle cerebral arteries are normal.   Posterior circulation: Bilateral intracranial vertebral arteries, vertebrobasilar junction, basilar artery and proximal posterior cerebral arteries are normal.   No intracranial saccular aneurysm or other definite abnormal enhancement is seen.   CTA NECK FINDINGS:   Evaluation of carotid bifurcations and cervical ICAs is limited by artifact.   Carotid vessels: The common carotid artery is normal. The carotid bifurcation is normal. The internal carotid artery in the neck is normal. 0% ICA narrowing by NASCET criteria.   Left carotid vessels: The common carotid artery is normal. The carotid bifurcation is normal. The internal carotid artery in the neck is normal. 0% ICA narrowing by NASCET criteria.   Vertebral vessels:  The visualized segments of the cervical vertebral arteries are normal in caliber.   There is a soft tissue mass involving the left palatine tonsil, measuring up to 2.7 x 1.9 cm in transaxial diameters, 2.1 cm in CC diameter (series 12, image 79; series 15, image 24), highly suspicious for squamous cell carcinoma ENT evaluation for direct visualization and histopathologic correlation recommended.       Evaluation of the neck arteries, particularly in the suprahyoid neck, is limited by extensive artifact from dental amalgam and dense IV contrast in the venous structures.   No evidence for definite significant stenosis of the cervical vessels. Evaluation of the carotid bifurcations and ICAs is limited by artifact.   No evidence for significant stenosis or large branch vessel cutoffs of the intracranial vessels.   There is a soft tissue mass involving the left palatine tonsil, measuring up to 2.7 x 1.9 cm in transaxial diameters, 2.1 cm in CC diameter (series 12, image 79; series 15, image 24), highly suspicious for squamous cell carcinoma ENT evaluation for direct visualization and histopathologic  correlation recommended.   MACRO: Rojelio Stout discussed the significance and urgency of this critical finding by epic secure chat with  ELPIDIO HEBERT on 1/26/2025 at 8:37 pm.  (**-RCF-**) Findings:  See findings.   Signed by: Rojelio Stout 1/26/2025 8:38 PM Dictation workstation:   [id number]    CT brain attack head wo IV contrast    Result Date: 1/26/2025  Interpreted By:  Reji Solorio, STUDY: CT BRAIN ATTACK HEAD WO IV CONTRAST;  1/26/2025 8:07 pm   INDICATION: Signs/Symptoms:Stroke Evaluation.  Left-sided weakness, left paralysis.   COMPARISON: CT head 08/19/2023.   ACCESSION NUMBER(S): [id number]   ORDERING CLINICIAN: ELPIDIO HEBERT   TECHNIQUE: Noncontrast axial CT scan of head was performed.   FINDINGS: Parenchyma: No evidence of acute large vessel territory infarct. No acute intracranial hemorrhage. No mass effect or midline shift.Similar confluent periventricular white matter hypodensities, likely chronic microvascular ischemic change.Similar moderate parenchymal atrophy.   CSF Spaces: The ventricles, sulci and basal cisterns are proportional to the degree of parenchymal volume loss.   Extra-Axial Fluid: None.   Calvarium/bone: No acute depressed calvarial fracture. Unchanged remote nasal bone fractures.   Paranasal sinuses: There is opacification of the visualized left maxillary sinus with internal air bubbles.   Mastoids: Clear.   Orbits: No acute abnormality.   Soft tissues: Unremarkable.       No CT evidence of acute large vessel territory infarct or intracranial hemorrhage. Please correlate with the separately reported CT angiogram.   Similar chronic microvascular change and parenchymal atrophy.   MACRO: Reji Solorio discussed the significance and urgency of this critical finding by Epic secure chat with  ELPIDIO HEBERT on 1/26/2025 at 8:15 pm.  (**-RCF-**) Findings:  See findings.   Signed by: Reji Solorio 1/26/2025 8:16 PM Dictation workstation:   [id number]    Scheduled  medications:  atorvastatin, 40 mg, oral, Nightly  memantine, 10 mg, oral, BID  [Held by provider] NIFEdipine ER, 30 mg, oral, BID      Continuous medications:     PRN medications:  PRN medications: acetaminophen, hydrALAZINE **FOLLOWED BY** [START ON 2025] hydrALAZINE, labetaloL, ondansetron     Past Medical History  She has a past medical history of Personal history of other diseases of the respiratory system.    Surgical History  She has a past surgical history that includes Total knee arthroplasty (2014); Other surgical history (2014); Breast lumpectomy (10/05/2015); and MR angio head wo IV contrast (2017).     Social History  She reports that she has never smoked. She has never used smokeless tobacco. She reports current alcohol use of about 7.0 standard drinks of alcohol per week. She reports that she does not use drugs.    Family History  Family History   Problem Relation Name Age of Onset    Breast cancer Mother      Other (Suicide) Mother           at 46    Heart attack Father           in 80s    Throat cancer Father      Breast cancer Sister           at 47    No Known Problems Brother         Allergies  Patient has no known allergies.    Code Status  Full Code     Review of Systems   Unable to perform ROS: Acuity of condition       Last Recorded Vitals  BP (!) 154/93   Pulse 75   Temp 36.3 °C (97.3 °F) (Temporal)   Resp (!) 27   Wt 59.3 kg (130 lb 11.7 oz)   SpO2 95%      Physical Exam:  Vital signs and nursing notes reviewed.   Constitutional: Pleasant and cooperative. Laying in bed in no acute distress.   Skin: Warm and dry; no obvious lesions, rashes, pallor, or jaundice.   Eyes: EOMI. Anicteric sclera.   ENT: Mucous membranes moist; bleeding gums on left side noted.   Head and Neck: Normocephalic, atraumatic.    Respiratory: Nonlabored on RA. Lungs clear to auscultation bilaterally without obvious adventitious sounds. Chest rise is equal.  Cardiovascular: RRR. No  gross murmur, gallop, or rub. Extremities are warm and well-perfused with good capillary refill (< 3 seconds). No chest wall tenderness.   GI: Abdomen soft, nontender, nondistended. No obvious organomegaly appreciated. Bowel sounds are present.  MSK: No gross abnormalities appreciated.   Extremities: No cyanosis, edema, or clubbing evident. Neurovascularly intact.   Neuro: Awake and alert. See below for detailed exam.   Psych: Appropriate mood and behavior.    1a  Level of consciousness: 0=alert; keenly responsive   1b. LOC questions:  2=Performs neither task correctly   1c. LOC commands: 0=Performs both tasks correctly   2.  Best Gaze: 1=partial gaze palsy   3. Visual: 0=No visual loss   4. Facial Palsy: 0=Normal symmetric movement   5a. Motor left arm: 0=No drift, limb holds 90 (or 45) degrees for full 10 seconds   5b.  Motor right arm: 0=No drift, limb holds 90 (or 45) degrees for full 10 seconds   6a. motor left le=Some effort against gravity, limb cannot get to or maintain (if cured) 90 (or 45) degrees, drifts down to bed, but has some effort against gravity   6b  Motor right le=No drift, limb holds 90 (or 45) degrees for full 10 seconds   7. Limb Ataxia: 0=Absent   8.  Sensory: 0=Normal; no sensory loss   9. Best Language:  0=No aphasia, normal   10. Dysarthria: 2=Severe; patient speech is so slurred as to be unintelligible in the absence of or our of proportion to any dysphagia, or is mute/anarthric   11. Extinction and Inattention: 0=No abnormality     Total:   7     Assessment/Plan     84 y.o. female with PMHx s/f HTN, HLD, hyperparathyroidism, breast cancer status post right mastectomy, depression/anxiety, frontotemporal dementia with behavioral disturbance, TIA presenting with left-sided weakness, left-sided facial droop.    Stroke-like symptoms s/p TNK  -CTH: No evidence of intracranial hemorrhage or large vessel occlusion  -MRI Brain w/o contrast (24 hours s/p TNK)  -Echocardiogram with bubble  study  -Telemetry   -Labs: lipid panel  -A1c is 5.6% in 11/14/2024  -Holding anticoagulants, or antiplatelet drugs for at least 24 hrs per TNK protocol  -started on statin 40 mg nightly  -BP maintained at or below 180/105 mmHg for at least 24 hrs  -target serum glucose of 140 to 180 mg/dL  -PT/OT/SLP   -Neurology consult    Bleeding gums on left side s/p TNK  -TXA soaked gauze applied     JAZMIN  -sCr 1.69 at admit (baseline: 0.9)  -s/p IV contrast for imaging  -avoid nephrotoxins, monitor for now    Left palatine tonsil mass, concerning for squamous cell carcinoma  -follow-up with outpatient ENT    HTN  -hold nifedipine   -BP maintained at or below 180/105 mmHg for at least 24 hrs s/p TNK    Frontotemporal dementia with behavioral disturbances  -was lifelong outstanding athlete and has been having cognitive decline since breast cancer chemo  -following with neurologist Dr. Millan  -no longer on memantine    Diet: N.p.o. (failed nursing eval)  DVT Prophylaxis: No anticoagulation status post TNK, consider Lovenox/SCDs on day 2  Code Status: Full code   Case Discussed With: ED provider  Additional Sources Reviewed: Neurology notes    Anticipated Length of Stay (LOS): Patient will require 2+ midnights for strokelike symptoms, neurology consult     Emmy Wong PA-C    Dragon dictation software was used to dictate this note and thus there may be minor errors in translation/transcription including garbled speech or misspellings. Please contact for clarification if needed.

## 2025-01-27 NOTE — PROGRESS NOTES
Communication Note    Patient Name: Marli Canales  MRN: [id number]  Today's Date: 1/27/2025   Room: 03/03-A    Discipline: Occupational Therapy      Missed Visit Reason:  (Per MD and RN, [facility] pending and may transition towards comfort given high NIH. Will D/C OT orders at this time.)      01/27/25 at 8:52 AM   Tatiana Jules, OT   Rehab Office: [phone]

## 2025-01-27 NOTE — PROGRESS NOTES
Communication Note    Patient Name: Marli Canales  MRN: [id number]  Today's Date: 1/27/2025   Room: 03/03-A    Discipline: Physical Therapy      PT Missed Visit:  (No)  Missed Visit Reason:  (Discussed in mobility rounds: Pt with NIHSS of 40. Due to devastating neurologic injury, PT to d/c orders.)      01/27/25 at 9:01 AM   Monika He, PT   Rehab Office: [phone]

## 2025-01-27 NOTE — PROGRESS NOTES
Social Work Discharge Planning note:    -Patient discussed during interdisciplinary rounds.   -Team members present: Fellow, PT and OT, and SW  -Plan per medical team: Pt is not medically ready for discharge. GOC discussion to be held with family.  -Payer: [facility] Medicare  -Status: Inpatient  -Discharge disposition: TBD - GOC discussion with medical team and family.   -Support to Pt/family: SW met with Pt's , Nito, to offer support. Nito reported no other issues or concerns at this time. SW will continue to follow for emotional/incidental support to Pt/family.    -Potential Barriers: none  -Anticipated Date of Discharge:  1/31/25    This SW   ADIA Martinez, LSW    Office: [phone]  Secure chat via Haiku

## 2025-01-27 NOTE — CONSULTS
Critical Care Medicine Consult      Reason For Consult  Suspected acute ischemic stroke s/p TNK administration     History Of Present Illness  Marli Canales is a 84 y.o. female with a past medical history of remote breast cancer s/p chemotherapy and right mastectomy, cognitive decline (age related as well as due to chemotherapy), HTN, HLD, admitted to the ICU for suspected acute ischemic stroke post TNK administration. Patient presented to the hospital for stroke like symptoms (slurred speech, show asymmetry, weakness of left leg, and lethargy). Patient was home watching a football game when  noticed patient appeared more lethargic.   reports patient has been weak for many years but is able to walk without assistance.   reportedly tried to help patient to bathroom around 7pm however patient had difficulty standing. He noticed patient was having slurred speech and left leg weakness prompting him to bring patient to hospital to be evaluated. On initial presentation, patient had noted left sided weakness, facial droop, and slurred speech. Initial NIH 3. Vitals with temp 97.3, heart rate 80, respirations 17, /110, and oxygen saturation 95% on room air. Labs obtained with CBC with WBC 12.3, hemoglobin 15.1, hematocrit 45.3.  Chemistry with glucose 124, BUN 38, creatinine 1.69.  Troponin 40. EKG with sinus rhythm, rate 74, no acute ST elevation. CT head showed no acute ischemic changes or hemorrhage. CTA head and neck with no large vessel occlusion or severe stenosis. After ED discussed case with on call stroke neurology, patient deemed candidate for tenecteplase. Patient received tenecteplase at 2032.  Patient with minimal improvement post tenecteplase.  Continues with severe aphasia/dysarthria, lower extremity weakness.  Admitted to the ICU for further management.    Past Medical History:   Diagnosis Date    Personal history of other diseases of the respiratory system     History of  asthma     Past Surgical History:   Procedure Laterality Date    BREAST LUMPECTOMY  10/05/2015    Breast Surgery Lumpectomy    MR HEAD ANGIO WO IV CONTRAST  2017    MR HEAD ANGIO WO IV CONTRAST [facility]    OTHER SURGICAL HISTORY  2014    Breast Surgery Reconstruction    TOTAL KNEE ARTHROPLASTY  2014    Knee Replacement     (Not in a hospital admission)    Patient has no known allergies.  Social History     Tobacco Use    Smoking status: Never    Smokeless tobacco: Never   Vaping Use    Vaping status: Never Used   Substance Use Topics    Alcohol use: Yes     Alcohol/week: 7.0 standard drinks of alcohol     Types: 7 Glasses of wine per week     Comment: glass of wine with dinner daily    Drug use: Never     Family History   Problem Relation Name Age of Onset    Breast cancer Mother      Other (Suicide) Mother           at 46    Heart attack Father           in 80s    Throat cancer Father      Breast cancer Sister           at 47    No Known Problems Brother         Scheduled Medications:   atorvastatin, 40 mg, oral, Nightly  [Held by provider] NIFEdipine ER, 30 mg, oral, BID         Continuous Medications:         PRN Medications:   PRN medications: acetaminophen, hydrALAZINE **FOLLOWED BY** [START ON 2025] hydrALAZINE, labetaloL, ondansetron    Review of Systems:  Review of Systems   Constitutional: Negative.    HENT: Negative.     Eyes: Negative.    Respiratory: Negative.     Cardiovascular: Negative.    Gastrointestinal: Negative.    Endocrine: Negative.    Genitourinary: Negative.    Musculoskeletal:  Positive for gait problem.   Skin: Negative.    Neurological:  Positive for facial asymmetry, speech difficulty and weakness.   Hematological: Negative.    Psychiatric/Behavioral: Negative.          Objective   Vitals:  Most Recent:  Vitals:    25 2200   BP: (!) 152/107   Pulse: 92   Resp: (!) 26   Temp:    SpO2: (!) 90%       24hr Min/Max:  Temp  Min: 36.3 °C (97.3  °F)  Max: 36.3 °C (97.3 °F)  Pulse  Min: 74  Max: 92  BP  Min: 152/107  Max: 160/104  Resp  Min: 16  Max: 29  SpO2  Min: 84 %  Max: 95 %    LDA:          Vent settings:       Hemodynamic parameters for last 24 hours:       No intake or output data in the 24 hours ending 01/26/25 0542        Physical exam:    Physical Exam  Constitutional:       General: She is not in acute distress.  HENT:      Head: Normocephalic.   Eyes:      Pupils: Pupils are equal, round, and reactive to light.   Cardiovascular:      Rate and Rhythm: Normal rate and regular rhythm.      Pulses: Normal pulses.      Heart sounds: Normal heart sounds. No murmur heard.  Pulmonary:      Effort: Pulmonary effort is normal. No respiratory distress.      Breath sounds: Normal breath sounds.   Abdominal:      General: Bowel sounds are normal.      Palpations: Abdomen is soft.   Musculoskeletal:      Right lower leg: No edema.      Left lower leg: No edema.   Skin:     General: Skin is warm.      Capillary Refill: Capillary refill takes less than 2 seconds.   Neurological:      Mental Status: She is alert.      Cranial Nerves: Dysarthria present.      Motor: Weakness present.          Lab/Radiology/Diagnostic Review:  Results for orders placed or performed during the hospital encounter of 01/26/25 (from the past 24 hours)   POCT GLUCOSE   Result Value Ref Range    POCT Glucose 103 (H) 74 - 99 mg/dL   Comprehensive metabolic panel   Result Value Ref Range    Glucose 124 (H) 74 - 99 mg/dL    Sodium 137 136 - 145 mmol/L    Potassium 4.0 3.5 - 5.3 mmol/L    Chloride 104 98 - 107 mmol/L    Bicarbonate 25 21 - 32 mmol/L    Anion Gap 12 10 - 20 mmol/L    Urea Nitrogen 38 (H) 6 - 23 mg/dL    Creatinine 1.69 (H) 0.50 - 1.05 mg/dL    eGFR 30 (L) >60 mL/min/1.73m*2    Calcium 10.2 8.6 - 10.3 mg/dL    Albumin 3.4 3.4 - 5.0 g/dL    Alkaline Phosphatase 105 33 - 136 U/L    Total Protein 6.1 (L) 6.4 - 8.2 g/dL    AST 27 9 - 39 U/L    Bilirubin, Total 0.4 0.0 - 1.2  mg/dL    ALT 37 7 - 45 U/L   Troponin I, High Sensitivity   Result Value Ref Range    Troponin I, High Sensitivity 40 (H) 0 - 13 ng/L   CBC and Auto Differential   Result Value Ref Range    WBC 12.3 (H) 4.4 - 11.3 x10*3/uL    nRBC 0.0 0.0 - 0.0 /100 WBCs    RBC 4.75 4.00 - 5.20 x10*6/uL    Hemoglobin 15.1 12.0 - 16.0 g/dL    Hematocrit 45.3 36.0 - 46.0 %    MCV 95 80 - 100 fL    MCH 31.8 26.0 - 34.0 pg    MCHC 33.3 32.0 - 36.0 g/dL    RDW 13.8 11.5 - 14.5 %    Platelets 306 150 - 450 x10*3/uL    Neutrophils % 81.3 40.0 - 80.0 %    Immature Granulocytes %, Automated 0.3 0.0 - 0.9 %    Lymphocytes % 11.0 13.0 - 44.0 %    Monocytes % 6.4 2.0 - 10.0 %    Eosinophils % 0.7 0.0 - 6.0 %    Basophils % 0.3 0.0 - 2.0 %    Neutrophils Absolute 9.99 (H) 1.60 - 5.50 x10*3/uL    Immature Granulocytes Absolute, Automated 0.04 0.00 - 0.50 x10*3/uL    Lymphocytes Absolute 1.35 0.80 - 3.00 x10*3/uL    Monocytes Absolute 0.78 0.05 - 0.80 x10*3/uL    Eosinophils Absolute 0.08 0.00 - 0.40 x10*3/uL    Basophils Absolute 0.04 0.00 - 0.10 x10*3/uL   Morphology   Result Value Ref Range    RBC Morphology No significant RBC morphology present      CT brain attack angio head and neck W and WO IV contrast    Result Date: 1/26/2025  Interpreted By:  Rojelio Stout, STUDY: CT BRAIN ATTACK ANGIO HEAD AND NECK W AND WO IV CONTRAST;  1/26/2025 8:21 pm   INDICATION: Signs/Symptoms:left sided weakness.     COMPARISON: Correlation made to noncontrast head CT of 01/26/2025.   ACCESSION NUMBER(S): [id number]   ORDERING CLINICIAN: ELPIDIO HEBERT   TECHNIQUE: Unenhanced CT images of the head were obtained. Subsequently, 100 ML of Omnipaque 350 was administered intravenously and axial images of the head and neck were acquired.  Coronal, sagittal, and 3-D reconstructions were provided for review.   FINDINGS:   Evaluation of the neck arteries, particularly in the suprahyoid neck, is limited by extensive artifact from dental amalgam and dense IV  contrast in the venous structures.   CTA HEAD FINDINGS:   Anterior circulation: The bilateral intracranial internal carotid arteries, bilateral carotid terminals, bilateral proximal anterior and middle cerebral arteries are normal.   Posterior circulation: Bilateral intracranial vertebral arteries, vertebrobasilar junction, basilar artery and proximal posterior cerebral arteries are normal.   No intracranial saccular aneurysm or other definite abnormal enhancement is seen.   CTA NECK FINDINGS:   Evaluation of carotid bifurcations and cervical ICAs is limited by artifact.   Carotid vessels: The common carotid artery is normal. The carotid bifurcation is normal. The internal carotid artery in the neck is normal. 0% ICA narrowing by NASCET criteria.   Left carotid vessels: The common carotid artery is normal. The carotid bifurcation is normal. The internal carotid artery in the neck is normal. 0% ICA narrowing by NASCET criteria.   Vertebral vessels:  The visualized segments of the cervical vertebral arteries are normal in caliber.   There is a soft tissue mass involving the left palatine tonsil, measuring up to 2.7 x 1.9 cm in transaxial diameters, 2.1 cm in CC diameter (series 12, image 79; series 15, image 24), highly suspicious for squamous cell carcinoma ENT evaluation for direct visualization and histopathologic correlation recommended.       Evaluation of the neck arteries, particularly in the suprahyoid neck, is limited by extensive artifact from dental amalgam and dense IV contrast in the venous structures.   No evidence for definite significant stenosis of the cervical vessels. Evaluation of the carotid bifurcations and ICAs is limited by artifact.   No evidence for significant stenosis or large branch vessel cutoffs of the intracranial vessels.   There is a soft tissue mass involving the left palatine tonsil, measuring up to 2.7 x 1.9 cm in transaxial diameters, 2.1 cm in CC diameter (series 12, image 79;  series 15, image 24), highly suspicious for squamous cell carcinoma ENT evaluation for direct visualization and histopathologic correlation recommended.   MACRO: Rojelio Girish discussed the significance and urgency of this critical finding by epic secure chat with  ELPIDIO HEBERT on 1/26/2025 at 8:37 pm.  (**-RCF-**) Findings:  See findings.   Signed by: Rojelio Stout 1/26/2025 8:38 PM Dictation workstation:   [id number]    CT brain attack head wo IV contrast    Result Date: 1/26/2025  Interpreted By:  Reji Solorio, STUDY: CT BRAIN ATTACK HEAD WO IV CONTRAST;  1/26/2025 8:07 pm   INDICATION: Signs/Symptoms:Stroke Evaluation.  Left-sided weakness, left paralysis.   COMPARISON: CT head 08/19/2023.   ACCESSION NUMBER(S): [id number]   ORDERING CLINICIAN: ELPIDIO HEBERT   TECHNIQUE: Noncontrast axial CT scan of head was performed.   FINDINGS: Parenchyma: No evidence of acute large vessel territory infarct. No acute intracranial hemorrhage. No mass effect or midline shift.Similar confluent periventricular white matter hypodensities, likely chronic microvascular ischemic change.Similar moderate parenchymal atrophy.   CSF Spaces: The ventricles, sulci and basal cisterns are proportional to the degree of parenchymal volume loss.   Extra-Axial Fluid: None.   Calvarium/bone: No acute depressed calvarial fracture. Unchanged remote nasal bone fractures.   Paranasal sinuses: There is opacification of the visualized left maxillary sinus with internal air bubbles.   Mastoids: Clear.   Orbits: No acute abnormality.   Soft tissues: Unremarkable.       No CT evidence of acute large vessel territory infarct or intracranial hemorrhage. Please correlate with the separately reported CT angiogram.   Similar chronic microvascular change and parenchymal atrophy.   MACRO: Reji Solorio discussed the significance and urgency of this critical finding by Epic secure chat with  ELPIDIO HEBERT on 1/26/2025 at 8:15 pm.  (**-RCF-**)  Findings:  See findings.   Signed by: Reji Solorio 1/26/2025 8:16 PM Dictation workstation:   [id number]      Assessment/Plan   Assessment & Plan  Left-sided weakness    Marli Canales is a 84 y.o. female with a past medical history of remote breast cancer s/p chemotherapy and right mastectomy, cognitive decline (age related as well as due to chemotherapy), HTN, HLD, admitted to the ICU for suspected acute ischemic stroke post TNK administration.    Suspected acute ischemic stroke s/p TNK administration  -Presented with strokelike symptoms (slurred speech, facial asymmetry, lower extremity weakness, lethargy)  -LKW 1900,  was taking patient to bathroom however patient had difficulty standing with noted left leg weakness and slurred speech  -Initial NIH 3  -CT brain attack imaging no acute ischemic changes or hemorrhage  -CTA head and neck with no significant stenosis or large vessel occlusion  -ED discussed case with on-call stroke neurology with recommendations for tenecteplase  -Patient received tenecteplase at 2032  -Patient with minimal improvement post tenecteplase.  Continues with severe aphasia/dysarthria, lower extremity weakness. NIH 3  -Low threshold to obtain STAT non-contrast head CT and contact neurology/neurosurgery if worsening neurologic status  -Continue neurochecks per protocol  -NPO until evaluated by Speech Pathology  -PT/OT  -Hemodynamic monitoring with vitals every 1 hour; target goal SBP <180/105, PRN antihypertensives as indicated. Avoid hypotension  -Plan for follow up MRI/CT tomorrow 24 hours post TNK administration  -Consult neurology, appreciate recommendations  -High intesity statin  -Hold aspirin until tomorrow  -Check Lipid panel    HTN, HLD  -Hemodynamic monitoring with vitals every 1 hour; target goal SBP <180/105, PRN antihypertensives as indicated. Avoid hypotension  -High intensity statin  -Hold aspirin until tomorrow  -TTE with bubble study    JAZMIN  -BUN  38/creatinine 1.69 (baseline around 1)  -Suspect secondary to contrast from imaging, dehydration  -Monitor intake and output  -Avoid nephrotoxins  -Follow BMP    Hyperglycemia  -Accu-Cheks every 4 hours while NPO with sliding scale insulin as indicated  -Goal serum glucose of 140-180  -Hypoglycemia protocol  -Check A1c    5.  Elevated troponin  -Suspect demand secondary to above  -HST 40  -EKG with sinus rhythm, rate 74, no acute ST elevation  -Continuous telemetry monitoring  -Monitor electrolytes, replace as indicated    I spent 45 minutes of cumulative critical care time with the patient.  Greater than 50% of that time was spent in the direct collaboration and or coordination of care of the patient.     Dragon dictation software was used to dictate this note and thus there may be minor errors in translation/transcription including garbled speech or misspellings. Please contact for clarification if needed.

## 2025-01-27 NOTE — SIGNIFICANT EVENT
Stroke accepted transfer of care from neurosurgery team.    Radha (Gene) MD [place]  Neurology Resident, PGY4

## 2025-01-27 NOTE — CONSULTS
Inpatient consult to Neurology  Consult performed by: Rory Arora MD  Consult ordered by: Doe uQiroz MD      History Of Present Illness  Marli Canales is a 84 y.o. 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 pm 1/26/2025 with left sided weakness and left sided facial droop.  LKW was around 19:00.  Per chart review patient was with  at home who noticed she was having increased difficulty walking to the bathroom with an abnormal gait, he had to help her carry her weight to and from the bathroom.  After sitting her down I a chair she was unable to get up, and he noticed slurred speech and facial droop.  He brought her to the ED.  Upon evaluation on arrival she had an NIHSS 3 (LLE weakness and slurred speech).  She follows with Dr. Anna Suresh of Neurology for management of chemotherapy related cognitive impairment.  Also sees Dr. Sergio Millan (last 9/2024), MOCA at that time 20/30.  At baseline she is independent with ADLs but husbands assists with mobility.  Baseline MRS 2.    Hospital course at [facility] per discharge summary note of Dr. Salcedo:    This is an 85 y/o F admitted for stroke-like symptoms. CT head on arrival showed no evidence of intracranial hemorrhage or large vessel occlusion. Given onset of symptoms,gave 15 mg of TNK at 20:32 for stroke and admitted to ICU. TXA was applied to bleeding gums. All anticoagulants and antiplatelets were due to TNK being given. Initial ischemic stroke permissive hypertension was allowed. Pt showed minimal improvement post-tenecteplase and continued to demonstrate severe aphasia/dysarthria and lower extremity weakness. On neurologic reassessment several hours later pt showed anisocoria with L pupil greater than R. Stat CT head was ordered,  showing a large acute hemorrhage involving the isreal midbrain extending into the L middle cerebral peduncle and L middle cerebellar peduncle. Discussed with neurosurgery at [facility] and pt was transferred to higher level of care. Fibrinogen and INR were checked and were both WNL. Cryoprecipitate was not given. Pt was electively intubated for airway protection by transport before transfer. Pt transferred in serious, but stable condition. Vital signs stable.     Last known well: 19:00 1/26/2025  Had stroke symptoms resolved at time of presentation: No  Past Medical History  Past Medical History:   Diagnosis Date    Personal history of other diseases of the respiratory system     History of asthma     Surgical History  Past Surgical History:   Procedure Laterality Date    BREAST LUMPECTOMY  10/05/2015    Breast Surgery Lumpectomy    MR HEAD ANGIO WO IV CONTRAST  12/5/2017    MR HEAD ANGIO WO IV CONTRAST [facility]    OTHER SURGICAL HISTORY  11/20/2014    Breast Surgery Reconstruction    TOTAL KNEE ARTHROPLASTY  11/20/2014    Knee Replacement     Social History  Social History     Tobacco Use    Smoking status: Never    Smokeless tobacco: Never   Vaping Use    Vaping status: Never Used   Substance Use Topics    Alcohol use: Yes     Alcohol/week: 7.0 standard drinks of alcohol     Types: 7 Glasses of wine per week     Comment: glass of wine with dinner daily    Drug use: Never     Allergies  Patient has no known allergies.  Home Medications  Medications Prior to Admission   Medication Sig Dispense Refill Last Dose/Taking    acetaminophen-codeine (Tylenol w/ Codeine #3) 300-30 mg tablet Take 1 tablet by mouth once daily at bedtime. 30 tablet 0     ascorbic acid (Vitamin C) 1,000 mg tablet Take 0.5 tablets (500 mg) by mouth once daily.       benzocaine 15 mg lozenge Place 1 lozenge into mouth between cheek and gum every 2 hours if needed.       donepezil (Aricept) 10 mg tablet Take 1 tablet (10 mg) by mouth once daily  at bedtime.       HYDROcodone-acetaminophen (Norco) 5-325 mg tablet (Schedule II Drug) TK 1 T PO Q 4 TO 6 H PRF PAIN       meloxicam (Mobic) 15 mg tablet TAKE 1 TABLET BY MOUTH ONCE  DAILY WITH FOOD AS NEEDED 100 tablet 2     memantine (Namenda) 10 mg tablet Take 1 tablet (10 mg) by mouth 2 times a day. 240 tablet 2     mirabegron (Mybetriq) 25 mg tablet extended release 24 hr 24 hr tablet Take 12.5 mg by mouth once daily. 12.5 twice daily       mirabegron (Myrbetriq) 50 mg tablet extended release 24 hr 24 hr tablet Take 1 tablet (50 mg) by mouth once daily. (Patient not taking: Reported on 6/24/2024) 90 tablet 2     NIFEdipine ER (NIFEdipine CC) 30 mg 24 hr tablet Take 1 tablet (30 mg) by mouth 2 times a day. 200 tablet 1     potassium citrate 99 mg capsule Take 2 tablets by mouth 2 times a day.       zinc gluconate 50 mg tablet once every 24 hours.        Review of Systems:  Review of systems negative where not otherwise noted in HPI    Neurological Exam    NIHSS 29 (2 LOC, 2 LOCQ, 2 LOCC, 0 Gaze, 0 VF, 0 Face, 4 LUE, 4 RUE, 4 LLE, 4 RLE, 2 Dysarthia, 3 Language, 0 Ataxia, 2 Sensory, 0 Neglect)    GENERAL APPEARANCE:  Intubated, sedated on propofol and fentanyl, both held for examination.    MENTAL STATE: Comatose, patient does not arouse to vocal, physical or nox stim.  Does not follow commands.    CRANIAL NERVES:   CN II, III, IV, VI: Anisocoria present: R pupil 1mm, L pupil 4mm, both pupils non-responsive to light.  Gaze midline.  No blink to threat response.  CN V: Corneal reflex weakly present on right, absent on left.  CN VII: Face appears symmetric (within limits of exam/intubation)  CN VIII: Unable to assess  CN IX, X: Cough reflex intact  CN XI: Unable to assess  CN XII: Tongue with leftward deviation.    MOTOR:     RUE/LUE: Flaccid, no withdrawal or flicker to nox stim (nailbed pressure).    RLE/LLE: Flaccid, dorsiflexes ankle to nox stim (nailbed pressure, pinch above knee).    REFLEXES:   RIGHT  "UE  LEFT UE   BR:2   BR:2     Biceps:2  Biceps:2     Triceps:2   Triceps:2       RIGHT LLE  LEFT LLE     Patellar:1  Patellar:0     Ankle:0   Ankle:0       Babinski: Toes upgoing to plantar stimulation bilaterally. Negative Price.     SENSORY: Does not grimace to nox stim.  No withdrawal in BUE, minimal ankle dorsiflexion in BLE to nox stim (nailbed pressure and pinch above knee)    COORDINATION: Unable to assess    GAIT: Unable to assess      Last Recorded Vitals  Blood pressure 122/82, pulse 53, temperature 35.6 °C (96.1 °F), temperature source Temporal, resp. rate 14, height 1.575 m (5' 2.01\"), weight 60.6 kg (133 lb 9.6 oz), SpO2 100%.        Relevant Results  Scheduled medications  acetaminophen-codeine, 1 tablet, oral, Nightly  ascorbic acid, 500 mg, oral, Daily  atropine, , ,   donepezil, 10 mg, oral, Nightly  memantine, 10 mg, oral, BID  NIFEdipine ER, 30 mg, oral, BID  oxybutynin, 5 mg, oral, TID  polyethylene glycol, 17 g, oral, Daily  sennosides-docusate sodium, 2 tablet, oral, BID    Continuous medications  fentaNYL, 0-300 mcg/hr, Last Rate: 25 mcg/hr (01/27/25 0359)  niCARdipine, 1-15 mg/hr  propofol, 5-50 mcg/kg/min, Last Rate: 10 mcg/kg/min (01/27/25 0358)    PRN medications  PRN medications: atropine, fentaNYL, hydrALAZINE, labetaloL, niCARdipine, oxygen, oxygen    Results for orders placed or performed during the hospital encounter of 01/27/25 (from the past 24 hours)   Coagulation Screen   Result Value Ref Range    Protime 12.3 9.8 - 12.8 seconds    INR 1.1 0.9 - 1.1    aPTT 21 (L) 27 - 38 seconds   Renal Function Panel   Result Value Ref Range    Glucose 144 (H) 74 - 99 mg/dL    Sodium 144 136 - 145 mmol/L    Potassium 4.7 3.5 - 5.3 mmol/L    Chloride 111 (H) 98 - 107 mmol/L    Bicarbonate 22 21 - 32 mmol/L    Anion Gap 16 10 - 20 mmol/L    Urea Nitrogen 39 (H) 6 - 23 mg/dL    Creatinine 1.47 (H) 0.50 - 1.05 mg/dL    eGFR 35 (L) >60 mL/min/1.73m*2    Calcium 10.1 8.6 - 10.6 mg/dL    Phosphorus " 3.8 2.5 - 4.9 mg/dL    Albumin 3.5 3.4 - 5.0 g/dL   CBC   Result Value Ref Range    WBC 8.5 4.4 - 11.3 x10*3/uL    nRBC 0.0 0.0 - 0.0 /100 WBCs    RBC 4.07 4.00 - 5.20 x10*6/uL    Hemoglobin 12.8 12.0 - 16.0 g/dL    Hematocrit 39.0 36.0 - 46.0 %    MCV 96 80 - 100 fL    MCH 31.4 26.0 - 34.0 pg    MCHC 32.8 32.0 - 36.0 g/dL    RDW 13.6 11.5 - 14.5 %    Platelets 213 150 - 450 x10*3/uL   Magnesium   Result Value Ref Range    Magnesium 2.23 1.60 - 2.40 mg/dL   Troponin I, High Sensitivity   Result Value Ref Range    Troponin I, High Sensitivity (CMC) 41 (H) 0 - 34 ng/L   Hemoglobin A1C   Result Value Ref Range    Hemoglobin A1C 5.4 See comment %    Estimated Average Glucose 108 Not Established mg/dL   Lipid Panel   Result Value Ref Range    Cholesterol 165 0 - 199 mg/dL    HDL-Cholesterol 42.0 mg/dL    Cholesterol/HDL Ratio 3.9     LDL Calculated 98 <=99 mg/dL    VLDL 25 0 - 40 mg/dL    Triglycerides 125 0 - 149 mg/dL    Non HDL Cholesterol 123 0 - 149 mg/dL   B-Type Natriuretic Peptide   Result Value Ref Range    BNP 58 0 - 99 pg/mL   POCT GLUCOSE   Result Value Ref Range    POCT Glucose 136 (H) 74 - 99 mg/dL         [place] Coma Scale  Best Eye Response: None  Best Verbal Response: None  Best Motor Response: Flexion to pain  [place] Coma Scale Score: 5                No MRI head results found for the past 14 days  CT head wo IV contrast    Result Date: 1/27/2025  Interpreted By:  Wilmer Presley, STUDY: CT HEAD WO IV CONTRAST;  1/27/2025 12:01 am   INDICATION: Signs/Symptoms:aphasia, unequal pupils. post TNK. r/o bleed.     COMPARISON: 01/26/2025 CTA head   ACCESSION NUMBER(S): [id number]   ORDERING CLINICIAN: SALVADOR WEAVER   TECHNIQUE: Noncontrast axial CT images of head were obtained with coronal and sagittal reconstructed images.   FINDINGS: BRAIN PARENCHYMA: There is acute hemorrhage involving the isreal and midbrain extending into the left middle cerebral peduncle and left middle cerebellar peduncle. The  midbrain/isreal hematoma measures ~ 3.4 x 3.1 x 2.1 cm. The left brachium pontis hematoma measures ~ 2 cm x 0.9 cm x 0.7 cm. These hematomas are atypically hyperdense secondary to persistent contrast within the intracranial vascular system and represent extravasation of contrast opacified blood into the parenchyma. Severe periventricular and subcortical hemispheric white matter hypodensities are most compatible with chronic small vessel ischemic disease.   VENTRICLES and EXTRA-AXIAL SPACES:  No acute extra-axial or intraventricular hemorrhage. No effacement of cerebral sulci. The ventricles and sulci are age-concordant.   PARANASAL SINUSES/MASTOIDS: Trace mucus in the left maxillary sinus. No hemorrhage or air-fluid levels within the visualized paranasal sinuses. The mastoids are well aerated.   CALVARIUM/ORBITS:  No skull fracture.  The orbits and globes are intact to the extent visualized.   EXTRACRANIAL SOFT TISSUES: No discernible abnormality.       Large acute hemorrhage involving the isreal and midbrain extending into the left middle cerebral peduncle and left middle cerebellar peduncle as detailed above.     MACRO: Wilmer Presley discussed the significance and urgency of this critical finding by telephone with  SALVADOR WEAVER on 1/27/2025 at 12:16 am.  (**-RCF-**) Findings:  See findings.   Signed by: Wilmer Presley 1/27/2025 12:19 AM Dictation workstation:   [id number]    CT brain attack head wo IV contrast    Result Date: 1/26/2025  Interpreted By:  Reji Solorio, STUDY: CT BRAIN ATTACK HEAD WO IV CONTRAST;  1/26/2025 8:07 pm   INDICATION: Signs/Symptoms:Stroke Evaluation.  Left-sided weakness, left paralysis.   COMPARISON: CT head 08/19/2023.   ACCESSION NUMBER(S): [id number]   ORDERING CLINICIAN: ELPIDIO HEBERT   TECHNIQUE: Noncontrast axial CT scan of head was performed.   FINDINGS: Parenchyma: No evidence of acute large vessel territory infarct. No acute intracranial hemorrhage. No mass effect  or midline shift.Similar confluent periventricular white matter hypodensities, likely chronic microvascular ischemic change.Similar moderate parenchymal atrophy.   CSF Spaces: The ventricles, sulci and basal cisterns are proportional to the degree of parenchymal volume loss.   Extra-Axial Fluid: None.   Calvarium/bone: No acute depressed calvarial fracture. Unchanged remote nasal bone fractures.   Paranasal sinuses: There is opacification of the visualized left maxillary sinus with internal air bubbles.   Mastoids: Clear.   Orbits: No acute abnormality.   Soft tissues: Unremarkable.       No CT evidence of acute large vessel territory infarct or intracranial hemorrhage. Please correlate with the separately reported CT angiogram.   Similar chronic microvascular change and parenchymal atrophy.   MACRO: Reji Solorio discussed the significance and urgency of this critical finding by Epic secure chat with  ELPIDIO HEBERT on 1/26/2025 at 8:15 pm.  (**-RCF-**) Findings:  See findings.   Signed by: Reji Solorio 1/26/2025 8:16 PM Dictation workstation:   [id number]   No echocardiogram results found for the past 14 days      Results from last 7 days   Lab Units 01/27/25  0406   HEMOGLOBIN A1C % 5.4     BNP   Date/Time Value Ref Range Status   01/27/2025 04:06 AM 58 0 - 99 pg/mL Final        I have personally reviewed the following imaging results CT head wo IV contrast    Result Date: 1/27/2025  Interpreted By:  Wilmer Presley, STUDY: CT HEAD WO IV CONTRAST;  1/27/2025 12:01 am   INDICATION: Signs/Symptoms:aphasia, unequal pupils. post TNK. r/o bleed.     COMPARISON: 01/26/2025 CTA head   ACCESSION NUMBER(S): [id number]   ORDERING CLINICIAN: SALVADOR WEAVER   TECHNIQUE: Noncontrast axial CT images of head were obtained with coronal and sagittal reconstructed images.   FINDINGS: BRAIN PARENCHYMA: There is acute hemorrhage involving the isreal and midbrain extending into the left middle cerebral peduncle and left  middle cerebellar peduncle. The midbrain/isreal hematoma measures ~ 3.4 x 3.1 x 2.1 cm. The left brachium pontis hematoma measures ~ 2 cm x 0.9 cm x 0.7 cm. These hematomas are atypically hyperdense secondary to persistent contrast within the intracranial vascular system and represent extravasation of contrast opacified blood into the parenchyma. Severe periventricular and subcortical hemispheric white matter hypodensities are most compatible with chronic small vessel ischemic disease.   VENTRICLES and EXTRA-AXIAL SPACES:  No acute extra-axial or intraventricular hemorrhage. No effacement of cerebral sulci. The ventricles and sulci are age-concordant.   PARANASAL SINUSES/MASTOIDS: Trace mucus in the left maxillary sinus. No hemorrhage or air-fluid levels within the visualized paranasal sinuses. The mastoids are well aerated.   CALVARIUM/ORBITS:  No skull fracture.  The orbits and globes are intact to the extent visualized.   EXTRACRANIAL SOFT TISSUES: No discernible abnormality.       Large acute hemorrhage involving the isreal and midbrain extending into the left middle cerebral peduncle and left middle cerebellar peduncle as detailed above.     MACRO: Wilmer Presley discussed the significance and urgency of this critical finding by telephone with  SALVADOR WEAVER on 1/27/2025 at 12:16 am.  (**-RCF-**) Findings:  See findings.   Signed by: Wilmer Presley 1/27/2025 12:19 AM Dictation workstation:   [id number]    CT brain attack angio head and neck W and WO IV contrast    Result Date: 1/26/2025  Interpreted By:  Rojelio Stout, STUDY: CT BRAIN ATTACK ANGIO HEAD AND NECK W AND WO IV CONTRAST;  1/26/2025 8:21 pm   INDICATION: Signs/Symptoms:left sided weakness.     COMPARISON: Correlation made to noncontrast head CT of 01/26/2025.   ACCESSION NUMBER(S): [id number]   ORDERING CLINICIAN: ELPIDIO HEBERT   TECHNIQUE: Unenhanced CT images of the head were obtained. Subsequently, 100 ML of Omnipaque 350 was  administered intravenously and axial images of the head and neck were acquired.  Coronal, sagittal, and 3-D reconstructions were provided for review.   FINDINGS:   Evaluation of the neck arteries, particularly in the suprahyoid neck, is limited by extensive artifact from dental amalgam and dense IV contrast in the venous structures.   CTA HEAD FINDINGS:   Anterior circulation: The bilateral intracranial internal carotid arteries, bilateral carotid terminals, bilateral proximal anterior and middle cerebral arteries are normal.   Posterior circulation: Bilateral intracranial vertebral arteries, vertebrobasilar junction, basilar artery and proximal posterior cerebral arteries are normal.   No intracranial saccular aneurysm or other definite abnormal enhancement is seen.   CTA NECK FINDINGS:   Evaluation of carotid bifurcations and cervical ICAs is limited by artifact.   Carotid vessels: The common carotid artery is normal. The carotid bifurcation is normal. The internal carotid artery in the neck is normal. 0% ICA narrowing by NASCET criteria.   Left carotid vessels: The common carotid artery is normal. The carotid bifurcation is normal. The internal carotid artery in the neck is normal. 0% ICA narrowing by NASCET criteria.   Vertebral vessels:  The visualized segments of the cervical vertebral arteries are normal in caliber.   There is a soft tissue mass involving the left palatine tonsil, measuring up to 2.7 x 1.9 cm in transaxial diameters, 2.1 cm in CC diameter (series 12, image 79; series 15, image 24), highly suspicious for squamous cell carcinoma ENT evaluation for direct visualization and histopathologic correlation recommended.       Evaluation of the neck arteries, particularly in the suprahyoid neck, is limited by extensive artifact from dental amalgam and dense IV contrast in the venous structures.   No evidence for definite significant stenosis of the cervical vessels. Evaluation of the carotid  bifurcations and ICAs is limited by artifact.   No evidence for significant stenosis or large branch vessel cutoffs of the intracranial vessels.   There is a soft tissue mass involving the left palatine tonsil, measuring up to 2.7 x 1.9 cm in transaxial diameters, 2.1 cm in CC diameter (series 12, image 79; series 15, image 24), highly suspicious for squamous cell carcinoma ENT evaluation for direct visualization and histopathologic correlation recommended.   MACRO: Rojelio Stout discussed the significance and urgency of this critical finding by epic secure chat with  ELPIDIO HEBERT on 1/26/2025 at 8:37 pm.  (**-RCF-**) Findings:  See findings.   Signed by: Rojelio Stout 1/26/2025 8:38 PM Dictation workstation:   [id number]    CT brain attack head wo IV contrast    Result Date: 1/26/2025  Interpreted By:  Reji Solorio, STUDY: CT BRAIN ATTACK HEAD WO IV CONTRAST;  1/26/2025 8:07 pm   INDICATION: Signs/Symptoms:Stroke Evaluation.  Left-sided weakness, left paralysis.   COMPARISON: CT head 08/19/2023.   ACCESSION NUMBER(S): [id number]   ORDERING CLINICIAN: ELPIDIO HEBERT   TECHNIQUE: Noncontrast axial CT scan of head was performed.   FINDINGS: Parenchyma: No evidence of acute large vessel territory infarct. No acute intracranial hemorrhage. No mass effect or midline shift.Similar confluent periventricular white matter hypodensities, likely chronic microvascular ischemic change.Similar moderate parenchymal atrophy.   CSF Spaces: The ventricles, sulci and basal cisterns are proportional to the degree of parenchymal volume loss.   Extra-Axial Fluid: None.   Calvarium/bone: No acute depressed calvarial fracture. Unchanged remote nasal bone fractures.   Paranasal sinuses: There is opacification of the visualized left maxillary sinus with internal air bubbles.   Mastoids: Clear.   Orbits: No acute abnormality.   Soft tissues: Unremarkable.       No CT evidence of acute large vessel territory infarct or  intracranial hemorrhage. Please correlate with the separately reported CT angiogram.   Similar chronic microvascular change and parenchymal atrophy.   MACRO: Reji Solorio discussed the significance and urgency of this critical finding by Epic secure chat with  ELPIDIO HEBERT on 1/26/2025 at 8:15 pm.  (**-RCF-**) Findings:  See findings.   Signed by: Reji Solorio 1/26/2025 8:16 PM Dictation workstation:   [id number].       IV Thrombolysis IV Thrombolysis Checklist      IV Thrombolysis Given: Yes Thrombolysis Administration: prior to arrival       Assessment/Plan   Assessment & Plan  Intracerebral hemorrhage, nontraumatic (Multi)      Type: Ischemic stroke -> post-TNK ICH  Subtype/etiology: Presumed small vessel disease  Vessels involved: Lenticulostriate (possible)  Neurological manifestations: Left hemibody weakness, left facial droop  NIHSS (worst at presentation): 3   Diagnostic evaluation: CTH, CTA head/neck  Antiplatelet/antithrombotic plan for stroke prevention: N/A  VTE prophylaxis: N/A (post-TNK)  Vascular Risk Factor modification goals:  Blood pressure goals: avoid hypotension SBP <100 that could worsen cerebral perfusion, Inpatient Intracerebral hemorrhage- -150 mmHg (if presenting SBP was 150-220 mmHg)   Lipid Goals: education on healthy diet and statin therapy to maintain or achieve goal LDL-cholesterol < 70mg  Glucose Goals: early treatment of hyperglycemia to goal glucose 140-180 mg/dl with long-term goal A1c < 7%   Smoking Cessation and Education  Assessment for Rehabilitation needs   Patient and family education on signs and symptoms of stroke, calling 911, healthy strategies for stroke prevention.         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  On evaluate by Neurology patient was found to have NIHSS 3 (LLE weakness and dysarthria), CT Head showed no intracrnial abnormality and CTA Head/Neck demonstrated no LVO.  TNK was given at 20:32 and patient was admitted to ICU for post-TNK management.  Patient had gum bleeding that was treated with topical TXA.  On neurological evaluation in the ICU patient was noted to have anisocoria L>R, stat CT head demonstrated large acute hemorrhage involving left thalamus extending into isreal, midbrain, left middle cerebral peduncle and left middle cerebellar peduncle.  Patient subsequently transferred to NSGY at [facility] for higher level of care.      RECOMMENDATIONS:  - -150  - Agree with TTE w/ bubble study in AM  - Remainder of care per NSGY  - Will discuss transfer of care with stroke attending in the morning    Rory Arora MD  Department of Neurology, PGY-2  Stroke Pager [phone]

## 2025-01-27 NOTE — PROGRESS NOTES
Pharmacy Medication History Review    Marli Canales is a 84 y.o. female admitted for Intracerebral hemorrhage, nontraumatic (Multi). Pharmacy reviewed the patient's [id number] medications and allergies for accuracy.    Medications ADDED:  None  Medications CHANGED:  None  Medications REMOVED:   Tylenol with codeine #3  Benzocaine lozenge  Norco  Mybetriq     The list below reflects the updated PTA list.   Prior to Admission Medications   Prescriptions Informant   NIFEdipine ER (NIFEdipine CC) 30 mg 24 hr tablet Other   Sig: Take 1 tablet (30 mg) by mouth 2 times a day.   ascorbic acid (Vitamin C) 1,000 mg tablet Other   Sig: Take 0.5 tablets (500 mg) by mouth once daily.   donepezil (Aricept) 10 mg tablet Other   Sig: Take 1 tablet (10 mg) by mouth once daily at bedtime.   meloxicam (Mobic) 15 mg tablet Other   Sig: TAKE 1 TABLET BY MOUTH ONCE  DAILY WITH FOOD AS NEEDED   Patient taking differently: Take 1 tablet (15 mg) by mouth once daily as needed (pain).   memantine (Namenda) 10 mg tablet Other   Sig: Take 1 tablet (10 mg) by mouth 2 times a day.   potassium citrate 99 mg capsule Other   Sig: Take 2 tablets by mouth 2 times a day.   zinc gluconate 50 mg tablet Other   Sig: Take 1 tablet (50 mg) by mouth once every 24 hours.      Facility-Administered Medications: None        The list below reflects the updated allergy list. Please review each documented allergy for additional clarification and justification.  Allergies  Indicated as Unable to Assess by Brielle Srivastava RN on 1/27/2025 (Patient unable to communicate)   No Known Allergies         Patient was unable to be assessed for M2B at discharge.     Sources:   [facility]  Pharmacy dispense history  Patient interview Unable to provide any details, patient currently intubated  Chart Review  Care Everywhere  9/26/2024  neurology office visit    Additional Comments:  Patient transferred from [facility]  No recent dispense history per [facility] report  Per  "neurology note, patient has suffered a severe neurologic injury (see notes for details). The patient's RN indicated the patient has very minimal reflex responses and patient's family to determine goals of care and code status  Med rec completed per chart review and pharmacy dispense history      Naresh Clark, Lizbeth  Transitions of Care Pharmacist  01/27/25     Secure Chat preferred   If no response call [phone] or Vocera \"Med Rec\"    "

## 2025-01-27 NOTE — SIGNIFICANT EVENT
Patient presenting with acute left sided weakness and left facial droop, negative CTH, received TNK, subsequently had an exam change, found to have a large thalamic, midbrain, and pontine ICH. Given the location of the bleed, no surgical intervention is indicated, which was communicated to the . Stroke/NSU team will now be primary for this patient. DVT prophylaxis okay PBD2. Please page or chat with any questions or concerns. Neurosurgery will sign off.    Jordan Parra MD  Neurosurgery

## 2025-01-27 NOTE — SIGNIFICANT EVENT
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  I have reviewed the patient's laboratory, radiographic, and clinical data.  I have spent 30 minutes providing critical care for the patient.      DESTIN Ferrell M.D.

## 2025-01-27 NOTE — H&P
History Of Present Illness  Marli Canales is a 84 y.o. female with h/o HTN, HLD, hyperparaT, breast cancer s/p R mastectomy, depression/anxiety, frontotemporal demntia, p/w acute L weakness and L FD (LKW 1/26 1900), CTH negative, CTA no LVO, incidental L palatine tonsil mass, s/p TNK, CTH L thalamic and brain stem ICH (<30 ml)    Per , she has been weak for many years but was able to walk without assistant.  Today when she was going to the bathroom her  notes of her abnormal gait and was mostly helping her carry her weight to and from the bathroom. He put her in the chair but she was unable to get up. Later, he notes of her slurred speech and left facial droop and decided to bring her to [facility] ED for further evaluation      Patient's imaging was personally reviewed and notable for CTH L thalamic and brain stem ICH (<30 ml)    Past Medical History  She has a past medical history of Personal history of other diseases of the respiratory system.    Surgical History  She has a past surgical history that includes Total knee arthroplasty (11/20/2014); Other surgical history (11/20/2014); Breast lumpectomy (10/05/2015); and MR angio head wo IV contrast (12/5/2017).     Social History  She reports that she has never smoked. She has never used smokeless tobacco. She reports current alcohol use of about 7.0 standard drinks of alcohol per week. She reports that she does not use drugs.     Allergies  Patient has no known allergies.    Medications  Medications Prior to Admission   Medication Sig Dispense Refill Last Dose/Taking    acetaminophen-codeine (Tylenol w/ Codeine #3) 300-30 mg tablet Take 1 tablet by mouth once daily at bedtime. 30 tablet 0     ascorbic acid (Vitamin C) 1,000 mg tablet Take 0.5 tablets (500 mg) by mouth once daily.       benzocaine 15 mg lozenge Place 1 lozenge into mouth between cheek and gum every 2 hours if needed.       donepezil (Aricept) 10 mg tablet Take 1 tablet (10 mg) by mouth  "once daily at bedtime.       HYDROcodone-acetaminophen (Norco) 5-325 mg tablet (Schedule II Drug) TK 1 T PO Q 4 TO 6 H PRF PAIN       meloxicam (Mobic) 15 mg tablet TAKE 1 TABLET BY MOUTH ONCE  DAILY WITH FOOD AS NEEDED 100 tablet 2     memantine (Namenda) 10 mg tablet Take 1 tablet (10 mg) by mouth 2 times a day. 240 tablet 2     mirabegron (Mybetriq) 25 mg tablet extended release 24 hr 24 hr tablet Take 12.5 mg by mouth once daily. 12.5 twice daily       mirabegron (Myrbetriq) 50 mg tablet extended release 24 hr 24 hr tablet Take 1 tablet (50 mg) by mouth once daily. (Patient not taking: Reported on 6/24/2024) 90 tablet 2     NIFEdipine ER (NIFEdipine CC) 30 mg 24 hr tablet Take 1 tablet (30 mg) by mouth 2 times a day. 200 tablet 1     potassium citrate 99 mg capsule Take 2 tablets by mouth 2 times a day.       zinc gluconate 50 mg tablet once every 24 hours.          Review of Systems   Unable to obtain    Neurological Exam  Physical Exam  Int  ECNS  ODpinpoint, OS6NR  +c/g, -c  BUE flaccid  BLE TF     Last Recorded Vitals  Blood pressure 130/81, pulse 54, temperature 35.6 °C (96.1 °F), temperature source Temporal, resp. rate 14, height 1.575 m (5' 2.01\"), weight 60.6 kg (133 lb 9.6 oz), SpO2 99%.    Relevant Results  Results from last 72 hours   Lab Units 01/27/25  0406 01/26/25  2108   WBC AUTO x10*3/uL 8.5 12.3*   NRBC AUTO /100 WBCs 0.0 0.0   RBC AUTO x10*6/uL 4.07 4.75   HEMOGLOBIN g/dL 12.8 15.1   HEMATOCRIT % 39.0 45.3   MCV fL 96 95   MCH pg 31.4 31.8   MCHC g/dL 32.8 33.3   RDW % 13.6 13.8   PLATELETS AUTO x10*3/uL 213 306      Results from last 72 hours   Lab Units 01/26/25 2025   GLUCOSE mg/dL 124*   SODIUM mmol/L 137   POTASSIUM mmol/L 4.0   CHLORIDE mmol/L 104   CO2 mmol/L 25   ANION GAP mmol/L 12   BUN mg/dL 38*   CREATININE mg/dL 1.69*   EGFR mL/min/1.73m*2 30*   CALCIUM mg/dL 10.2   ALBUMIN g/dL 3.4           SCORES    GCS:   - Motor: 3 - Flexor response (decorticate)  - Verbal: 1 - Makes no " noise  - Eyes: 1 - Does not open eyes  - TOTAL: 5  ICH:  - GCS: +1 (GCS 5-12)  - Age: +1 (Age >/= 80)  - Volume: +0 (ICH volume < 30 mL)  - IVH: +0 (IVH absent)  - Infratentorial: +1 (Infratentorial origin)  - TOTAL: 3  - SIZE: <30 cc        Assessment/Plan   Assessment & Plan  Intracerebral hemorrhage, nontraumatic (Multi)      Marli Canales is a 84 y.o. female with h/o HTN, HLD, hyperparaT, breast cancer s/p R mastectomy, depression/anxiety, frontotemporal demntia, p/w acute L weakness and L FD (LKW 1/26 1900), CTH negative, CTA no LVO, incidental L palatine tonsil mass, s/p TNK, CTH L thalamic and brain stem ICH (<30 ml)    Patient is presenting with large left thalamic and brainstem ICH status post indicated for TIA at OSH.  Unfortunately, patient has devastating neurological injury.  Attempted goals of care /CODE STATUS discussion with patient's spouse.  He stated that he would like to talk to her neurologist first before he makes that decision.     ICH 3     Plan  NSU  -150  Stroke recs  TTE in a.m.  PTOT        Shanda Perez MD  Note authored by resident on neurosurgery team, with all questions or to contact team please page at [phone]

## 2025-01-27 NOTE — CARE PLAN
The clinical goals for the shift include remain hemodynamically stable      Problem: Pain - Adult  Goal: Verbalizes/displays adequate comfort level or baseline comfort level  Outcome: Progressing     Problem: Safety - Adult  Goal: Free from fall injury  Outcome: Progressing     Problem: Discharge Planning  Goal: Discharge to home or other facility with appropriate resources  Outcome: Progressing     Problem: Chronic Conditions and Co-morbidities  Goal: Patient's chronic conditions and co-morbidity symptoms are monitored and maintained or improved  Outcome: Progressing     Problem: Nutrition  Goal: Nutrient intake appropriate for maintaining nutritional needs  Outcome: Progressing     Problem: General Stroke  Goal: Establish a mutual long term goal with patient by discharge  Outcome: Progressing  Goal: Demonstrate improvement in neurological exam throughout the shift  Outcome: Progressing  Goal: Maintain BP within ordered limits throughout shift  Outcome: Progressing  Goal: Participate in treatment (ie., meds, therapy) throughout shift  Outcome: Progressing  Goal: No symptoms of aspiration throughout shift  Outcome: Progressing  Goal: No symptoms of hemorrhage throughout shift  Outcome: Progressing  Goal: Tolerate enteral feeding throughout shift  Outcome: Progressing  Goal: Decreased nausea/vomiting throughout shift  Outcome: Progressing  Goal: Controlled blood glucose throughout shift  Outcome: Progressing  Goal: Out of bed three times today  Outcome: Progressing     Problem: ICU Stroke  Goal: Tolerate ventilator weaning trial during shift  Outcome: Progressing  Goal: Maintain patent airway throughout shift  Outcome: Progressing  Goal: Achieve/maintain targeted sodium level throughout shift  Outcome: Progressing     Problem: Fall/Injury  Goal: Not fall by end of shift  Outcome: Progressing  Goal: Be free from injury by end of the shift  Outcome: Progressing  Goal: Verbalize understanding of personal risk factors  for fall in the hospital  Outcome: Progressing  Goal: Verbalize understanding of risk factor reduction measures to prevent injury from fall in the home  Outcome: Progressing  Goal: Use assistive devices by end of the shift  Outcome: Progressing  Goal: Pace activities to prevent fatigue by end of the shift  Outcome: Progressing     Problem: Skin  Goal: Decreased wound size/increased tissue granulation at next dressing change  Outcome: Progressing  Goal: Participates in plan/prevention/treatment measures  Outcome: Progressing  Goal: Prevent/manage excess moisture  Outcome: Progressing  Goal: Prevent/minimize sheer/friction injuries  Outcome: Progressing  Goal: Promote/optimize nutrition  Outcome: Progressing  Goal: Promote skin healing  Outcome: Progressing

## 2025-01-27 NOTE — DISCHARGE SUMMARY
Admission Date: 1/26/2025  8:01 PM  Discharge Date: 01/27/25   Condition at discharge: Serious    Discharge Diagnosis  Left-sided weakness/stroke-like symptoms  Intercerebral hemorrhage  Bleeding gums - resolved  L palantine tonsil mass  HTN  HLD  Hyperparathyroidism  Brest cancer s/p mastectomy  Anxiety/dpression  Frontotemporal dementia with behavioral disturbances      Test Results Pending At Discharge  Pending Labs       No current pending labs.            Hospital Course   HPI:  Marli Canales is a 84 y.o. female with PMHx s/f HTN, HLD, hyperparathyroidism, breast cancer status post right mastectomy, depression/anxiety, frontotemporal dementia with behavioral disturbance, TIA presenting with left-sided weakness, left-sided facial droop. LKW around 7pm.  Per , she has been weak for many years but was able to walk without assistant.  Today when she was going to the bathroom her  notes of her abnormal gait and was mostly helping her carry her weight to and from the bathroom. He put her in the chair but she was unable to get up. Later, he notes of her slurred speech and left facial droop and decided to bring her to ED for further evaluation. Patient has severe dysarthria and unable to obtain further history.     Hospital Course:  This is an 83 y/o F admitted for stroke-like symptoms. CT head on arrival showed no evidence of intracranial hemorrhage or large vessel occlusion. Given onset of symptoms,gave 15 mg of TNK for stroke and admitted to ICU. TXA was applied to bleeding gums. All anticoagulants and antiplatelets were due to TNK being given. Initial ischemic stroke permissive hypertension was allowed. Pt showed minimal improvement post-tenecteplase and continued to demonstrate severe aphasia/dysarthria and lower extremity weakness. On neurologic reassessment several hours later pt showed anisocoria with L pupil greater than R. Stat CT head was ordered, showing a large acute hemorrhage involving  the isreal midbrain extending into the L middle cerebral peduncle and L middle cerebellar peduncle. Discussed with neurosurgery at [facility] and pt was transferred to higher level of care. Fibrinogen and INR were checked and were both WNL. Cryoprecipitate was not given. Pt was electively intubated for airway protection by transport before transfer. Pt transferred in serious, but stable condition. Vital signs stable.    Consultations: ICU    Pertinent Physical Exam At Time of Discharge  Physical Exam  Vitals and nursing note reviewed.   Constitutional:       General: She is not in acute distress.     Appearance: She is ill-appearing. She is not toxic-appearing.   HENT:      Head: Normocephalic and atraumatic.      Mouth/Throat:      Mouth: Mucous membranes are moist.      Pharynx: No posterior oropharyngeal erythema.   Eyes:      Extraocular Movements: Extraocular movements intact.      Comments: L pupil dilated > R pupil   Cardiovascular:      Rate and Rhythm: Normal rate and regular rhythm.      Heart sounds: Normal heart sounds. No murmur heard.     No friction rub. No gallop.   Pulmonary:      Effort: Pulmonary effort is normal. No respiratory distress.      Breath sounds: Normal breath sounds. No stridor. No wheezing, rhonchi or rales.   Abdominal:      General: Abdomen is flat. Bowel sounds are normal. There is no distension.      Palpations: Abdomen is soft. There is no mass.      Tenderness: There is no abdominal tenderness. There is no right CVA tenderness, left CVA tenderness, guarding or rebound.   Musculoskeletal:         General: No swelling, tenderness, deformity or signs of injury. Normal range of motion.      Right lower leg: No edema.      Left lower leg: No edema.   Skin:     General: Skin is warm and dry.      Coloration: Skin is not jaundiced or pale.      Findings: No bruising, erythema, lesion or rash.   Neurological:      Mental Status: She is disoriented.      Cranial Nerves: Cranial nerve deficit  present.      Sensory: No sensory deficit.      Motor: Weakness present.   Psychiatric:      Comments: Unable to assess         Code Status  Full Code     Home Medications     Medication List      ASK your doctor about these medications     acetaminophen-codeine 300-30 mg tablet; Commonly known as: Tylenol w/   Codeine #3; Take 1 tablet by mouth once daily at bedtime.   benzocaine 15 mg lozenge   donepezil 10 mg tablet; Commonly known as: Aricept   HYDROcodone-acetaminophen 5-325 mg tablet; Commonly known as: Norco   meloxicam 15 mg tablet; Commonly known as: Mobic; TAKE 1 TABLET BY MOUTH   ONCE  DAILY WITH FOOD AS NEEDED   memantine 10 mg tablet; Commonly known as: Namenda; Take 1 tablet (10   mg) by mouth 2 times a day.   * mirabegron 25 mg tablet extended release 24 hr 24 hr tablet; Commonly   known as: Mybetriq   * mirabegron 50 mg tablet extended release 24 hr 24 hr tablet; Commonly   known as: Myrbetriq; Take 1 tablet (50 mg) by mouth once daily.   NIFEdipine ER 30 mg 24 hr tablet; Commonly known as: Adalat CC; Take 1   tablet (30 mg) by mouth 2 times a day.   potassium citrate 99 mg capsule   Vitamin C 1,000 mg tablet; Generic drug: ascorbic acid   zinc gluconate 50 mg tablet  * This list has 2 medication(s) that are the same as other medications   prescribed for you. Read the directions carefully, and ask your doctor or   other care provider to review them with you.       Outpatient Follow-Up  Future Appointments   Date Time Provider Department Center   1/28/2025  2:00 PM Mara Benton MD [id number] [facility]   3/28/2025 11:00 AM Silvino Millan MD PhD [id number] [facility]         At the time of discharge, patient's pain was controlled with oral analgesia, patient was urinating, having BMs, sleeping, and eating well. Follow up recommendations are in discharge paperwork. Discharge plan was discussed with the patient/family and all of the questions were answered. Medications were ordered to be delivered to  bedside prior to discharge.     Discharge planning took greater than 35 minutes    Diagnoses at time of discharge:  Left-sided weakness/stroke-like symptoms  Intercerebral hemorrhage  Bleeding gums - resolved  L palantine tonsil mass  HTN  HLD  Hyperparathyroidism  Brest cancer s/p mastectomy  Anxiety/dpression  Frontotemporal dementia with behavioral disturbances    Anticipated discharge destination: skilled nursing facility    Please see orders for more complete plan    Time spent on discharge 35 min.    Wilmer Salcedo, DO

## 2025-01-27 NOTE — HOSPITAL COURSE
Ms Marli Canales is a 84 year old with a past medical history of HTN, HLD, hyperparathyroidism, breast cancer s/p right mastectomy, depression/anxiety, 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 On evaluate by Neurology patient was found to have NIHSS 3 (LLE weakness and dysarthria), CT Head showed no intracrnial abnormality and CTA Head/Neck demonstrated no LVO. TNK was given at 20:32 and patient was admitted to ICU for post-TNK management. Patient had gum bleeding that was treated with topical TXA. On neurological evaluation in the ICU patient was noted to have anisocoria L>R, stat CT head demonstrated large acute hemorrhage involving left thalamus extending into isreal, midbrain, left middle cerebral peduncle and left middle cerebellar peduncle. Patient subsequently transferred to NSGY at [facility] for higher level of care.     Patient remained intubated during hospitalization for airway protection. Despite holding sedation, patient remained unresponsive to verbal or noxious stimuli. Pupils non-reactive to light. No corneal reflexes. Gag reflex remains intact at this time. [facility] signed off on 1/27 due to no surgical interventions indicated.  Discussed imaging findings and poor prognosis of neurologic recovery with patients family. She was compassionately extubated on 1/31. Pronounced dead at 1309.

## 2025-01-27 NOTE — PROGRESS NOTES
"Marli Canalse is a 84 y.o. female on day 0 of admission presenting with Intracerebral hemorrhage, nontraumatic (Multi).    Subjective   Patient transferred to neuro stroke team this AM. Patient's  and son at bedside. They are updated on her CTH report this morning. Will return to bedside to thoroughly go over imaging findings and discuss GoC this afternoon.       Objective   Vitals 24 hour ranges:  Heart Rate:  [52-92]   Temp:  [35.6 °C (96.1 °F)-36.3 °C (97.3 °F)]   Resp:  [8-29]   BP: (122-199)/()   Height:  [157.5 cm (5' 2\")-157.5 cm (5' 2.01\")]   Weight:  [59.3 kg (130 lb 11.7 oz)-60.6 kg (133 lb 9.6 oz)]   SpO2:  [84 %-100 %]      Intake/Output for last 24 hours:    Intake/Output Summary (Last 24 hours) at 1/27/2025 1113  Last data filed at 1/27/2025 1100  Gross per 24 hour   Intake 116.94 ml   Output 680 ml   Net -563.06 ml        Vent settings:  Vent Mode: Volume control/assist control  FiO2 (%):  [50 %] 50 %  S RR:  [14] 14  S VT:  [350 mL] 350 mL  PEEP/CPAP (cm H2O):  [5 cm H20] 5 cm H20  MAP (cm H2O):  [7.6-8] 8    Physical Exam  Constitutional:       Appearance: She is cachectic.      Interventions: She is sedated and intubated.   HENT:      Head: Normocephalic and atraumatic.   Eyes:      Comments: R pupil 1mm, L pupil 4mm, both pupils non-responsive to light. Gaze midline.    Cardiovascular:      Rate and Rhythm: Regular rhythm.      Pulses: Normal pulses.      Heart sounds: Normal heart sounds.      Comments: HR 50s-60s  Pulmonary:      Effort: She is intubated.      Breath sounds: Normal breath sounds.      Comments: Intubated  Abdominal:      General: Abdomen is flat. Bowel sounds are normal.      Palpations: Abdomen is soft.   Skin:     General: Skin is warm and dry.   Neurological:      Mental Status: She is unresponsive.      Comments:   CN II, III, IV, VI: Anisocoria present: R pupil 1mm, L pupil 4mm, both pupils non-responsive to light.  Gaze midline.  No blink to threat " response.  CN V: Corneal reflex weakly present on right, absent on left.  CN VII: Face appears symmetric (within limits of exam/intubation)  CN VIII: Unable to assess  CN IX, X: Cough reflex intact, but slightly diminished  CN XI: Unable to assess  CN XII: Tongue with leftward deviation.    RUE/LUE: Flaccid, no withdrawal or flicker to nox stim (nailbed pressure).     RLE/LLE: Flaccid, no withdrawal or flicker to nox stim (nailbed pressure).    REFLEXES:  RIGHT UE                     LEFT UE   BR:2                             BR:2     Biceps:2                       Biceps:2     Triceps:2                      Triceps:2        RIGHT LLE                    LEFT LLE     Patellar:1                      Patellar:0     Ankle:0                         Ankle:0    SENSORY: Does not grimace to nox stim.  No withdrawal in BUE and BLE     COORDINATION: Unable to assess     GAIT: Unable to assess       NIHSS  Level of consciousness: 3 = Comatose  LOC Question: 2 = Neither correct  LOC Commands: 2 = Obeys neither  Best Gaze: 1 = Partial gaze palsy  Visual Field: NT  Facial Paresis: NT  LUE: 4 = No movement  RUE: 4 = No movement  LLE: 4 = No movement  RLE: 4 = No movement  Dysarthria: NT  Best Language: 3 = Mute  Limb Ataxia: NT  Sensory: NT  Neglect: NT    NIHSS Score: 27     Medications    Current Facility-Administered Medications:     acetaminophen-codeine (Tylenol w/ Codeine #3) 300-30 mg per tablet 1 tablet, 1 tablet, oral, Nightly, Shanda Perez MD    ascorbic acid (Vitamin C) tablet 500 mg, 500 mg, oral, Daily, Shanda Perez MD    atropine syringe 0.1 mg/mL  - Omnicell Override Pull, , , ,     donepezil (Aricept) tablet 10 mg, 10 mg, oral, Nightly, Shanda Perez MD    fentanyl (Sublimaze) 10 mcg/mL in sodium chloride 0.9% infusion, 0-300 mcg/hr, intravenous, Continuous, Shanda Perez MD, Stopped at 01/27/25 0810    fentaNYL bolus from bag 25 mcg, 25 mcg, intravenous, q10 min PRN, Shanda Perez MD    hydrALAZINE (Apresoline) injection  10 mg, 10 mg, intravenous, q20 min PRN, Shanda Perez MD    labetaloL (Normodyne,Trandate) injection 10 mg, 10 mg, intravenous, q10 min PRN, Shanda Perez MD    memantine (Namenda) tablet 10 mg, 10 mg, oral, BID, Shanda Perez MD    niCARdipine (Cardene) 40 mg in sodium chloride 200 mL (0.2 mg/mL) infusion (premix), 1-15 mg/hr, intravenous, Continuous PRN, Shanda Perez MD    NIFEdipine ER (Adalat CC) 24 hr tablet 30 mg, 30 mg, oral, BID, Shanda Perez MD    oxybutynin (Ditropan) tablet 5 mg, 5 mg, oral, TID, Shanda Perez MD    oxygen (O2) therapy, , inhalation, Continuous PRN - O2/gases, Shanda Perez MD    oxygen (O2) therapy, , inhalation, Continuous PRN - O2/gases, Shanda Perez MD, 50 percent at 01/27/25 0335    pantoprazole (ProtoNix) injection 40 mg, 40 mg, intravenous, Daily, Callie Deras APRN-CNP, 40 mg at 01/27/25 1003    polyethylene glycol (Glycolax, Miralax) packet 17 g, 17 g, oral, Daily, Shanda Perez MD    propofol (Diprivan) infusion, 5-50 mcg/kg/min, intravenous, Continuous, Shanda Perez MD, Stopped at 01/27/25 0810    sennosides-docusate sodium (Livier-Colace) 8.6-50 mg per tablet 2 tablet, 2 tablet, oral, BID, Shanda Perez MD    sodium chloride 0.9% infusion, 75 mL/hr, intravenous, Continuous, Callie Deras APRN-CNP, Last Rate: 75 mL/hr at 01/27/25 0952, 75 mL/hr at 01/27/25 0952     Lab Results  Admission on 01/27/2025   Component Date Value Ref Range Status    Protime 01/27/2025 12.3  9.8 - 12.8 seconds Final    INR 01/27/2025 1.1  0.9 - 1.1 Final    aPTT 01/27/2025 21 (L)  27 - 38 seconds Final    Glucose 01/27/2025 144 (H)  74 - 99 mg/dL Final    Sodium 01/27/2025 144  136 - 145 mmol/L Final    Potassium 01/27/2025 4.7  3.5 - 5.3 mmol/L Final    Chloride 01/27/2025 111 (H)  98 - 107 mmol/L Final    Bicarbonate 01/27/2025 22  21 - 32 mmol/L Final    Anion Gap 01/27/2025 16  10 - 20 mmol/L Final    Urea Nitrogen 01/27/2025 39 (H)  6 - 23 mg/dL Final    Creatinine 01/27/2025 1.47 (H)  0.50 - 1.05 mg/dL Final     eGFR 01/27/2025 35 (L)  >60 mL/min/1.73m*2 Final    Calcium 01/27/2025 10.1  8.6 - 10.6 mg/dL Final    Phosphorus 01/27/2025 3.8  2.5 - 4.9 mg/dL Final    Albumin 01/27/2025 3.5  3.4 - 5.0 g/dL Final    WBC 01/27/2025 8.5  4.4 - 11.3 x10*3/uL Final    nRBC 01/27/2025 0.0  0.0 - 0.0 /100 WBCs Final    RBC 01/27/2025 4.07  4.00 - 5.20 x10*6/uL Final    Hemoglobin 01/27/2025 12.8  12.0 - 16.0 g/dL Final    Hematocrit 01/27/2025 39.0  36.0 - 46.0 % Final    MCV 01/27/2025 96  80 - 100 fL Final    MCH 01/27/2025 31.4  26.0 - 34.0 pg Final    MCHC 01/27/2025 32.8  32.0 - 36.0 g/dL Final    RDW 01/27/2025 13.6  11.5 - 14.5 % Final    Platelets 01/27/2025 213  150 - 450 x10*3/uL Final    Magnesium 01/27/2025 2.23  1.60 - 2.40 mg/dL Final    Troponin I, High Sensitivity (CMC) 01/27/2025 41 (H)  0 - 34 ng/L Final    Hemoglobin A1C 01/27/2025 5.4  See comment % Final    Estimated Average Glucose 01/27/2025 108  Not Established mg/dL Final    Cholesterol 01/27/2025 165  0 - 199 mg/dL Final    HDL-Cholesterol 01/27/2025 42.0  mg/dL Final    Cholesterol/HDL Ratio 01/27/2025 3.9   Final    LDL Calculated 01/27/2025 98  <=99 mg/dL Final    VLDL 01/27/2025 25  0 - 40 mg/dL Final    Triglycerides 01/27/2025 125  0 - 149 mg/dL Final    Non HDL Cholesterol 01/27/2025 123  0 - 149 mg/dL Final    BNP 01/27/2025 58  0 - 99 pg/mL Final    POCT Glucose 01/27/2025 136 (H)  74 - 99 mg/dL Final   Admission on 01/26/2025, Discharged on 01/27/2025   Component Date Value Ref Range Status    WBC 01/26/2025 12.3 (H)  4.4 - 11.3 x10*3/uL Final    nRBC 01/26/2025 0.0  0.0 - 0.0 /100 WBCs Final    RBC 01/26/2025 4.75  4.00 - 5.20 x10*6/uL Final    Hemoglobin 01/26/2025 15.1  12.0 - 16.0 g/dL Final    Hematocrit 01/26/2025 45.3  36.0 - 46.0 % Final    MCV 01/26/2025 95  80 - 100 fL Final    MCH 01/26/2025 31.8  26.0 - 34.0 pg Final    MCHC 01/26/2025 33.3  32.0 - 36.0 g/dL Final    RDW 01/26/2025 13.8  11.5 - 14.5 % Final    Platelets 01/26/2025 306   150 - 450 x10*3/uL Final    Neutrophils % 01/26/2025 81.3  40.0 - 80.0 % Final    Immature Granulocytes %, Automated 01/26/2025 0.3  0.0 - 0.9 % Final    Lymphocytes % 01/26/2025 11.0  13.0 - 44.0 % Final    Monocytes % 01/26/2025 6.4  2.0 - 10.0 % Final    Eosinophils % 01/26/2025 0.7  0.0 - 6.0 % Final    Basophils % 01/26/2025 0.3  0.0 - 2.0 % Final    Neutrophils Absolute 01/26/2025 9.99 (H)  1.60 - 5.50 x10*3/uL Final    Immature Granulocytes Absolute, Au* 01/26/2025 0.04  0.00 - 0.50 x10*3/uL Final    Lymphocytes Absolute 01/26/2025 1.35  0.80 - 3.00 x10*3/uL Final    Monocytes Absolute 01/26/2025 0.78  0.05 - 0.80 x10*3/uL Final    Eosinophils Absolute 01/26/2025 0.08  0.00 - 0.40 x10*3/uL Final    Basophils Absolute 01/26/2025 0.04  0.00 - 0.10 x10*3/uL Final    Glucose 01/26/2025 124 (H)  74 - 99 mg/dL Final    Sodium 01/26/2025 137  136 - 145 mmol/L Final    Potassium 01/26/2025 4.0  3.5 - 5.3 mmol/L Final    Chloride 01/26/2025 104  98 - 107 mmol/L Final    Bicarbonate 01/26/2025 25  21 - 32 mmol/L Final    Anion Gap 01/26/2025 12  10 - 20 mmol/L Final    Urea Nitrogen 01/26/2025 38 (H)  6 - 23 mg/dL Final    Creatinine 01/26/2025 1.69 (H)  0.50 - 1.05 mg/dL Final    eGFR 01/26/2025 30 (L)  >60 mL/min/1.73m*2 Final    Calcium 01/26/2025 10.2  8.6 - 10.3 mg/dL Final    Albumin 01/26/2025 3.4  3.4 - 5.0 g/dL Final    Alkaline Phosphatase 01/26/2025 105  33 - 136 U/L Final    Total Protein 01/26/2025 6.1 (L)  6.4 - 8.2 g/dL Final    AST 01/26/2025 27  9 - 39 U/L Final    Bilirubin, Total 01/26/2025 0.4  0.0 - 1.2 mg/dL Final    ALT 01/26/2025 37  7 - 45 U/L Final    Troponin I, High Sensitivity 01/26/2025 40 (H)  0 - 13 ng/L Final    Protime 01/26/2025 10.9  9.8 - 12.8 seconds Final    INR 01/26/2025 1.0  0.9 - 1.1 Final    aPTT 01/26/2025 21 (L)  27 - 38 seconds Final    POCT Glucose 01/26/2025 103 (H)  74 - 99 mg/dL Final    RBC Morphology 01/26/2025 No significant RBC morphology present   Final     Fibrinogen 01/27/2025 390  200 - 400 mg/dL Final    ABO TYPE 01/27/2025 O   Final    Rh TYPE 01/27/2025 POS   Final    ANTIBODY SCREEN 01/27/2025 NEG   Final    PRODUCT CODE 01/27/2025 [id number]   Final    Unit Number 01/27/2025 [id number]   Final    Unit ABO 01/27/2025 B   Final    Unit RH 01/27/2025 POS   Final    Dispense Status 01/27/2025 RE   Final    Blood Expiration Date 01/27/2025 1/27/2025  6:59:00 AM EST   Final    PRODUCT BLOOD TYPE 01/27/2025 7300   Final    UNIT VOLUME 01/27/2025 82   Final-Edited    ABO TYPE 01/27/2025 O   Final    Rh TYPE 01/27/2025 POS   Final           Assessment/Plan   Ms Marli Canales is a 84 year old with a past medical history of HTN, HLD, hyperparathyroidism, breast cancer s/p right mastectomy, depression/anxiety, 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 On evaluate by Neurology patient was found to have NIHSS 3 (LLE weakness and dysarthria), CT Head showed no intracrnial abnormality and CTA Head/Neck demonstrated no LVO. TNK was given at 20:32 and patient was admitted to ICU for post-TNK management. Patient had gum bleeding that was treated with topical TXA. On neurological evaluation in the ICU patient was noted to have anisocoria L>R, stat CT head demonstrated large acute hemorrhage involving left thalamus extending into isreal, midbrain, left middle cerebral peduncle and left middle cerebellar peduncle. Patient subsequently transferred to [facility] at [facility] [place] level of care.   Assessment & Plan  Intracerebral hemorrhage, nontraumatic (Multi)      #L Thalamic, isreal, midbrain, cerebral peduncle and middle cerebellar peduncle hemorrhage  -s/p TNK at 20:32 (8:32 PM) on 1/26 for initial NIH 3 with no acute findings on CTH  -rCTH on 1/27 at 9:30 AM shows slightly worsening hemorrhage in the L thalamic area extending upward. Relatively stable  otherwise.  -Currently intubated with no sedation  -Continue namenda for stimulation  -Continue nifedipine for angiospasm prevention  -Will repeat neuro exams q1h  -Continue tele  --150  -TTE not ordered at this time, as it would not offer any benefit pertaining to prognosis  -[facility] discussion this afternoon with  and son    #JAZMIN  -Likely 2/2 HTN  -Cr 1.69 and EGFR 30 on admission  -Cr improved to 1.47 and EGFR 35 on 1/27  -Continuous fluids while NPO  -Avoid nephrotoxic drugs if possible    #Hx of Chemotherapy-related dementia  #Hx of breast CA    F: NS @ 75mL/hr   E: Replete as necessary  N: NPO  GI ppx: pantoprazole IV  DVT ppx: SCDs; okay to start chemo ppx on PBD2 (1/28 AM)    RESTRAINTS:  Not indicated/Patient does not meet criteria for restraints    Mary Manrique DO  PM&R PGY-2

## 2025-01-28 ENCOUNTER — APPOINTMENT (OUTPATIENT)
Dept: DERMATOLOGY | Facility: CLINIC | Age: 85
End: 2025-01-28
Payer: MEDICARE

## 2025-01-28 NOTE — PROGRESS NOTES
Marli Canales is a 84 y.o. female on day 1 of admission presenting with Intracerebral hemorrhage, nontraumatic (Multi).    Subjective   NAEO. Patient intubated and unresponsive this morning. No family at bedside.       Objective   Vitals 24 hour ranges:  Heart Rate:  [60-78]   Temp:  [36.2 °C (97.2 °F)-36.6 °C (97.9 °F)]   Resp:  [14-15]   BP: (128-159)/(72-94)   SpO2:  [96 %-100 %]      Intake/Output for last 24 hours:    Intake/Output Summary (Last 24 hours) at 1/28/2025 0822  Last data filed at 1/28/2025 0700  Gross per 24 hour   Intake 2422.5 ml   Output 520 ml   Net 1902.5 ml        Vent settings:  Vent Mode: Volume control/assist control  FiO2 (%):  [30 %] 30 %  S RR:  [14-20] 20  S VT:  [350 mL] 350 mL  PEEP/CPAP (cm H2O):  [5 cm H20] 5 cm H20  MAP (cm H2O):  [7-11] 7.8    Physical Exam  Constitutional:       Interventions: She is sedated and intubated.   HENT:      Head: Normocephalic and atraumatic.   Eyes:      Comments: R pupil 1mm, L pupil 4mm, both pupils non-responsive to light. Gaze midline.    Cardiovascular:      Rate and Rhythm: Regular rhythm.      Pulses: Normal pulses.      Heart sounds: Normal heart sounds.      Comments: HR 50s-60s  Pulmonary:      Effort: She is intubated.      Breath sounds: Normal breath sounds.      Comments: Intubated  Abdominal:      General: Abdomen is flat. Bowel sounds are normal.      Palpations: Abdomen is soft.   Skin:     General: Skin is warm and dry.   Neurological:      Mental Status: She is unresponsive.      Comments:   CN II, III, IV, VI: Anisocoria present: R pupil 1mm, L pupil 4mm, both pupils non-responsive to light.  Gaze midline.  No blink to threat response.  CN V: Corneal reflex weakly present on right, absent on left.  CN VII: Face appears symmetric (within limits of exam/intubation)  CN VIII: Unable to assess  CN IX, X: Cough reflex intact, but slightly diminished  CN XI: Unable to assess  CN XII: Tongue with leftward deviation.    RUE/LUE:  Flaccid, no withdrawal or flicker to nox stim (nailbed pressure).      RLE/LLE: Flaccid, no withdrawal or flicker to nox stim (nailbed pressure). Triple flexion response bilaterally.     REFLEXES:  RIGHT UE                     LEFT UE   BR:2                             BR:2     Biceps:2                       Biceps:2     Triceps:2                      Triceps:2        RIGHT LLE                    LEFT LLE     Patellar:0                     Patellar:0     Ankle:0                         Ankle:0    SENSORY: Does not grimace to nox stim.  No withdrawal in BUE and BLE     COORDINATION: Unable to assess     GAIT: Unable to assess       NIHSS  Level of consciousness: 3 = Comatose  LOC Question: 2 = Neither correct  LOC Commands: 2 = Obeys neither  Best Gaze: 1 = Partial gaze palsy  Visual Field: NT  Facial Paresis: NT  LUE: 4 = No movement  RUE: 4 = No movement  LLE: 4 = No movement  RLE: 4 = No movement  Dysarthria: NT  Best Language: 3 = Mute  Limb Ataxia: NT  Sensory: NT  Neglect: NT    NIHSS Score: 27     Medications    Current Facility-Administered Medications:     acetaminophen-codeine (Tylenol w/ Codeine #3) 300-30 mg per tablet 1 tablet, 1 tablet, oral, Nightly, Shanda Perez MD    ascorbic acid (Vitamin C) tablet 500 mg, 500 mg, oral, Daily, Shanda Perez MD    donepezil (Aricept) tablet 10 mg, 10 mg, oral, Nightly, Shanda Perez MD    electrolyte-A (Plasmalyte-A) solution, 75 mL/hr, intravenous, Continuous, Callie Deras, APRN-CNP, Last Rate: 75 mL/hr at 01/27/25 1630, 75 mL/hr at 01/27/25 1630    fentanyl (Sublimaze) 10 mcg/mL in sodium chloride 0.9% infusion, 0-300 mcg/hr, intravenous, Continuous, Shanda Perez MD, Stopped at 01/27/25 0810    fentaNYL bolus from bag 25 mcg, 25 mcg, intravenous, q10 min PRN, Shanda Perez MD    heparin (porcine) injection 5,000 Units, 5,000 Units, subcutaneous, q8h, Mary Manrique DO, 5,000 Units at 01/28/25 0613    hydrALAZINE (Apresoline) injection 10 mg, 10 mg, intravenous, q20 min  PRN, Shanda Perez MD, 10 mg at 01/28/25 0203    labetaloL (Normodyne,Trandate) injection 10 mg, 10 mg, intravenous, q10 min PRN, Shanda Perez MD    memantine (Namenda) tablet 10 mg, 10 mg, oral, BID, Shanda Perez MD    niCARdipine (Cardene) 40 mg in sodium chloride 200 mL (0.2 mg/mL) infusion (premix), 1-15 mg/hr, intravenous, Continuous PRN, Shanda Perez MD    NIFEdipine ER (Adalat CC) 24 hr tablet 30 mg, 30 mg, oral, BID, Shanda Perez MD    oxybutynin (Ditropan) tablet 5 mg, 5 mg, oral, TID, Shanda Perez MD    oxygen (O2) therapy, , inhalation, Continuous PRN - O2/gases, Shanda Perez MD    oxygen (O2) therapy, , inhalation, Continuous PRN - O2/gases, Shanda Perez MD, 30 percent at 01/28/25 0756    pantoprazole (ProtoNix) injection 40 mg, 40 mg, intravenous, Daily, Calliedevon Alfordi APRN-CNP, 40 mg at 01/27/25 1003    perflutren protein A microsphere (Optison) injection 0.5 mL, 0.5 mL, intravenous, Once in imaging, Callie Alfordi APRN-CNP    polyethylene glycol (Glycolax, Miralax) packet 17 g, 17 g, oral, Daily, Shanda Perez MD    propofol (Diprivan) infusion, 5-50 mcg/kg/min, intravenous, Continuous, Shanda Perez MD, Stopped at 01/27/25 0810    sennosides-docusate sodium (Livier-Colace) 8.6-50 mg per tablet 2 tablet, 2 tablet, oral, BID, Shanda Perez MD    sulfur hexafluoride microsphr (Lumason) injection 24.28 mg, 2 mL, intravenous, Once in imaging, Callie Deras APRN-CNP     Lab Results  Admission on 01/27/2025   Component Date Value Ref Range Status    Protime 01/27/2025 12.3  9.8 - 12.8 seconds Final    INR 01/27/2025 1.1  0.9 - 1.1 Final    aPTT 01/27/2025 21 (L)  27 - 38 seconds Final    Glucose 01/27/2025 144 (H)  74 - 99 mg/dL Final    Sodium 01/27/2025 144  136 - 145 mmol/L Final    Potassium 01/27/2025 4.7  3.5 - 5.3 mmol/L Final    Chloride 01/27/2025 111 (H)  98 - 107 mmol/L Final    Bicarbonate 01/27/2025 22  21 - 32 mmol/L Final    Anion Gap 01/27/2025 16  10 - 20 mmol/L Final    Urea Nitrogen 01/27/2025 39 (H)  6 -  23 mg/dL Final    Creatinine 01/27/2025 1.47 (H)  0.50 - 1.05 mg/dL Final    eGFR 01/27/2025 35 (L)  >60 mL/min/1.73m*2 Final    Calcium 01/27/2025 10.1  8.6 - 10.6 mg/dL Final    Phosphorus 01/27/2025 3.8  2.5 - 4.9 mg/dL Final    Albumin 01/27/2025 3.5  3.4 - 5.0 g/dL Final    WBC 01/27/2025 8.5  4.4 - 11.3 x10*3/uL Final    nRBC 01/27/2025 0.0  0.0 - 0.0 /100 WBCs Final    RBC 01/27/2025 4.07  4.00 - 5.20 x10*6/uL Final    Hemoglobin 01/27/2025 12.8  12.0 - 16.0 g/dL Final    Hematocrit 01/27/2025 39.0  36.0 - 46.0 % Final    MCV 01/27/2025 96  80 - 100 fL Final    MCH 01/27/2025 31.4  26.0 - 34.0 pg Final    MCHC 01/27/2025 32.8  32.0 - 36.0 g/dL Final    RDW 01/27/2025 13.6  11.5 - 14.5 % Final    Platelets 01/27/2025 213  150 - 450 x10*3/uL Final    Magnesium 01/27/2025 2.23  1.60 - 2.40 mg/dL Final    Troponin I, High Sensitivity (CMC) 01/27/2025 41 (H)  0 - 34 ng/L Final    Hemoglobin A1C 01/27/2025 5.4  See comment % Final    Estimated Average Glucose 01/27/2025 108  Not Established mg/dL Final    Cholesterol 01/27/2025 165  0 - 199 mg/dL Final    HDL-Cholesterol 01/27/2025 42.0  mg/dL Final    Cholesterol/HDL Ratio 01/27/2025 3.9   Final    LDL Calculated 01/27/2025 98  <=99 mg/dL Final    VLDL 01/27/2025 25  0 - 40 mg/dL Final    Triglycerides 01/27/2025 125  0 - 149 mg/dL Final    Non HDL Cholesterol 01/27/2025 123  0 - 149 mg/dL Final    BNP 01/27/2025 58  0 - 99 pg/mL Final    POCT Glucose 01/27/2025 136 (H)  74 - 99 mg/dL Final    AV pk jessica 01/27/2025 1.17  m/s Final    LVOT diam 01/27/2025 1.90  cm Final    MV E/A ratio 01/27/2025 0.95   Final    LA vol index A/L 01/27/2025 17.9  ml/m2 Final    Tricuspid annular plane systolic e* 01/27/2025 1.9  cm Final    LV EF 01/27/2025 69  % Final    RV free wall pk S' 01/27/2025 11.50  cm/s Final    LVIDd 01/27/2025 3.90  cm Final    RVSP 01/27/2025 23.1  mmHg Final    Aortic Valve Area by Continuity of* 01/27/2025 2.39  cm2 Final    AV pk grad 01/27/2025 5   mmHg Final    LV A4C EF 01/27/2025 63.0   Final    Protime 01/28/2025 12.1  9.8 - 12.8 seconds Final    INR 01/28/2025 1.1  0.9 - 1.1 Final    aPTT 01/28/2025 27  27 - 38 seconds Final    Magnesium 01/28/2025 2.24  1.60 - 2.40 mg/dL Final    Glucose 01/28/2025 96  74 - 99 mg/dL Final    Sodium 01/28/2025 145  136 - 145 mmol/L Final    Potassium 01/28/2025 4.4  3.5 - 5.3 mmol/L Final    Chloride 01/28/2025 113 (H)  98 - 107 mmol/L Final    Bicarbonate 01/28/2025 23  21 - 32 mmol/L Final    Anion Gap 01/28/2025 13  10 - 20 mmol/L Final    Urea Nitrogen 01/28/2025 38 (H)  6 - 23 mg/dL Final    Creatinine 01/28/2025 1.27 (H)  0.50 - 1.05 mg/dL Final    eGFR 01/28/2025 42 (L)  >60 mL/min/1.73m*2 Final    Calcium 01/28/2025 9.9  8.6 - 10.6 mg/dL Final    Phosphorus 01/28/2025 3.6  2.5 - 4.9 mg/dL Final    Albumin 01/28/2025 3.3 (L)  3.4 - 5.0 g/dL Final    POCT Glucose 01/27/2025 94  74 - 99 mg/dL Final    POCT Glucose 01/28/2025 91  74 - 99 mg/dL Final   Admission on 01/26/2025, Discharged on 01/27/2025   Component Date Value Ref Range Status    WBC 01/26/2025 12.3 (H)  4.4 - 11.3 x10*3/uL Final    nRBC 01/26/2025 0.0  0.0 - 0.0 /100 WBCs Final    RBC 01/26/2025 4.75  4.00 - 5.20 x10*6/uL Final    Hemoglobin 01/26/2025 15.1  12.0 - 16.0 g/dL Final    Hematocrit 01/26/2025 45.3  36.0 - 46.0 % Final    MCV 01/26/2025 95  80 - 100 fL Final    MCH 01/26/2025 31.8  26.0 - 34.0 pg Final    MCHC 01/26/2025 33.3  32.0 - 36.0 g/dL Final    RDW 01/26/2025 13.8  11.5 - 14.5 % Final    Platelets 01/26/2025 306  150 - 450 x10*3/uL Final    Neutrophils % 01/26/2025 81.3  40.0 - 80.0 % Final    Immature Granulocytes %, Automated 01/26/2025 0.3  0.0 - 0.9 % Final    Lymphocytes % 01/26/2025 11.0  13.0 - 44.0 % Final    Monocytes % 01/26/2025 6.4  2.0 - 10.0 % Final    Eosinophils % 01/26/2025 0.7  0.0 - 6.0 % Final    Basophils % 01/26/2025 0.3  0.0 - 2.0 % Final    Neutrophils Absolute 01/26/2025 9.99 (H)  1.60 - 5.50 x10*3/uL Final     Immature Granulocytes Absolute, Au* 01/26/2025 0.04  0.00 - 0.50 x10*3/uL Final    Lymphocytes Absolute 01/26/2025 1.35  0.80 - 3.00 x10*3/uL Final    Monocytes Absolute 01/26/2025 0.78  0.05 - 0.80 x10*3/uL Final    Eosinophils Absolute 01/26/2025 0.08  0.00 - 0.40 x10*3/uL Final    Basophils Absolute 01/26/2025 0.04  0.00 - 0.10 x10*3/uL Final    Glucose 01/26/2025 124 (H)  74 - 99 mg/dL Final    Sodium 01/26/2025 137  136 - 145 mmol/L Final    Potassium 01/26/2025 4.0  3.5 - 5.3 mmol/L Final    Chloride 01/26/2025 104  98 - 107 mmol/L Final    Bicarbonate 01/26/2025 25  21 - 32 mmol/L Final    Anion Gap 01/26/2025 12  10 - 20 mmol/L Final    Urea Nitrogen 01/26/2025 38 (H)  6 - 23 mg/dL Final    Creatinine 01/26/2025 1.69 (H)  0.50 - 1.05 mg/dL Final    eGFR 01/26/2025 30 (L)  >60 mL/min/1.73m*2 Final    Calcium 01/26/2025 10.2  8.6 - 10.3 mg/dL Final    Albumin 01/26/2025 3.4  3.4 - 5.0 g/dL Final    Alkaline Phosphatase 01/26/2025 105  33 - 136 U/L Final    Total Protein 01/26/2025 6.1 (L)  6.4 - 8.2 g/dL Final    AST 01/26/2025 27  9 - 39 U/L Final    Bilirubin, Total 01/26/2025 0.4  0.0 - 1.2 mg/dL Final    ALT 01/26/2025 37  7 - 45 U/L Final    Troponin I, High Sensitivity 01/26/2025 40 (H)  0 - 13 ng/L Final    Protime 01/26/2025 10.9  9.8 - 12.8 seconds Final    INR 01/26/2025 1.0  0.9 - 1.1 Final    aPTT 01/26/2025 21 (L)  27 - 38 seconds Final    Ventricular Rate 01/26/2025 129  BPM Preliminary    Atrial Rate 01/26/2025 0  BPM Preliminary    OR Interval 01/26/2025 75  ms Preliminary    QRS Duration 01/26/2025 138  ms Preliminary    QT Interval 01/26/2025 432  ms Preliminary    QTC Calculation(Bazett) 01/26/2025 634  ms Preliminary    R Axis 01/26/2025 -85  degrees Preliminary    T Axis 01/26/2025 29  degrees Preliminary    QRS Count 01/26/2025 18  beats Preliminary    Q Onset 01/26/2025 251  ms Preliminary    T Offset 01/26/2025 467  ms Preliminary    QTC Fredericia 01/26/2025 558  ms Preliminary    POCT  Glucose 01/26/2025 103 (H)  74 - 99 mg/dL Final    RBC Morphology 01/26/2025 No significant RBC morphology present   Final    Fibrinogen 01/27/2025 390  200 - 400 mg/dL Final    ABO TYPE 01/27/2025 O   Final    Rh TYPE 01/27/2025 POS   Final    ANTIBODY SCREEN 01/27/2025 NEG   Final    PRODUCT CODE 01/27/2025 [id number]   Final    Unit Number 01/27/2025 [id number]   Final    Unit ABO 01/27/2025 B   Final    Unit RH 01/27/2025 POS   Final    Dispense Status 01/27/2025 RE   Final    Blood Expiration Date 01/27/2025 1/27/2025  6:59:00 AM EST   Final    PRODUCT BLOOD TYPE 01/27/2025 7300   Final    UNIT VOLUME 01/27/2025 82   Final-Edited    ABO TYPE 01/27/2025 O   Final    Rh TYPE 01/27/2025 POS   Final           Assessment/Plan   Ms Marli Canales is a 84 year old with a past medical history of HTN, HLD, hyperparathyroidism, breast cancer s/p right mastectomy, depression/anxiety, 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 On evaluate by Neurology patient was found to have NIHSS 3 (LLE weakness and dysarthria), CT Head showed no intracrnial abnormality and CTA Head/Neck demonstrated no LVO. TNK was given at 20:32 and patient was admitted to ICU for post-TNK management. Patient had gum bleeding that was treated with topical TXA. On neurological evaluation in the ICU patient was noted to have anisocoria L>R, stat CT head demonstrated large acute hemorrhage involving left thalamus extending into isreal, midbrain, left middle cerebral peduncle and left middle cerebellar peduncle. Patient subsequently transferred to NSGY at [facility] [place] for higher level of care.   Assessment & Plan  Intracerebral hemorrhage, nontraumatic (Multi)      #L Thalamic, isreal, midbrain, cerebral peduncle and middle cerebellar peduncle hemorrhage  -s/p TNK at 20:32 (8:32 PM) on 1/26 for initial NIH 3 with no acute findings on CTH  -rCTH on 1/27  at 9:30 AM shows slightly worsening hemorrhage in the L thalamic area extending upward. Relatively stable otherwise.  -rCTH on 1/27 at 3 PM relatively stable  -Currently intubated with no sedation  -Continue namenda and aricept for stimulation  -Continue nifedipine for angiospasm prevention  -Consider discontinuing ditropan due to anticholinergic SE  -Neuro exams q1h  -Continue tele  --150  -TTE shows LVEF 69%, mildly enlarged R ventricle, no PFO  -Ongoing GoC discussion     #JAZMIN  -Likely 2/2 HTN  -Cr 1.69 and EGFR 30 on admission  -Cr improved to 1.27   -Continue IV electrolyte fluid  -Avoid nephrotoxic drugs if possible    #Hx of Chemotherapy-related dementia  #Hx of breast CA    F: electrolyte-A @ 75mL/hr  E: Replete as necessary  N: NPO  GI ppx: pantoprazole IV  DVT ppx: subcutaneous heparin q8h and SCD    RESTRAINTS:  Not indicated/Patient does not meet criteria for restraints    Mary Manrique,   PM&R PGY-2

## 2025-01-28 NOTE — PROGRESS NOTES
[facility]  NEUROSCIENCE INTENSIVE CARE UNIT  DAILY PROGRESS NOTE       Patient Name: Marli Canales   MRN: [id number]     Admit Date: 2025     : 1940 AGE: 84 y.o. GENDER: female        Subjective  Marli Canales is 84 y.o. 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  presented to [facility] ED with acute L weakness, slurred speech, and L FD.  LKW  1900. CTH negative, CTA no LVO, incidental L palatine tonsil mass s/p TNK at [facility]  @. several hours later pt showed anisocoria with L pupil greater than R. Stat CT head was ordered, showing a large acute hemorrhage involving the isreal midbrain extending into the L thalamus (vol < 30 ml). Fibrinogen & INR were WNL. Cryo not given. Pt intubated for airway protect prior to transferring to [facility] for further management of ICH. En route to [facility], pt had an episode of bradycardia to 30s requiring Atropine.     In regards to her Dementia, sees Dr. Sergio Millan (last 2024), MOCA at that time . At baseline she is independent with ADLs but husbands assists with mobility.     Significant Events:  - : LKW  1900 s/p TNK at [facility]  @  - : Transferred to [facility]    Interval Events: Admitted to NSU overnight     Objective   VITALS (24H):  Temp:  [36.2 °C (97.2 °F)-36.6 °C (97.9 °F)] 36.5 °C (97.7 °F)  Heart Rate:  [60-78] 67  Resp:  [14-15] 14  BP: (128-159)/(72-94) 150/82  FiO2 (%):  [30 %] 30 %  INTAKE/OUTPUT:  Intake/Output Summary (Last 24 hours) at 2025 0818  Last data filed at 2025 0700  Gross per 24 hour   Intake 2422.5 ml   Output 520 ml   Net 1902.5 ml     VENT SETTINGS:  Vent Mode: Volume control/assist control  FiO2 (%):  [30 %] 30 %  S RR:  [14-20] 20  S VT:  [350 mL] 350 mL  PEEP/CPAP (cm H2O):  [5 cm H20] 5 cm H20  MAP (cm H2O):  [7-11] 7.8      PHYSICAL EXAM:  NEURO:  - Prop & fent paused for 20 min  - ECNS, not grimacing to nox stim, Not following commands  - Anisocoria present: R pupil 1 mm, L pupil oval 3 mm. Minimally reactive  - RUE flaccid, LUE flicker wd, BLE triple flexion L >R, tongue deviated to left   - No corneal reflex or gag present, Weak cough  CV:  - Bradycardia 50s, occasional PACs    RESP:  - No signs of resp distress and intubated  - Oxygen: 30% fio2, PEEP 5    :  - urinary catheter in place with yellow urine   GI:  - Abdomen NT/ND, soft  SKIN:  - Intact    MEDICATIONS:  Scheduled: PRN: Continuous:   acetaminophen-codeine, 1 tablet, oral, Nightly  ascorbic acid, 500 mg, oral, Daily  donepezil, 10 mg, oral, Nightly  heparin (porcine), 5,000 Units, subcutaneous, q8h  memantine, 10 mg, oral, BID  NIFEdipine ER, 30 mg, oral, BID  oxybutynin, 5 mg, oral, TID  pantoprazole, 40 mg, intravenous, Daily  perflutren protein A microsphere, 0.5 mL, intravenous, Once in imaging  polyethylene glycol, 17 g, oral, Daily  sennosides-docusate sodium, 2 tablet, oral, BID  sulfur hexafluoride microsphr, 2 mL, intravenous, Once in imaging     PRN medications: fentaNYL, hydrALAZINE, labetaloL, niCARdipine, oxygen, oxygen electrolyte-A, 75 mL/hr, Last Rate: 75 mL/hr (01/27/25 1630)  fentaNYL, 0-300 mcg/hr, Last Rate: Stopped (01/27/25 0810)  niCARdipine, 1-15 mg/hr  propofol, 5-50 mcg/kg/min, Last Rate: Stopped (01/27/25 0810)         LAB RESULTS:      IMAGING RESULTS:  XR abdomen 1 view         Transthoracic Echo (TTE) Complete   Final Result      CT head wo IV contrast   Final Result   1. No significant change in the appearance of an acute hyperdense   intraparenchymal hematoma involving the midbrain and isreal with   extension along the left middle cerebellar peduncle and left thalamus   with mildly increased degree of localized edema. Similar local mass   effect on the 3rd ventricle with slight hemorrhagic extension to the   3rd and 4th ventricles.   2.  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   Presence of hydrocephalus was reported on the prior examination of   01/27/2025 9:36 a.m.. Please correlate clinically.             I personally reviewed the image(s)/study and resident interpretation   as stated by Dr. Jessika Dangelo MD. I agree with the findings as   stated. This study was interpreted at [facility], [place], [place].        MACRO:   Critical Finding:  See findings. Notification was initiated on   1/27/2025 at 5:38 pm by  Eloy Lau.  (**-YCF-**)        Signed by: Eloy Lau 1/27/2025 5:38 PM   Dictation workstation:   [id number]      CT head wo IV contrast   Final Result   Interval worsening of the predominantly brainstem intraparenchymal   hemorrhage with new hemorrhage extension into the left thalamus,   decompression into the 3rd ventricle, and early development of new   hydrocephalus.        I personally reviewed the images/study and I agree with the findings   as stated. This study was interpreted at [place], [place].        MACRO:   Huber Pruitt discussed the significance and urgency of this   critical finding via epic secure chat with Dr. Jeong on   1/27/2025 at 10:01 am.  (**-RCF-**) Findings:  See findings.             Signed by: Jessica Ramirez 1/27/2025 11:25 AM   Dictation workstation:   [id number]      XR chest 1 view   Final Result   1.  Consolidative left retrocardiac/basilar opacity and linear   opacities of the right lung base. Findings are favored to represent   atelectasis however underlying infectious process not definitively   excluded.   2. Low lung volumes with endotracheal tube located 1.4 cm above the   yamilex.  Recommend retraction.        I personally reviewed the images/study and I agree with the findings   as stated by resident Fabiano Wang. This study was interpreted   at [facility], [place], [place].        MACRO:   Critical Finding:  See findings. Notification was initiated on   1/27/2025 at 6:38 am by  Fabiano Wang.  (**-YCF-**) Instructions:   Repositioning is recommended.        Signed by: Kaye Orellana 1/27/2025 8:53 AM   Dictation workstation:   [id number]             Assessment/Plan    84 y.o. female with PMH MH of HTN, HLD, hyperparaT, breast cancer s/p R mastectomy, depression/anxiety, frontotemporal demntia (MOCA at that time was 20/30) .  Admitted 1/27/2025 with Intracerebral hemorrhage, nontraumatic (Multi) after presenting with hemorrhage involving the isreal, midbrain, & L thalamus (vol < 30 ml).     NEURO:  #L thalamic & brainstem ICH   Assessment:  - Exam as above   Plan:  - NSU  - Neuro Checks: Q1H  - Sedation: Propofol and Fentanyl - holding currently   - Pain: fent gtt  - Nausea: None  - Stroke consulted, NSGY primary  - Fu 6 hr stability scan   - GOC discussion   - TTE w/ bubble study    CARDIOVASCULAR:  #HTN  #HLD  #Bradycardia  Assessment:  - Home med: Nifedipine 30 mg BID  - ECHO: pending from this admission     - s/p atropine en route to [facility] for thea 30s. Currently thea 50s, BP stable  Plan:  - Continue to monitor on telemetry  - BP goal: 130-150    --> PRN: Labetalol and Hydralazine   - fu ECHO  - 12 lead EKG     RESPIRATORY:  #respiratory insufficiency 2/2 ICH requiring intubation for airway protection  Assessment:  - Intubated at OSH prior to transfer to [facility] for airway protection 1/26  Plan:  - Continuous pulse oximetry   - O2 PRN to maintain SpO2 > 94%, wean as tolerated  - Ventilator bundle while intubated and Daily CPAP and weaning parameters while intubated    RENAL/:  #JAZMIN  Assessment:  - Baseline BUN/Cr: 19/ .97-1  Results from last 72  hours   Lab Units 25   BUN mg/dL 38* 39*   CREATININE mg/dL 1.27* 1.47*       Plan:  - Monitor with daily RFP  - Maintain mckenna catheter for: critically ill patient who need accurate urinary output measurements  - Avoid nephrotoxic agents  - 500 ml Nacl bolus for renal hydration     FEN/GI:  #STU  Assessment:  - Last BM Date:  (pta)Last BM: PTA  Plan:  - Monitor and replace electrolytes per protocol  - IVF: NS @ 75 mL/hr  - Diet: NPO   - Bowel Regimen: Docusate-Senna BID and Miralax QD    ENDOCRINE:  #hyperparaT  Assessment:  Results from last 7 days   Lab Units 25  19325   POCT GLUCOSE mg/dL  --  94 136*  --   --   --  103*   GLUCOSE mg/dL 96  --   --  144*   < > 124*  --    SODIUM mmol/L 145  --   --  144  --  137  --     < > = values in this interval not displayed.        Plan:  - Accuchecks & ISS PRN     HEMATOLOGY:  #STU  Assessment:  - Baseline Hgb: 15  - Baseline Plts: 295  Results from last 7 days   Lab Units 25  0406 25  2108   HEMOGLOBIN g/dL 12.8 15.1   HEMATOCRIT % 39.0 45.3   PLATELETS AUTO x10*3/uL 213 306     Plan:  - Continue to monitor with daily CBC and Coag panel    INFECTIOUS DISEASE:  #STU  Assessment:  Results from last 7 days   Lab Units 25  0406 25  2108   WBC AUTO x10*3/uL 8.5 12.3*    - Temp (24hrs), Av.4 °C (97.5 °F), Min:36.2 °C (97.2 °F), Max:36.6 °C (97.9 °F)     Plan:  - Continue to monitor for s/sx of infection  - Pan culture for temperature > 38.4 C    MUSCULOSKELETAL:  - No acute issues    SKIN:  - No acute issues  - Turns and skin care per NSU protocol    ACCESS:  - PIV    PROPHYLAXIS:  - DVT Ppx: SCDs  - GI Ppx: Pantoprazole    RESTRAINTS:  Not indicated/Patient does not meet criteria for restraints    Oj Valverde, APRN-CNP  Neuroscience Intensive Care       Total critical care time of 60 minutes, with > 50% of time spent in  direct contact with patient/family for education, counseling and coordination of care.

## 2025-01-28 NOTE — PROGRESS NOTES
Spiritual Care Visit  Spiritual Care Request    Reason for Visit:  Routine Visit: Introduction     Request Received From:  Referral From: Verbal    Focus of Care:  Visited With: Family       Refer to :  Referral To:      Spiritual Care Annotation    Annotation:   visit was declined at this time.  will remain available for support.

## 2025-01-29 NOTE — CARE PLAN
Problem: Pain - Adult  Goal: Verbalizes/displays adequate comfort level or baseline comfort level  Outcome: Progressing     Problem: Safety - Adult  Goal: Free from fall injury  Outcome: Progressing     Problem: Chronic Conditions and Co-morbidities  Goal: Patient's chronic conditions and co-morbidity symptoms are monitored and maintained or improved  Outcome: Progressing     Problem: General Stroke  Goal: Demonstrate improvement in neurological exam throughout the shift  Outcome: Progressing  Goal: Maintain BP within ordered limits throughout shift  Outcome: Progressing  Goal: No symptoms of aspiration throughout shift  Outcome: Progressing     Problem: ICU Stroke  Goal: Maintain patent airway throughout shift  Outcome: Progressing     Problem: Fall/Injury  Goal: Not fall by end of shift  Outcome: Progressing     Problem: Skin  Goal: Prevent/manage excess moisture  Outcome: Progressing

## 2025-01-29 NOTE — CARE PLAN
Interprofessional Rounds    Summary:  pontine hemorrhage.  Discussion to transition to comfort care.     Participants: Advance Practice Provider, Ethicist, Occupational Therapist, Physical Therapist, Physician, and RN    Care Plan Reviewed with:  Interdisciplinary Team, family

## 2025-01-29 NOTE — PROGRESS NOTES
[facility]  NEUROSCIENCE INTENSIVE CARE UNIT  DAILY PROGRESS NOTE       Patient Name: Marli Canales   MRN: [id number]     Admit Date: 2025     : 1940 AGE: 84 y.o. GENDER: female        Subjective  Marli Canales is 84 y.o. 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  presented to [facility] ED with acute L weakness, slurred speech, and L FD.  LKW 0. CTH negative, CTA no LVO, incidental L palatine tonsil mass s/p TNK at [facility]  @. several hours later pt showed anisocoria with L pupil greater than R. Stat CT head was ordered, showing a large acute hemorrhage involving the isreal midbrain extending into the L thalamus (vol < 30 ml). Fibrinogen & INR were WNL. Cryo not given. Pt intubated for airway protect prior to transferring to [facility] for further management of ICH. En route to [facility], pt had an episode of bradycardia to 30s requiring Atropine.     In regards to her Dementia, sees Dr. Sergio Millan (last 2024), MOCA at that time . At baseline she is independent with ADLs but husbands assists with mobility.     Significant Events:  - KAYA overnight,  - ECNS, not grimacing to nox stim, Not following commands  - Anisocoria present: R pupil 1 mm, L pupil oval 3 mm. Minimally reactive  - RUE flaccid, LUE flicker wd, BLE triple flexion L >R, tongue deviated to left   - No corneal reflex or gag present, Weak cough    Interval Events: Admitted to NSU overnight     Objective   VITALS (24H):  Temp:  [35.9 °C (96.6 °F)-37.1 °C (98.8 °F)] 36.8 °C (98.2 °F)  Heart Rate:  [60-82] 61  Resp:  [14-19] 14  BP: (115-167)/(70-98) 143/83  FiO2 (%):  [30 %] 30 %  INTAKE/OUTPUT:  Intake/Output Summary (Last 24 hours) at 2025 0731  Last data filed at 2025 0700  Gross per 24 hour   Intake 1881.01 ml   Output 617 ml   Net 1264.01 ml      VENT SETTINGS:  Vent Mode: Volume control/assist control  FiO2 (%):  [30 %] 30 %  S RR:  [14] 14  S VT:  [350 mL] 350 mL  PEEP/CPAP (cm H2O):  [5 cm H20] 5 cm H20  MAP (cm H2O):  [7-7.8] 7     PHYSICAL EXAM:  NEURO:  - No sedation  - ECNS, not grimacing to nox stim, Not following commands  - Anisocoria present: R pupil 1 mm, L pupil oval 3 mm. Minimally reactive  - RUE flaccid, LUE flicker wd, BLE triple flexion L >R, tongue deviated to left   - No corneal reflex or gag present, Weak cough    CV:  - RRR, 60s, occasional PACs    RESP:  - No signs of resp distress and intubated  - Oxygen: 50% fio2, PEEP 5    :  - urinary catheter in place with yellow urine   GI:  - Abdomen NT/ND, soft  SKIN:  - Intact    MEDICATIONS:  Scheduled: PRN: Continuous:   ascorbic acid, 500 mg, oral, Daily  donepezil, 10 mg, oral, Nightly  heparin (porcine), 5,000 Units, subcutaneous, q8h  memantine, 10 mg, oral, BID  NIFEdipine ER, 30 mg, oral, BID  oxybutynin, 5 mg, oral, TID  pantoprazole, 40 mg, intravenous, Daily  perflutren protein A microsphere, 0.5 mL, intravenous, Once in imaging  polyethylene glycol, 17 g, oral, Daily  sennosides-docusate sodium, 2 tablet, oral, BID  sulfur hexafluoride microsphr, 2 mL, intravenous, Once in imaging     PRN medications: acetaminophen **OR** acetaminophen, hydrALAZINE, labetaloL, niCARdipine, oxyCODONE, oxyCODONE, oxygen, oxygen electrolyte-A, 75 mL/hr, Last Rate: 75 mL/hr (01/29/25 0700)  niCARdipine, 1-15 mg/hr         LAB RESULTS:      IMAGING RESULTS:  XR abdomen 1 view   Final Result   1. Nonobstructive bowel-gas pattern with a large colonic stool burden.   2. Enteric tube tip projects over the distal gastric antrum/pyloric   region.   3. Questionable small left pleural effusion.        I personally reviewed the image(s)/study and resident interpretation   as stated by Dr. Jessika Dangelo MD. I agree with the findings as   stated. This study was interpreted at [facility]  [place], [place].        MACRO:   None        Signed by: Anthony Jara 1/28/2025 8:20 AM   Dictation workstation:   [id number]      Transthoracic Echo (TTE) Complete   Final Result      CT head wo IV contrast   Final Result   1. No significant change in the appearance of an acute hyperdense   intraparenchymal hematoma involving the midbrain and isreal with   extension along the left middle cerebellar peduncle and left thalamus   with mildly increased degree of localized edema. Similar local mass   effect on the 3rd ventricle with slight hemorrhagic extension to the   3rd and 4th ventricles.   2. 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   Presence of hydrocephalus was reported on the prior examination of   01/27/2025 9:36 a.m.. Please correlate clinically.             I personally reviewed the image(s)/study and resident interpretation   as stated by Dr. Jessika Dangelo MD. I agree with the findings as   stated. This study was interpreted at [place], [place].        MACRO:   Critical Finding:  See findings. Notification was initiated on   1/27/2025 at 5:38 pm by  Eloy Lau.  (**-YCF-**)        Signed by: Eloy Lau 1/27/2025 5:38 PM   Dictation workstation:   [id number]      CT head wo IV contrast   Final Result   Interval worsening of the predominantly brainstem intraparenchymal   hemorrhage with new hemorrhage extension into the left thalamus,   decompression into the 3rd ventricle, and early development of new   hydrocephalus.        I personally reviewed the images/study and I agree with the findings   as stated. This study was interpreted at [place], [place].         MACRO:   Huber Pruitt discussed the significance and urgency of this   critical finding via [employer] secure chat with Dr. Jeong on   1/27/2025 at 10:01 am.  (**-RCF-**) Findings:  See findings.             Signed by: Jessica Ramirez 1/27/2025 11:25 AM   Dictation workstation:   [id number]      XR chest 1 view   Final Result   1.  Consolidative left retrocardiac/basilar opacity and linear   opacities of the right lung base. Findings are favored to represent   atelectasis however underlying infectious process not definitively   excluded.   2. Low lung volumes with endotracheal tube located 1.4 cm above the   yamilex. Recommend retraction.        I personally reviewed the images/study and I agree with the findings   as stated by resident Fabiano Wang. This study was interpreted   at [place], [place].        MACRO:   Critical Finding:  See findings. Notification was initiated on   1/27/2025 at 6:38 am by  Fabiano Wang.  (**-YCF-**) Instructions:   Repositioning is recommended.        Signed by: Kaye Orellana 1/27/2025 8:53 AM   Dictation workstation:   [id number]             Assessment/Plan    84 y.o. female with PMH MH of HTN, HLD, hyperparaT, breast cancer s/p R mastectomy, depression/anxiety, frontotemporal demntia (MOCA at that time was 20/30) .  Admitted 1/27/2025 with Intracerebral hemorrhage, nontraumatic (Multi) after presenting with hemorrhage involving the isreal, midbrain, & L thalamus (vol < 30 ml).     NEURO:  #L thalamic & brainstem ICH   Assessment:  - Exam as above, not on any sedation  Plan:  - NSU  - Neuro Checks: Q1H  - Nausea: None  - Poor exam, poor prognosis   - GOC discussion today    CARDIOVASCULAR:  #HTN  #HLD  Assessment:  - Home med: Nifedipine 30 mg BID  - ECHO: pending from this admission     - s/p atropine en route to [facility] for thea 30s. Currently thea 50s, BP stable  Plan:  - Continue to monitor on telemetry  - BP goal:  130-150    --> PRN: Labetalol and Hydralazine   - ECHO suboptimal, no PFO, EF ~ 70%    RESPIRATORY:  #respiratory insufficiency 2/2 ICH requiring intubation for airway protection  Assessment:  - Intubated at OSH prior to transfer to [facility] for airway protection 1/26  Plan:  - Continuous pulse oximetry   - O2 PRN to maintain SpO2 > 94%, wean as tolerated  - Ventilator bundle while intubated and Daily CPAP and weaning parameters while intubated    RENAL/:  #JAZMIN - improved, likely prerenal  Assessment:  - Baseline BUN/Cr: 19/ .97-1  Results from last 72 hours   Lab Units 01/29/25  0003 01/28/25  0212   BUN mg/dL 28* 38*   CREATININE mg/dL 0.98 1.27*       Plan:  - Monitor with daily RFP  - Maintain mckenna catheter for: critically ill patient who need accurate urinary output measurements  - Avoid nephrotoxic agents  - MIV until TF / FWF started    FEN/GI:  #STU  Assessment:  - Last BM Date:  (PTA)Last BM: PTA  Plan:  - Monitor and replace electrolytes per protocol  - Change IVF to LR given hypernatremia  - If family does not transition to comfort will start TF, would eventually need PEG  - Diet: NPO   - Bowel Regimen: Docusate-Senna BID and Miralax QD    ENDOCRINE:  #hyperparaT  Assessment:  Results from last 7 days   Lab Units 01/29/25  0610 01/29/25  0003 01/28/25 2025 01/28/25  1534 01/28/25  1211 01/28/25  0818 01/28/25  0212 01/27/25  1932 01/27/25  0408 01/27/25  0406 01/26/25 2025   POCT GLUCOSE mg/dL 96  --  81 87 101* 91  --  94   < >  --   --    GLUCOSE mg/dL  --  90  --   --   --   --  96  --   --  144* 124*   SODIUM mmol/L  --  147*  --   --   --   --  145  --   --  144 137    < > = values in this interval not displayed.     Plan:  - Accuchecks & ISS PRN     HEMATOLOGY:  #STU  Assessment:  - Baseline Hgb: 15  - Baseline Plts: 295  Results from last 7 days   Lab Units 01/29/25  0003 01/27/25  0406 01/26/25  2108   HEMOGLOBIN g/dL 11.6* 12.8 15.1   HEMATOCRIT % 35.2* 39.0 45.3   PLATELETS AUTO x10*3/uL 197 213  306     Plan:  - Continue to monitor with daily CBC and Coag panel    INFECTIOUS DISEASE:  #STU  Assessment:  Results from last 7 days   Lab Units 25  0003 25  0406 25  2108   WBC AUTO x10*3/uL 7.8 8.5 12.3*    - Temp (24hrs), Av.6 °C (97.9 °F), Min:35.9 °C (96.6 °F), Max:37.1 °C (98.8 °F)     Plan:  - Continue to monitor for s/sx of infection  - Pan culture for temperature > 38.4 C    MUSCULOSKELETAL:  - No acute issues    SKIN:  - No acute issues  - Turns and skin care per NSU protocol    ACCESS:  - PIV    PROPHYLAXIS:  - DVT Ppx: SCDs  - GI Ppx: Pantoprazole    RESTRAINTS:  Not indicated/Patient does not meet criteria for restraints    Melo John, APRN-CNP, DNP  Neuroscience Intensive Care       Total critical care time of 60 minutes, with > 50% of time spent in direct contact with patient/family for education, counseling and coordination of care.

## 2025-01-30 NOTE — PROGRESS NOTES
[facility] (formerly [facility])  NEUROSCIENCE INTENSIVE CARE UNIT  DAILY PROGRESS NOTE       Patient Name: Marli Canales   MRN: [id number]     Admit Date: 2025     : 1940 AGE: 84 y.o. GENDER: female        Subjective  Marli Canales is 84 y.o. 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  presented to [place] ED with acute L weakness, slurred speech, and L FD.  LKW 0. CTH negative, CTA no LVO, incidental L palatine tonsil mass s/p TNK at [place]  @. several hours later pt showed anisocoria with L pupil greater than R. Stat CT head was ordered, showing a large acute hemorrhage involving the isreal midbrain extending into the L thalamus (vol < 30 ml). Fibrinogen & INR were WNL. Cryo not given. Pt intubated for airway protect prior to transferring to [facility] for further management of ICH. En route to [facility], pt had an episode of bradycardia to 30s requiring Atropine.     In regards to her Dementia, sees Dr. Sergio Millan (last 2024), MOCA at that time . At baseline she is independent with ADLs but husbands assists with mobility.    Interval Events: NAEON     Objective   VITALS (24H):  Temp:  [36.6 °C (97.9 °F)-37.7 °C (99.9 °F)] 36.9 °C (98.4 °F)  Heart Rate:  [62-96] 82  Resp:  [14-19] 14  BP: (110-153)/(65-86) 124/72  FiO2 (%):  [30 %] 30 %  INTAKE/OUTPUT:  Intake/Output Summary (Last 24 hours) at 2025 1055  Last data filed at 2025 1000  Gross per 24 hour   Intake 2438.75 ml   Output 790 ml   Net 1648.75 ml     VENT SETTINGS:  Vent Mode: Volume control/assist control  FiO2 (%):  [30 %] 30 %  S RR:  [14] 14  S VT:  [350 mL] 350 mL  PEEP/CPAP (cm H2O):  [5 cm H20] 5 cm H20  MAP (cm H2O):  [7-8.5] 7     PHYSICAL EXAM:  NEURO:  - No sedation  - ECNS, not grimacing to nox stim, Not following commands  - Anisocoria present: R pupil 1  mm, L pupil oval 3 mm. Minimally reactive  - BUE flaccid, BLE triple flexion L >R, tongue deviated to left   - No corneal reflex, cough, or gag present  CV:  - RRR, NSR on telemetry  RESP:  - No signs of resp distress and intubated  - Oxygen: 30% fio2, PEEP 5  :  - Urinary catheter in place with yellow urine   GI:  - Abdomen NT/ND, soft  SKIN:  - Intact    MEDICATIONS:  Scheduled: PRN: Continuous:   ascorbic acid, 500 mg, oral, Daily  donepezil, 10 mg, oral, Nightly  heparin (porcine), 5,000 Units, subcutaneous, q8h  memantine, 10 mg, oral, BID  NIFEdipine ER, 30 mg, oral, BID  oxybutynin, 5 mg, oral, TID  pantoprazole, 40 mg, intravenous, Daily  perflutren protein A microsphere, 0.5 mL, intravenous, Once in imaging  polyethylene glycol, 17 g, oral, Daily  sennosides-docusate sodium, 2 tablet, oral, BID  sulfur hexafluoride microsphr, 2 mL, intravenous, Once in imaging     PRN medications: acetaminophen **OR** acetaminophen, calcium gluconate, calcium gluconate, magnesium sulfate, magnesium sulfate, niCARdipine, oxyCODONE, oxyCODONE, oxygen, oxygen, potassium chloride CR **OR** potassium chloride, potassium chloride CR **OR** potassium chloride lactated Ringer's, 75 mL/hr, Last Rate: 75 mL/hr (01/30/25 0809)  niCARdipine, 1-15 mg/hr         LAB RESULTS:    Results from last 72 hours   Lab Units 01/30/25  0010 01/29/25  0003   GLUCOSE mg/dL 91 90   SODIUM mmol/L 146* 147*   POTASSIUM mmol/L 3.6 3.7   CHLORIDE mmol/L 113* 114*   CO2 mmol/L 22 23   ANION GAP mmol/L 15 14   BUN mg/dL 25* 28*   CREATININE mg/dL 1.02 0.98   EGFR mL/min/1.73m*2 55* 57*   CALCIUM mg/dL 9.1 9.7   PHOSPHORUS mg/dL 3.1 3.1   ALBUMIN g/dL 2.7* 3.0*   MAGNESIUM mg/dL 2.22 2.18   POCT CALCIUM IONIZED (MMOL/L) IN BLOOD mmol/L 1.15 1.30      Results from last 72 hours   Lab Units 01/30/25  0010 01/29/25  0003   WBC AUTO x10*3/uL 7.6 7.8   NRBC AUTO /100 WBCs 0.0 0.0   RBC AUTO x10*6/uL 3.40* 3.75*   HEMOGLOBIN g/dL 10.7* 11.6*   HEMATOCRIT %  32.0* 35.2*   MCV fL 94 94   MCH pg 31.5 30.9   MCHC g/dL 33.4 33.0   RDW % 13.9 14.3   PLATELETS AUTO x10*3/uL 183 197               IMAGING RESULTS:  XR abdomen 1 view   Final Result   1. Nonobstructive bowel-gas pattern with a large colonic stool burden.   2. Enteric tube tip projects over the distal gastric antrum/pyloric   region.   3. Questionable small left pleural effusion.        I personally reviewed the image(s)/study and resident interpretation   as stated by Dr. Jessika Dangelo MD. I agree with the findings as   stated. This study was interpreted at [place], [place].        MACRO:   None        Signed by: Anthony Jara 1/28/2025 8:20 AM   Dictation workstation:   [id number]      Transthoracic Echo (TTE) Complete   Final Result      CT head wo IV contrast   Final Result   1. No significant change in the appearance of an acute hyperdense   intraparenchymal hematoma involving the midbrain and isreal with   extension along the left middle cerebellar peduncle and left thalamus   with mildly increased degree of localized edema. Similar local mass   effect on the 3rd ventricle with slight hemorrhagic extension to the   3rd and 4th ventricles.   2. 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   Presence of hydrocephalus was reported on the prior examination of   01/27/2025 9:36 a.m.. Please correlate clinically.             I personally reviewed the image(s)/study and resident interpretation   as stated by Dr. Jessika Dangelo MD. I agree with the findings as   stated. This study was interpreted at [place], [place].        MACRO:   Critical Finding:  See findings. Notification was initiated on    1/27/2025 at 5:38 pm by  Eloy Lau.  (**-YCF-**)        Signed by: Eloy Lau 1/27/2025 5:38 PM   Dictation workstation:   [id number]      CT head wo IV contrast   Final Result   Interval worsening of the predominantly brainstem intraparenchymal   hemorrhage with new hemorrhage extension into the left thalamus,   decompression into the 3rd ventricle, and early development of new   hydrocephalus.        I personally reviewed the images/study and I agree with the findings   as stated. This study was interpreted at [place], [place].        MACRO:   Huber Pruitt discussed the significance and urgency of this   critical finding via [employer] secure chat with Dr. Jeong on   1/27/2025 at 10:01 am.  (**-RCF-**) Findings:  See findings.             Signed by: Jessica Ramirez 1/27/2025 11:25 AM   Dictation workstation:   [id number]      XR chest 1 view   Final Result   1.  Consolidative left retrocardiac/basilar opacity and linear   opacities of the right lung base. Findings are favored to represent   atelectasis however underlying infectious process not definitively   excluded.   2. Low lung volumes with endotracheal tube located 1.4 cm above the   yamilex. Recommend retraction.        I personally reviewed the images/study and I agree with the findings   as stated by resident Fabiano Wang. This study was interpreted   at [place], [place].        MACRO:   Critical Finding:  See findings. Notification was initiated on   1/27/2025 at 6:38 am by  Fabiano Wang.  (**-YCF-**) Instructions:   Repositioning is recommended.        Signed by: Kaye Orellana 1/27/2025 8:53 AM   Dictation workstation:   [id number]             Assessment/Plan    84 y.o. female with PMH MH of HTN, HLD, hyperparaT, breast cancer s/p R mastectomy, depression/anxiety, frontotemporal demntia (MOCA at that time was 20/30) .  Admitted 1/27/2025 with  Intracerebral hemorrhage, nontraumatic (Multi) after presenting with hemorrhage involving the isreal, midbrain, & L thalamus (vol < 30 ml).     NEURO:  #L thalamic & brainstem ICH   Assessment:  - Neurologically: see exam above  Plan:  - NSU  - Neuro Checks: Q1H  - Poor exam, poor prognosis   - Ongoing GOC discussion    CARDIOVASCULAR:  #HTN  #HLD  Assessment:  - s/p atropine en route [facility] for thea 30s. Currently thea 50s, BP stable  - Home med: Nifedipine 30 mg BID  - ECHO 1/27/25: EF 69%, no PFO or thrombus noted  Plan:  - Continue to monitor on telemetry  - BP goal: 130-150    --> PRN: Labetalol and Hydralazine     RESPIRATORY:  #Respiratory insufficiency 2/2 ICH requiring intubation for airway protection  Assessment:  - 1/26: Intubated at OSH prior to transfer [facility] for airway protection  Plan:  - Continuous pulse oximetry   - O2 PRN to maintain SpO2 > 94%, wean as tolerated  - Ventilator bundle while intubated and Daily CPAP and weaning parameters while intubated    RENAL/:  #JAZMIN - improved, likely prerenal  Assessment:  - Baseline BUN/Cr: 19/ .97-1  Results from last 72 hours   Lab Units 01/30/25  0010 01/29/25  0003   BUN mg/dL 25* 28*   CREATININE mg/dL 1.02 0.98       Plan:  - Monitor with daily RFP  - Maintain mckenna catheter for: critically ill patient who need accurate urinary output measurements  - Avoid nephrotoxic agents    FEN/GI:  #No active issues  Assessment:  - Last BM Date:  (pta)Last BM: PTA  Plan:  - Monitor and replace electrolytes per protocol  - LR @75ml/hr  - If family does not transition to comfort will start TF, would eventually need PEG  - Diet: NPO   - Bowel Regimen: Docusate-Senna BID and Miralax QD    ENDOCRINE:  #hyperparaT  Assessment:  Results from last 7 days   Lab Units 01/30/25  0547 01/30/25  0010 01/30/25  0009 01/29/25  1218 01/29/25  0742 01/29/25  0610 01/29/25  0003 01/28/25 2025 01/28/25  0818 01/28/25  0212 01/27/25  0408 01/27/25  0406 01/26/25 2025   POCT  GLUCOSE mg/dL 84  --  85 87 87 96  --  81   < >  --    < >  --   --    GLUCOSE mg/dL  --  91  --   --   --   --  90  --   --  96  --  144* 124*   SODIUM mmol/L  --  146*  --   --   --   --  147*  --   --  145  --  144 137    < > = values in this interval not displayed.     Plan:  - Accuchecks & ISS PRN     HEMATOLOGY:  #Anemia  Assessment:  - Baseline Hgb: 15  - Baseline Plts: 295  Results from last 7 days   Lab Units 25  0010 25  0003 25  0406   HEMOGLOBIN g/dL 10.7* 11.6* 12.8   HEMATOCRIT % 32.0* 35.2* 39.0   PLATELETS AUTO x10*3/uL 183 197 213     Plan:  - Continue to monitor with daily CBC and Coag panel    INFECTIOUS DISEASE:  #No active issues  Assessment:  Results from last 7 days   Lab Units 25  0003 25  0406   WBC AUTO x10*3/uL 7.6 7.8 8.5    - Temp (24hrs), Av.1 °C (98.7 °F), Min:36.6 °C (97.9 °F), Max:37.7 °C (99.9 °F)     Plan:  - Continue to monitor for s/sx of infection  - Pan culture for temperature > 38.4 C    MUSCULOSKELETAL:  - No acute issues    SKIN:  - No acute issues  - Turns and skin care per NSU protocol    ACCESS:  - PIV    PROPHYLAXIS:  - DVT Ppx: SCDs and SQH  - GI Ppx: Pantoprazole    RESTRAINTS:  Not indicated/Patient does not meet criteria for restraints    Oj Valverde, APRN-CNP  Neuroscience Intensive Care       Total critical care time of 60 minutes, with > 50% of time spent in direct contact with patient/family for education, counseling and coordination of care.

## 2025-01-30 NOTE — CARE PLAN
Problem: Chronic Conditions and Co-morbidities  Goal: Patient's chronic conditions and co-morbidity symptoms are monitored and maintained or improved  Outcome: Not Progressing     Problem: Nutrition  Goal: Nutrient intake appropriate for maintaining nutritional needs  Outcome: Not Progressing     Problem: General Stroke  Goal: Demonstrate improvement in neurological exam throughout the shift  Outcome: Not Progressing  Goal: Participate in treatment (ie., meds, therapy) throughout shift  Outcome: Not Progressing  Goal: Tolerate enteral feeding throughout shift  Outcome: Not Progressing     Problem: Fall/Injury  Goal: Verbalize understanding of personal risk factors for fall in the hospital  Outcome: Not Progressing  Goal: Verbalize understanding of risk factor reduction measures to prevent injury from fall in the home  Outcome: Not Progressing  Goal: Use assistive devices by end of the shift  Outcome: Not Progressing     Problem: Skin  Goal: Participates in plan/prevention/treatment measures  Outcome: Not Progressing     Problem: Pain - Adult  Goal: Verbalizes/displays adequate comfort level or baseline comfort level  Outcome: Progressing     Problem: Discharge Planning  Goal: Discharge to home or other facility with appropriate resources  Outcome: Progressing     Problem: General Stroke  Goal: Establish a mutual long term goal with patient by discharge  Outcome: Progressing  Goal: Maintain BP within ordered limits throughout shift  Outcome: Progressing  Goal: No symptoms of aspiration throughout shift  Outcome: Progressing  Goal: No symptoms of hemorrhage throughout shift  Outcome: Progressing  Goal: Decreased nausea/vomiting throughout shift  Outcome: Progressing  Goal: Controlled blood glucose throughout shift  Outcome: Progressing  Goal: Out of bed three times today  Outcome: Progressing     Problem: ICU Stroke  Goal: Tolerate ventilator weaning trial during shift  Outcome: Progressing  Goal: Maintain patent  airway throughout shift  Outcome: Progressing  Goal: Achieve/maintain targeted sodium level throughout shift  Outcome: Progressing     Problem: Fall/Injury  Goal: Not fall by end of shift  Outcome: Progressing  Goal: Be free from injury by end of the shift  Outcome: Progressing  Goal: Pace activities to prevent fatigue by end of the shift  Outcome: Progressing     Problem: Skin  Goal: Prevent/manage excess moisture  Outcome: Progressing  Goal: Prevent/minimize sheer/friction injuries  Outcome: Progressing  Goal: Promote/optimize nutrition  Outcome: Progressing  Goal: Promote skin healing  Outcome: Progressing     Problem: Safety - Adult  Goal: Free from fall injury  Outcome: Met   The patient's goals for the shift include      Problem: Chronic Conditions and Co-morbidities  Goal: Patient's chronic conditions and co-morbidity symptoms are monitored and maintained or improved  Outcome: Not Progressing     Problem: Nutrition  Goal: Nutrient intake appropriate for maintaining nutritional needs  Outcome: Not Progressing     Problem: General Stroke  Goal: Demonstrate improvement in neurological exam throughout the shift  Outcome: Not Progressing  Goal: Participate in treatment (ie., meds, therapy) throughout shift  Outcome: Not Progressing  Goal: Tolerate enteral feeding throughout shift  Outcome: Not Progressing     Problem: Fall/Injury  Goal: Verbalize understanding of personal risk factors for fall in the hospital  Outcome: Not Progressing  Goal: Verbalize understanding of risk factor reduction measures to prevent injury from fall in the home  Outcome: Not Progressing  Goal: Use assistive devices by end of the shift  Outcome: Not Progressing     Problem: Skin  Goal: Participates in plan/prevention/treatment measures  Outcome: Not Progressing      The clinical goals for the shift include remain hemodynamically stable

## 2025-01-30 NOTE — CARE PLAN
Problem: Pain - Adult  Goal: Verbalizes/displays adequate comfort level or baseline comfort level  Outcome: Progressing     Problem: Chronic Conditions and Co-morbidities  Goal: Patient's chronic conditions and co-morbidity symptoms are monitored and maintained or improved  Outcome: Progressing     Problem: General Stroke  Goal: Maintain BP within ordered limits throughout shift  Outcome: Progressing  Goal: No symptoms of aspiration throughout shift  Outcome: Progressing     Problem: ICU Stroke  Goal: Maintain patent airway throughout shift  Outcome: Progressing     Problem: Fall/Injury  Goal: Not fall by end of shift  Outcome: Progressing     Problem: Skin  Goal: Prevent/manage excess moisture  Outcome: Progressing

## 2025-01-30 NOTE — PROGRESS NOTES
Social Work Discharge Planning note:    -Patient discussed during interdisciplinary rounds.   -Team members present: Fellow, PT and OT, and SW  -Plan per medical team: Pt is not medically ready for discharge. Ongoing GOC discussions with family.   -Payer: [employer]  -Status: Inpatient  -Discharge disposition: TBD - Medical team will continue GOC discussion with family.   -Anticipated Date of Discharge:  2/1/25    This SW   ADIA Martinez, LSW    Office: [phone]  Secure chat via [employer]

## 2025-01-30 NOTE — SIGNIFICANT EVENT
Update note:    An extended discussion was had with patient's  regarding patient's neurologic injury, the spectrum out outcomes associated with it including best-case and worst-case scenarios for recovery as well as most likely neurologic recovery (being somewhere in between two previously described scenarios.)    All questions were answered regarding patient's current care plan. Then, a discussion was had regarding the logistics for comfort care transition with emphasis on the importance of seeing this transition as one away from longevity of life and toward quality of life. Explained code status options, At this time, family elected to make patient DNR Comfort care with palliative extubation once family arrives, likely 1/31 afternoon .     People in room: myself, patient's  Nito Harden MD  Neurocritical Care Fellow  PGY-6

## 2025-01-31 NOTE — DISCHARGE SUMMARY
Discharge Diagnosis  Intracerebral hemorrhage, nontraumatic (Multi)    Hospital Course  Ms Marli Canales is a 84 year old with a past medical history of HTN, HLD, hyperparathyroidism, breast cancer s/p right mastectomy, depression/anxiety, 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 On evaluate by Neurology patient was found to have NIHSS 3 (LLE weakness and dysarthria), CT Head showed no intracrnial abnormality and CTA Head/Neck demonstrated no LVO. TNK was given at 20:32 and patient was admitted to ICU for post-TNK management. Patient had gum bleeding that was treated with topical TXA. On neurological evaluation in the ICU patient was noted to have anisocoria L>R, stat CT head demonstrated large acute hemorrhage involving left thalamus extending into isreal, midbrain, left middle cerebral peduncle and left middle cerebellar peduncle. Patient subsequently transferred to NSGY at [facility] for higher level of care.     Patient remained intubated during hospitalization for airway protection. Despite holding sedation, patient remained unresponsive to verbal or noxious stimuli. Pupils non-reactive to light. No corneal reflexes. Gag reflex remains intact at this time. NSGY signed off on 1/27 due to no surgical interventions indicated.  Discussed imaging findings and poor prognosis of neurologic recovery with patients family. She was compassionately extubated on 1/31. Pronounced dead at 1309.    Pertinent Physical Exam At Time of Discharge  Patient pronounced dead at 1309.     There was no response to verbal, tactile or noxious stimuli. Pupils were mid-dilated and fixed. No breath sounds were appreciated over either lung field. No carotid pulses were palpable. No heart sounds were auscultated over entire precordium. Rhythm strip was obtained showing asystole.     Joe Murphy MD  PGY2 Neurology

## 2025-01-31 NOTE — PROGRESS NOTES
[facility]  NEUROSCIENCE INTENSIVE CARE UNIT  DAILY PROGRESS NOTE       Patient Name: Marli Canales   MRN: [id number]     Admit Date: 2025     : 1940 AGE: 84 y.o. GENDER: female        Subjective  Marli Canales is 84 y.o. 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  presented to [facility] ED with acute L weakness, slurred speech, and L FD.  LKW 0. CTH negative, CTA no LVO, incidental L palatine tonsil mass s/p TNK at [facility]  @. several hours later pt showed anisocoria with L pupil greater than R. Stat CT head was ordered, showing a large acute hemorrhage involving the isreal midbrain extending into the L thalamus (vol < 30 ml). Fibrinogen & INR were WNL. Cryo not given. Pt intubated for airway protect prior to transferring to [facility] for further management of ICH. En route to [facility], pt had an episode of bradycardia to 30s requiring Atropine.     In regards to her Dementia, sees Dr. Sergio Millan (last 2024), MOCA at that time . At baseline she is independent with ADLs but husbands assists with mobility.    Interval Events: NAEON. Extensive discussion was had with family yesterday (see significant event note). Transitioned to DNR Comfort Care. Plan for palliative extubation this afternoon.     Objective   VITALS (24H):  Temp:  [36.5 °C (97.7 °F)-37.3 °C (99.1 °F)] 37.3 °C (99.1 °F)  Heart Rate:  [] 106  Resp:  [12-17] 17  BP: (119-152)/(68-91) 119/78  FiO2 (%):  [30 %] 30 %  INTAKE/OUTPUT:  Intake/Output Summary (Last 24 hours) at 2025 0740  Last data filed at 2025 2100  Gross per 24 hour   Intake 1370 ml   Output 260 ml   Net 1110 ml     VENT SETTINGS:  Vent Mode: Volume control/assist control  FiO2 (%):  [30 %] 30 %  S RR:  [14] 14  S VT:  [350 mL] 350 mL  PEEP/CPAP (cm H2O):  [5 cm H20] 5 cm  H20  MAP (cm H2O):  [7-8] 7     PHYSICAL EXAM:  NEURO:  - No sedation  - ECNS, not grimacing to nox stim, Not following commands  - Anisocoria present: R pupil 1 mm, L pupil oval 3 mm. Minimally reactive  - BUE flaccid, BLE triple flexion L >R, tongue deviated to left   - No corneal reflex, cough, or gag present  CV:  - RRR, NSR on telemetry  RESP:  - No signs of resp distress and intubated  - Oxygen: 30% FiO2, PEEP 5  :  - Urinary catheter in place with yellow urine   GI:  - Abdomen NT/ND, soft  SKIN:  - Intact    MEDICATIONS:  Scheduled: PRN: Continuous:       PRN medications: acetaminophen **OR** acetaminophen, albuterol, glycopyrrolate, haloperidol lactate, haloperidol lactate, HYDROmorphone, HYDROmorphone, HYDROmorphone, LORazepam, LORazepam, oxyCODONE, oxyCODONE         LAB RESULTS:    Results from last 72 hours   Lab Units 01/30/25  0010 01/29/25  0003   GLUCOSE mg/dL 91 90   SODIUM mmol/L 146* 147*   POTASSIUM mmol/L 3.6 3.7   CHLORIDE mmol/L 113* 114*   CO2 mmol/L 22 23   ANION GAP mmol/L 15 14   BUN mg/dL 25* 28*   CREATININE mg/dL 1.02 0.98   EGFR mL/min/1.73m*2 55* 57*   CALCIUM mg/dL 9.1 9.7   PHOSPHORUS mg/dL 3.1 3.1   ALBUMIN g/dL 2.7* 3.0*   MAGNESIUM mg/dL 2.22 2.18   POCT CALCIUM IONIZED (MMOL/L) IN BLOOD mmol/L 1.15 1.30      Results from last 72 hours   Lab Units 01/30/25  0010 01/29/25  0003   WBC AUTO x10*3/uL 7.6 7.8   NRBC AUTO /100 WBCs 0.0 0.0   RBC AUTO x10*6/uL 3.40* 3.75*   HEMOGLOBIN g/dL 10.7* 11.6*   HEMATOCRIT % 32.0* 35.2*   MCV fL 94 94   MCH pg 31.5 30.9   MCHC g/dL 33.4 33.0   RDW % 13.9 14.3   PLATELETS AUTO x10*3/uL 183 197        IMAGING RESULTS:  XR abdomen 1 view   Final Result   1. Nonobstructive bowel-gas pattern with a large colonic stool burden.   2. Enteric tube tip projects over the distal gastric antrum/pyloric   region.   3. Questionable small left pleural effusion.        I personally reviewed the image(s)/study and resident interpretation   as stated by   Jessika Dangelo MD. I agree with the findings as   stated. This study was interpreted at [place], [place].        MACRO:   None        Signed by: Anthony Jara 1/28/2025 8:20 AM   Dictation workstation:   [id number]      Transthoracic Echo (TTE) Complete   Final Result      CT head wo IV contrast   Final Result   1. No significant change in the appearance of an acute hyperdense   intraparenchymal hematoma involving the midbrain and isreal with   extension along the left middle cerebellar peduncle and left thalamus   with mildly increased degree of localized edema. Similar local mass   effect on the 3rd ventricle with slight hemorrhagic extension to the   3rd and 4th ventricles.   2. 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   Presence of hydrocephalus was reported on the prior examination of   01/27/2025 9:36 a.m.. Please correlate clinically.             I personally reviewed the image(s)/study and resident interpretation   as stated by Dr. Jessika Dangelo MD. I agree with the findings as   stated. This study was interpreted at [facility], [place], [place].        MACRO:   Critical Finding:  See findings. Notification was initiated on   1/27/2025 at 5:38 pm by  Eloy Lau.  (**-YCF-**)        Signed by: Eloy Lau 1/27/2025 5:38 PM   Dictation workstation:   [id number]      CT head wo IV contrast   Final Result   Interval worsening of the predominantly brainstem intraparenchymal   hemorrhage with new hemorrhage extension into the left thalamus,   decompression into the 3rd ventricle, and early development of new   hydrocephalus.        I personally reviewed the images/study and I agree with the findings   as  stated. This study was interpreted at [place], [place].        MACRO:   Huber Pruitt discussed the significance and urgency of this   critical finding via [employer] secure chat with Dr. Jeong on   1/27/2025 at 10:01 am.  (**-RCF-**) Findings:  See findings.             Signed by: Jessica Ramirez 1/27/2025 11:25 AM   Dictation workstation:   [id number]      XR chest 1 view   Final Result   1.  Consolidative left retrocardiac/basilar opacity and linear   opacities of the right lung base. Findings are favored to represent   atelectasis however underlying infectious process not definitively   excluded.   2. Low lung volumes with endotracheal tube located 1.4 cm above the   yamilex. Recommend retraction.        I personally reviewed the images/study and I agree with the findings   as stated by resident Fabiano Wang. This study was interpreted   at [place], [place].        MACRO:   Critical Finding:  See findings. Notification was initiated on   1/27/2025 at 6:38 am by  Fabiano Wang.  (**-YCF-**) Instructions:   Repositioning is recommended.        Signed by: Kaye Orellana 1/27/2025 8:53 AM   Dictation workstation:   [id number]           Assessment/Plan    84 y.o. female with PMH MH of HTN, HLD, hyperparaT, breast cancer s/p R mastectomy, depression/anxiety, frontotemporal demntia (MOCA at that time was 20/30) .  Admitted 1/27/2025 with Intracerebral hemorrhage, nontraumatic (Multi) after presenting with hemorrhage involving the isreal, midbrain, & L thalamus (vol < 30 ml). Poor prognosis. Transitioned to comfort care.    NEURO:  #L thalamic & brainstem ICH   Assessment:  - Neurologically: see exam above  Plan:  - NSU  - Neuro Checks: Q1H  - Poor exam, poor prognosis   - Ongoing GOC discussion - transitioned to comfort care with plan for palliative extubation 1/31    CARDIOVASCULAR:  #HTN  #HLD  Assessment:  - s/p  atropine en route to [facility] for thea 30s. Currently thea 50s, BP stable  - Home med: Nifedipine 30 mg BID  - ECHO 1/27/25: EF 69%, no PFO or thrombus noted  Plan:  - Continue to monitor on telemetry  - BP goal: 130-150    RESPIRATORY:  #Respiratory insufficiency 2/2 ICH requiring intubation for airway protection  Assessment:  - 1/26: Intubated at OSH prior to transfer to [facility] for airway protection  Plan:  - Continuous pulse oximetry   - O2 PRN to maintain SpO2 > 94%, wean as tolerated  - Ventilator bundle while intubated and Daily CPAP and weaning parameters while intubated  - Plan for palliative extubation 1/31    RENAL/:  #JAZMIN - improved, likely prerenal  Assessment:  - Baseline BUN/Cr: 19/ .97-1  Results from last 72 hours   Lab Units 01/30/25  0010 01/29/25  0003   BUN mg/dL 25* 28*   CREATININE mg/dL 1.02 0.98       Plan:  - Monitor with daily RFP  - Maintain mckenna catheter for: critically ill patient who need accurate urinary output measurements  - Avoid nephrotoxic agents    FEN/GI:  #No active issues  Assessment:  - Last BM Date:  (pta)  Plan:  - If family does not transition to comfort will start TF, would eventually need PEG  - Diet: NPO   - Bowel Regimen: Docusate-Senna BID and Miralax QD    ENDOCRINE:  #hyperparathyroid  Assessment:  Results from last 7 days   Lab Units 01/30/25  0547 01/30/25  0010 01/30/25  0009 01/29/25  1218 01/29/25  0742 01/29/25  0610 01/29/25  0003 01/28/25 2025 01/28/25  0818 01/28/25  0212 01/27/25  0408 01/27/25  0406 01/26/25 2025   POCT GLUCOSE mg/dL 84  --  85 87 87 96  --  81   < >  --    < >  --   --    GLUCOSE mg/dL  --  91  --   --   --   --  90  --   --  96  --  144* 124*   SODIUM mmol/L  --  146*  --   --   --   --  147*  --   --  145  --  144 137    < > = values in this interval not displayed.     Plan:  - Accuchecks & ISS PRN     HEMATOLOGY:  #Anemia  Assessment:  - Baseline Hgb: 15  - Baseline Plts: 295  Results from last 7 days   Lab Units 01/30/25  0010  25  0003 25  0406   HEMOGLOBIN g/dL 10.7* 11.6* 12.8   HEMATOCRIT % 32.0* 35.2* 39.0   PLATELETS AUTO x10*3/uL 183 197 213     Plan:  - Continue to monitor with daily CBC and Coag panel    INFECTIOUS DISEASE:  #No active issues  Assessment:  Results from last 7 days   Lab Units 25  0010 25  0003 25  0406   WBC AUTO x10*3/uL 7.6 7.8 8.5    - Temp (24hrs), Av.8 °C (98.2 °F), Min:36.5 °C (97.7 °F), Max:37.3 °C (99.1 °F)     Plan:  - Continue to monitor for s/sx of infection  - Pan culture for temperature > 38.4 C    MUSCULOSKELETAL:  - No acute issues    SKIN:  - No acute issues  - Turns and skin care per NSU protocol    ACCESS:  - PIV    PROPHYLAXIS:  - DVT Ppx: SCDs and SQH  - GI Ppx: Pantoprazole    RESTRAINTS:  Not indicated/Patient does not meet criteria for restraints    Joe Murphy MD  PGY2 Neurology  Neuroscience Intensive Care    Patient discussed with attending physician Dr. Ferrell.    The patient is critically ill with coma and acute respiratory failure in the setting of brainstem hemorrhage   Remains in poor neurological condition  Comfort care measures have been institute  Terminal extubation at the discretion of the patient's family    I have seen and examined the patient.  I have reviewed the patient's laboratory, radiographic, and clinical data.  I have spent 30 minutes providing critical care for the patient.      DESTIN Ferrell M.D.

## 2025-01-31 NOTE — SIGNIFICANT EVENT
I was called by RN to evaluate patient for unresponsiveness. On exam, no spontaneous movements were present. There was no response to verbal, tactile or noxious stimuli. Pupils were mid-dilated and fixed. No breath sounds were appreciated over either lung field. No carotid pulses were palpable. No heart sounds were auscultated over entire precordium. Rhythm strip was obtained showing asystole. Patient pronounced dead on 1/31/2025 at 1309. Attending physician Dr. Ferrell was notified. Next of kin/family at bedside was notified. Brittney and postmortem services offered. Patient was DNR comfort measures prior to pronunciation of death.    Joe Murphy MD  PGY2 Neurology

## 2025-01-31 NOTE — CARE PLAN
The patient's goals for the shift include        Problem: Pain - Adult  Goal: Verbalizes/displays adequate comfort level or baseline comfort level  Outcome: Progressing     Problem: Discharge Planning  Goal: Discharge to home or other facility with appropriate resources  Outcome: Progressing     Problem: Chronic Conditions and Co-morbidities  Goal: Patient's chronic conditions and co-morbidity symptoms are monitored and maintained or improved  Outcome: Progressing     Problem: General Stroke  Goal: Establish a mutual long term goal with patient by discharge  Outcome: Progressing  Goal: Demonstrate improvement in neurological exam throughout the shift  Outcome: Progressing  Goal: Maintain BP within ordered limits throughout shift  Outcome: Progressing  Goal: Participate in treatment (ie., meds, therapy) throughout shift  Outcome: Progressing  Goal: No symptoms of aspiration throughout shift  Outcome: Progressing  Goal: No symptoms of hemorrhage throughout shift  Outcome: Progressing  Goal: Tolerate enteral feeding throughout shift  Outcome: Progressing  Goal: Decreased nausea/vomiting throughout shift  Outcome: Progressing  Goal: Controlled blood glucose throughout shift  Outcome: Progressing  Goal: Out of bed three times today  Outcome: Progressing     Problem: ICU Stroke  Goal: Maintain ICP within ordered limits throughout shift  Outcome: Progressing  Goal: Tolerate EVD clamping trial throughout shift  Outcome: Progressing  Goal: Tolerate ventilator weaning trial during shift  Outcome: Progressing  Goal: Maintain patent airway throughout shift  Outcome: Progressing  Goal: Achieve/maintain targeted sodium level throughout shift  Outcome: Progressing     Problem: Skin  Goal: Decreased wound size/increased tissue granulation at next dressing change  Outcome: Progressing  Flowsheets (Taken 1/30/2025 1944)  Decreased wound size/increased tissue granulation at next dressing change:   Promote sleep for wound healing    Protective dressings over bony prominences   Utilize specialty bed per algorithm  Goal: Participates in plan/prevention/treatment measures  Outcome: Progressing  Flowsheets (Taken 1/30/2025 1944)  Participates in plan/prevention/treatment measures: Elevate heels  Goal: Prevent/manage excess moisture  Outcome: Progressing  Flowsheets (Taken 1/30/2025 1944)  Prevent/manage excess moisture:   Cleanse incontinence/protect with barrier cream   Moisturize dry skin   Monitor for/manage infection if present  Goal: Prevent/minimize sheer/friction injuries  Outcome: Progressing  Flowsheets (Taken 1/30/2025 1944)  Prevent/minimize sheer/friction injuries:   HOB 30 degrees or less   Turn/reposition every 2 hours/use positioning/transfer devices   Complete micro-shifts as needed if patient unable. Adjust patient position to relieve pressure points, not a full turn   Utilize specialty bed per algorithm   Use pull sheet  Goal: Promote/optimize nutrition  Outcome: Progressing  Flowsheets (Taken 1/30/2025 1944)  Promote/optimize nutrition: Monitor/record intake including meals  Goal: Promote skin healing  Outcome: Progressing  Flowsheets (Taken 1/30/2025 1944)  Promote skin healing:   Turn/reposition every 2 hours/use positioning/transfer devices   Protective dressings over bony prominences

## 2025-03-28 ENCOUNTER — APPOINTMENT (OUTPATIENT)
Dept: NEUROLOGY | Facility: CLINIC | Age: 85
End: 2025-03-28
Payer: COMMERCIAL